# Patient Record
Sex: MALE | Race: WHITE | NOT HISPANIC OR LATINO | Employment: FULL TIME | URBAN - METROPOLITAN AREA
[De-identification: names, ages, dates, MRNs, and addresses within clinical notes are randomized per-mention and may not be internally consistent; named-entity substitution may affect disease eponyms.]

---

## 2017-01-03 ENCOUNTER — PATIENT OUTREACH (OUTPATIENT)
Dept: ADMINISTRATIVE | Facility: CLINIC | Age: 52
End: 2017-01-03
Payer: COMMERCIAL

## 2017-01-03 NOTE — PATIENT INSTRUCTIONS
Discharge Instructions for Diverticulitis  You have been diagnosed with diverticulitis. This is a condition in which small pouches form in your colon (large intestine) and become inflamed or infected. Follow the guidelines below for home care.  As you recover  Tips for recovery include:  · Eat a low-fiber diet. Your healthcare provider may advise a liquid diet. This gives your bowel a chance to rest so that it can recover.  · Foods to include: flake cereal, mashed potatoes, pancakes, waffles, pasta, white bread, rice, applesauce, bananas, eggs, fish, poultry, tofu, and well-cooked vegetables  · Take your medicines as directed. Do not stop taking the medicines, even if you feel better.  · Monitor your temperature and report any rising temperature to your healthcare provider.  · Take antibiotics exactly as directed. Do not miss any and keep taking them even if you feel better.   · Drink 6 to 8 glasses of water every day, unless directed otherwise.  · Use a heating pad or hot water bottle to reduce abdominal cramping or pain.  Preventing diverticulitis in the future  Tips for prevention include:  · Eat a high-fiber diet. Fiber adds bulk to the stool so that it passes through the large intestine more easily.  · Keep drinking 6 to 8 glasses of water every day, unless directed otherwise.  · Begin an exercise program. Ask your healthcare provider how to get started. You can benefit from simple activities such as walking or gardening.  · Treat diarrhea with a bland diet. Start with liquids only, then slowly add fiber over time.  · Watch for changes in your bowel movements (constipation to diarrhea).  · Avoid constipation with fiber and add a stool softener if needed.   · Get plenty of rest and sleep.  Follow-up care  Make a follow-up appointment as directed by our staff.  When to call your healthcare provider  Call your healthcare provider immediately if you have any of the following:  · Fever of 100.4°F (38.0°C) or  higher, or as directed by your healthcare provider  · Chills  · Severe cramps in the belly, most commonly the lower left side  · Tenderness in the belly, most commonly the lower left side  · Nausea and vomiting  · Bleeding from your rectum   © 2888-1193 [employer]. [address] [place] [address]. All rights reserved. This information is not intended as a substitute for professional medical care. Always follow your healthcare professional's instructions.

## 2017-03-20 ENCOUNTER — HOSPITAL ENCOUNTER (EMERGENCY)
Facility: HOSPITAL | Age: 52
Discharge: HOME | End: 2017-03-20
Attending: EMERGENCY MEDICINE
Payer: COMMERCIAL

## 2017-03-20 VITALS
SYSTOLIC BLOOD PRESSURE: 154 MMHG | DIASTOLIC BLOOD PRESSURE: 89 MMHG | TEMPERATURE: 99 F | WEIGHT: 218 LBS | OXYGEN SATURATION: 97 % | RESPIRATION RATE: 18 BRPM | BODY MASS INDEX: 27.98 KG/M2 | HEIGHT: 74 IN | HEART RATE: 100 BPM

## 2017-03-20 DIAGNOSIS — R10.9 ABDOMINAL PAIN, UNSPECIFIED LOCATION: ICD-10-CM

## 2017-03-20 DIAGNOSIS — K57.32 DIVERTICULITIS OF LARGE INTESTINE WITHOUT PERFORATION OR ABSCESS WITHOUT BLEEDING: Primary | ICD-10-CM

## 2017-03-20 LAB
ALBUMIN SERPL BCP-MCNC: 4.4 G/DL
ALP SERPL-CCNC: 57 U/L
ALT SERPL W/O P-5'-P-CCNC: 38 U/L
AMYLASE SERPL-CCNC: 53 U/L
ANION GAP SERPL CALC-SCNC: 11 MMOL/L
AST SERPL-CCNC: 16 U/L
BASOPHILS # BLD AUTO: 0.03 K/UL
BASOPHILS NFR BLD: 0.2 %
BILIRUB SERPL-MCNC: 1.8 MG/DL
BILIRUB UR QL STRIP: NEGATIVE
BUN SERPL-MCNC: 17 MG/DL
CALCIUM SERPL-MCNC: 10.1 MG/DL
CHLORIDE SERPL-SCNC: 101 MMOL/L
CLARITY UR: CLEAR
CO2 SERPL-SCNC: 26 MMOL/L
COLOR UR: YELLOW
CREAT SERPL-MCNC: 1.1 MG/DL
DIFFERENTIAL METHOD: ABNORMAL
EOSINOPHIL # BLD AUTO: 0.1 K/UL
EOSINOPHIL NFR BLD: 0.5 %
ERYTHROCYTE [DISTWIDTH] IN BLOOD BY AUTOMATED COUNT: 13 %
EST. GFR  (AFRICAN AMERICAN): >60 ML/MIN/1.73 M^2
EST. GFR  (NON AFRICAN AMERICAN): >60 ML/MIN/1.73 M^2
GLUCOSE SERPL-MCNC: 101 MG/DL
GLUCOSE UR QL STRIP: NEGATIVE
HCT VFR BLD AUTO: 45.3 %
HGB BLD-MCNC: 16.4 G/DL
HGB UR QL STRIP: NEGATIVE
KETONES UR QL STRIP: ABNORMAL
LEUKOCYTE ESTERASE UR QL STRIP: NEGATIVE
LIPASE SERPL-CCNC: 17 U/L
LYMPHOCYTES # BLD AUTO: 1.7 K/UL
LYMPHOCYTES NFR BLD: 9.5 %
MCH RBC QN AUTO: 31.4 PG
MCHC RBC AUTO-ENTMCNC: 36.2 %
MCV RBC AUTO: 87 FL
MONOCYTES # BLD AUTO: 1.8 K/UL
MONOCYTES NFR BLD: 10.1 %
NEUTROPHILS # BLD AUTO: 13.8 K/UL
NEUTROPHILS NFR BLD: 79.7 %
NITRITE UR QL STRIP: NEGATIVE
PH UR STRIP: 6 [PH] (ref 5–8)
PLATELET # BLD AUTO: 237 K/UL
PMV BLD AUTO: 11.1 FL
POTASSIUM SERPL-SCNC: 4.2 MMOL/L
PROT SERPL-MCNC: 8.7 G/DL
PROT UR QL STRIP: ABNORMAL
RBC # BLD AUTO: 5.22 M/UL
SODIUM SERPL-SCNC: 138 MMOL/L
SP GR UR STRIP: 1.02 (ref 1–1.03)
URN SPEC COLLECT METH UR: ABNORMAL
UROBILINOGEN UR STRIP-ACNC: NEGATIVE EU/DL
WBC # BLD AUTO: 17.33 K/UL

## 2017-03-20 PROCEDURE — 81003 URINALYSIS AUTO W/O SCOPE: CPT

## 2017-03-20 PROCEDURE — 36000 PLACE NEEDLE IN VEIN: CPT

## 2017-03-20 PROCEDURE — 85025 COMPLETE CBC W/AUTO DIFF WBC: CPT

## 2017-03-20 PROCEDURE — 80053 COMPREHEN METABOLIC PANEL: CPT

## 2017-03-20 PROCEDURE — 99284 EMERGENCY DEPT VISIT MOD MDM: CPT | Mod: 25

## 2017-03-20 PROCEDURE — 82150 ASSAY OF AMYLASE: CPT

## 2017-03-20 PROCEDURE — 83690 ASSAY OF LIPASE: CPT

## 2017-03-20 RX ORDER — PROMETHAZINE HYDROCHLORIDE 25 MG/1
25 TABLET ORAL EVERY 6 HOURS PRN
Qty: 15 TABLET | Refills: 0 | Status: SHIPPED | OUTPATIENT
Start: 2017-03-20 | End: 2017-12-07

## 2017-03-20 RX ORDER — CIPROFLOXACIN 500 MG/1
500 TABLET ORAL 2 TIMES DAILY
Qty: 20 TABLET | Refills: 0 | Status: SHIPPED | OUTPATIENT
Start: 2017-03-20 | End: 2017-03-28

## 2017-03-20 RX ORDER — METRONIDAZOLE 500 MG/1
500 TABLET ORAL 3 TIMES DAILY
Qty: 21 TABLET | Refills: 0 | Status: SHIPPED | OUTPATIENT
Start: 2017-03-20 | End: 2017-03-27

## 2017-03-20 RX ORDER — TRAMADOL HYDROCHLORIDE 50 MG/1
50 TABLET ORAL
COMMUNITY
Start: 2017-02-20 | End: 2017-04-12 | Stop reason: SDUPTHER

## 2017-03-20 RX ORDER — HYDROCODONE BITARTRATE AND ACETAMINOPHEN 5; 325 MG/1; MG/1
1 TABLET ORAL EVERY 4 HOURS PRN
Qty: 11 TABLET | Refills: 0 | Status: SHIPPED | OUTPATIENT
Start: 2017-03-20 | End: 2017-03-28

## 2017-03-20 RX ORDER — TIZANIDINE 4 MG/1
TABLET ORAL
COMMUNITY
Start: 2016-10-06 | End: 2017-04-12 | Stop reason: SDUPTHER

## 2017-03-20 NOTE — ED AVS SNAPSHOT
[facility] -   [id number] [facility] [id number]               Dimas Darden   3/20/2017  5:02 PM   ED    Description:  Male : 1965   Department:  [facility]           Your Care was Coordinated By:     Provider Role From To    Tim Sullivan MD Attending Provider [phone] --      Reason for Visit     Abdominal Pain           Diagnoses this Visit        Comments    Diverticulitis of large intestine without perforation or abscess without bleeding    -  Primary     Abdominal pain, unspecified location           ED Disposition     ED Disposition Condition Comment    Discharge             To Do List           Follow-up Information     Follow up with PCP In 2 days.    Contact information:    [phone]       These Medications        Disp Refills Start End    hydrocodone-acetaminophen 5-325mg (NORCO) 5-325 mg per tablet 11 tablet 0 3/20/2017 3/30/2017    Take 1 tablet by mouth every 4 (four) hours as needed for Pain. - Oral    Pharmacy: [address] Ph #: [phone]       promethazine (PHENERGAN) 25 MG tablet 15 tablet 0 3/20/2017     Take 1 tablet (25 mg total) by mouth every 6 (six) hours as needed for Nausea. - Oral    Pharmacy: [address] Ph #: [phone]       ciprofloxacin HCl (CIPRO) 500 MG tablet 20 tablet 0 3/20/2017 3/30/2017    Take 1 tablet (500 mg total) by mouth 2 (two) times daily. - Oral    Pharmacy: [address] Ph #: [phone]       metronidazole (FLAGYL) 500 MG tablet 21 tablet 0 3/20/2017 3/27/2017    Take 1 tablet (500 mg total) by mouth 3 (three) times daily. - Oral    Pharmacy: [address] Ph #: [phone]         [facility] On Call     [facility] -  Assistance  Registered nurses in  the [facility] provide clinical advisement, health education, appointment booking, and other advisory services.  Call for this free service at [phone].             Medications           Message regarding Medications     Verify the changes and/or additions to your medication regime listed below are the same as discussed with your clinician today.  If any of these changes or additions are incorrect, please notify your healthcare provider.        START taking these NEW medications        Refills    hydrocodone-acetaminophen 5-325mg (NORCO) 5-325 mg per tablet 0    Sig: Take 1 tablet by mouth every 4 (four) hours as needed for Pain.    Class: Print    Route: Oral    promethazine (PHENERGAN) 25 MG tablet 0    Sig: Take 1 tablet (25 mg total) by mouth every 6 (six) hours as needed for Nausea.    Class: Print    Route: Oral    ciprofloxacin HCl (CIPRO) 500 MG tablet 0    Sig: Take 1 tablet (500 mg total) by mouth 2 (two) times daily.    Class: Print    Route: Oral    metronidazole (FLAGYL) 500 MG tablet 0    Sig: Take 1 tablet (500 mg total) by mouth 3 (three) times daily.    Class: Print    Route: Oral           Verify that the below list of medications is an accurate representation of the medications you are currently taking.  If none reported, the list may be blank. If incorrect, please contact your healthcare provider. Carry this list with you in case of emergency.           Current Medications     tizanidine (ZANAFLEX) 4 MG tablet TAKE ONE TABLET BY MOUTH TWICE DAILY AS NEEDED FOR MUSCLE SPASM    tramadol (ULTRAM) 50 mg tablet Take 50 mg by mouth.    ciprofloxacin HCl (CIPRO) 500 MG tablet Take 1 tablet (500 mg total) by mouth 2 (two) times daily.    hydrocodone-acetaminophen 5-325mg (NORCO) 5-325 mg per tablet Take 1 tablet by mouth every 4 (four) hours as needed for Pain.    metronidazole (FLAGYL) 500 MG tablet Take 1 tablet (500 mg total) by mouth 3 (three) times daily.    promethazine  "(PHENERGAN) 25 MG tablet Take 1 tablet (25 mg total) by mouth every 6 (six) hours as needed for Nausea.           Clinical Reference Information           Your Vitals Were     BP Pulse Temp Resp Height Weight    176/88 (BP Location: Right arm, Patient Position: Sitting) 116 98.4 °F (36.9 °C) (Oral) 18 6' 2" (1.88 m) 98.9 kg (218 lb)    SpO2 BMI             95% 27.99 kg/m2         Allergies as of 3/20/2017     No Known Allergies      Immunizations Administered on Date of Encounter - 3/20/2017     None      ED Micro, Lab, POCT     Start Ordered       Status Ordering Provider    03/20/17 1711 03/20/17 1710  CBC auto differential  STAT      Final result     03/20/17 1711 03/20/17 1710  Comprehensive metabolic panel  STAT      In process     03/20/17 1711 03/20/17 1710  Urinalysis  STAT      Final result     03/20/17 1711 03/20/17 1710  Lipase  STAT      In process     03/20/17 1711 03/20/17 1710  Amylase  STAT      In process       ED Imaging Orders     Start Ordered       Status Ordering Provider    03/20/17 1711 03/20/17 1710  CT Renal Stone Study ABD Pelvis WO  1 time imaging      Final result         Discharge Instructions         Abdominal Pain    Abdominal pain is pain in the stomach or belly area. Everyone has this pain from time to time. In many cases it goes away on its own. But abdominal pain can sometimes be due to a serious problem, such as appendicitis. So its important to know when to seek help.  Causes of abdominal pain  There are many possible causes of abdominal pain. Common causes in adults include:  · Constipation, diarrhea, or gas  · Stomach acid flowing back up into the esophagus (acid reflux or heartburn)  · Severe acid reflux, called GERD (gastroesophageal reflux disease)  · A sore in the lining of the stomach or small intestine (peptic ulcer)  · Inflammation of the gallbladder, liver, or pancreas  · Gallstones or kidney stones  · Appendicitis   · Intestinal blockage   · An internal organ pushing " through a muscle or other tissue (hernia)  · Urinary tract infections  · In women, menstrual cramps, fibroids, or endometriosis  · Inflammation or infection of the intestines  Diagnosing the cause of abdominal pain  Your healthcare provider will do a physical exam help find the cause of your pain. If needed, tests will be ordered. Belly pain has many possible causes. So it can be hard to find the reason for your pain. Giving details about your pain can help. Tell your provider where and when you feel the pain, and what makes it better or worse. Also let your provider know if you have other symptoms such as:  · Fever  · Tiredness  · Upset stomach (nausea)  · Vomiting  · Changes in bathroom habits  Treating abdominal pain  Some causes of pain need emergency medical treatment right away. These include appendicitis or a bowel blockage. Other problems can be treated with rest, fluids, or medicines. Your healthcare provider can give you specific instructions for treatment or self-care based on what is causing your pain.  If you have vomiting or diarrhea, sip water or other clear fluids. When you are ready to eat solid foods again, start with small amounts of easy-to-digest, low-fat foods. These include apple sauce, toast, or crackers.   When to seek medical care  Call 911 or go to the hospital right away if you:  · Cant pass stool and are vomiting  · Are vomiting blood or have bloody diarrhea or black, tarry diarrhea  · Have chest, neck, or shoulder pain  · Feel like you might pass out  · Have pain in your shoulder blades with nausea  · Have sudden, severe belly pain  · Have new, severe pain unlike any you have felt before  · Have a belly that is rigid, hard, and tender to touch  Call your healthcare provider if you have:  · Pain for more than 5 days  · Bloating for more than 2 days  · Diarrhea for more than 5 days  · A fever of 100.4°F (38.0°C) or higher, or as directed by your provider  · Pain that gets worse  · Weight  loss for no reason  · Continued lack of appetite  · Blood in your stool  How to prevent abdominal pain  Here are some tips to help prevent abdominal pain:  · Eat smaller amounts of food at one time.  · Avoid greasy, fried, or other high-fat foods.  · Avoid foods that give you gas.  · Exercise regularly.  · Drink plenty of fluids.  To help prevent GERD symptoms:  · Quit smoking.  · Reduce alcohol and certain foods that increase stomach acid.  · Avoid aspirin and over-the-counter pain and fever medicines (NSAIDS or nonsteroidal anti-inflammatory drugs), if possible  · Lose extra weight.  · Finish eating at least 2 hours before you go to bed or lie down.  · Raise the head of your bed.  Date Last Reviewed: 7/1/2016  © 6993-3036 [employer]. [address] [place] [address], [place] [address]. All rights reserved. This information is not intended as a substitute for professional medical care. Always follow your healthcare professional's instructions.          [facility] Sign-Up     Activating your [facility] account is as easy as 1-2-3!     1) Visit [email or web address], select Sign Up Now, enter this activation code and your date of birth, then select Next.  [email or web address]  Expires: 5/4/2017  6:01 PM      2) Create a username and password to use when you visit [facility] in the future and select a security question in case you lose your password and select Next.    3) Enter your e-mail address and click Sign Up!    Additional Information  If you have questions, please e-mail [email or web address] or call [phone] to talk to our [facility] staff. Remember, [facility] is NOT to be used for urgent needs. For medical emergencies, dial 911.          [facility] complies with applicable Federal civil rights laws and does not discriminate on the basis of race, color, national origin, age, disability, or sex.        Language Assistance Services     ATTENTION: Language assistance services are available, free of  charge. Please call [phone].      ATENCIÓN: Si habla español, tiene a burciaga disposición servicios gratuitos de asistencia lingüística. Llame al [phone].     CHÚ Ý: N?u b?n nói Ti?ng Vi?t, có các d?ch v? h? tr? ngôn ng? mi?n phí dành cho b?n. G?i s? [phone].

## 2017-03-20 NOTE — ED PROVIDER NOTES
SCRIBE #1 NOTE: I, Christa Lucas, am scribing for, and in the presence of, Tim Sullivan MD. I have scribed the entire note.      History      Chief Complaint   Patient presents with    Abdominal Pain     pt states he was told he had appendicitis       Review of patient's allergies indicates:  No Known Allergies     HPI   HPI    3/20/2017, 5:07 PM   History obtained from the patient      History of Present Illness: Dimas Darden is a 51 y.o. male patient who presents to the Emergency Department for abdominal pain which onset gradually 3 days ago. Pain is located to the RUQ and is described as stabbing. Pt was seen by PCP PTA and was told to come to ED for further evaluation. Sx have been constant and moderate in severity. Pt states that pain began gradually worsening last night after eating. No mitigating factors noted. No associated sx included. Pt denies any fever, chills, CP, SOB, n/v/d, constipation, dysuria, or hematuria. No further complaints at this time.       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 Not pertinent     Social History:  Social History     Social History Main Topics    Smoking status: Never Smoker    Smokeless tobacco: Unknown     Alcohol use Yes    Drug use: Unknown     Sexual activity: Unknown        ROS   Review of Systems   Constitutional: Negative for chills, diaphoresis and fever.   HENT: Negative for sore throat.    Respiratory: Negative for shortness of breath.    Cardiovascular: Negative for chest pain.   Gastrointestinal: Positive for abdominal pain. Negative for blood in stool, constipation, diarrhea, nausea and vomiting.   Genitourinary: Negative for dysuria.   Musculoskeletal:  "Negative for back pain, neck pain and neck stiffness.   Skin: Negative for rash.   Neurological: Negative for dizziness, weakness, light-headedness, numbness and headaches.   Hematological: Does not bruise/bleed easily.       Physical Exam    Initial Vitals   BP Pulse Resp Temp SpO2   03/20/17 1629 03/20/17 1629 03/20/17 1629 03/20/17 1629 03/20/17 1629   176/88 116 18 98.4 °F (36.9 °C) 95 %      Physical Exam  Nursing Notes and Vital Signs Reviewed.  Constitutional: Patient is in no acute distress. Awake and alert. Well-developed and well-nourished.  Head: Atraumatic. Normocephalic.  Eyes: PERRL. EOM intact. Conjunctivae are not pale. No scleral icterus.  ENT: Mucous membranes are moist. Oropharynx is clear and symmetric.    Neck: Supple. Full ROM. No lymphadenopathy.  Cardiovascular: Regular rate. Regular rhythm. No murmurs, rubs, or gallops. Distal pulses are 2+ and symmetric.  Pulmonary/Chest: No respiratory distress. Clear to auscultation bilaterally. No wheezing, rales, or rhonchi.  Abdominal: Soft and non-distended.  There is no tenderness.  No rebound, guarding, or rigidity.   Musculoskeletal: Moves all extremities. No obvious deformities. No edema. No calf tenderness.  Skin: Warm and dry.  Neurological:  Alert, awake, and appropriate.  Normal speech.  No acute focal neurological deficits are appreciated.  Psychiatric: Normal affect. Good eye contact. Appropriate in content.    ED Course    Procedures  ED Vital Signs:  Vitals:    03/20/17 1629   BP: (!) 176/88   Pulse: (!) 116   Resp: 18   Temp: 98.4 °F (36.9 °C)   TempSrc: Oral   SpO2: 95%   Weight: 98.9 kg (218 lb)   Height: 6' 2" (1.88 m)       Abnormal Lab Results:  Labs Reviewed   CBC W/ AUTO DIFFERENTIAL - Abnormal; Notable for the following:        Result Value    WBC 17.33 (*)     MCH 31.4 (*)     MCHC 36.2 (*)     Gran # 13.8 (*)     Mono # 1.8 (*)     Gran% 79.7 (*)     Lymph% 9.5 (*)     All other components within normal limits   COMPREHENSIVE " METABOLIC PANEL - Abnormal; Notable for the following:     Total Protein 8.7 (*)     Total Bilirubin 1.8 (*)     All other components within normal limits   URINALYSIS - Abnormal; Notable for the following:     Protein, UA Trace (*)     Ketones, UA 2+ (*)     All other components within normal limits   LIPASE   AMYLASE        All Lab Results:  Results for orders placed or performed during the hospital encounter of 03/20/17   CBC auto differential   Result Value Ref Range    WBC 17.33 (H) 3.90 - 12.70 K/uL    RBC 5.22 4.60 - 6.20 M/uL    Hemoglobin 16.4 14.0 - 18.0 g/dL    Hematocrit 45.3 40.0 - 54.0 %    MCV 87 82 - 98 fL    MCH 31.4 (H) 27.0 - 31.0 pg    MCHC 36.2 (H) 32.0 - 36.0 %    RDW 13.0 11.5 - 14.5 %    Platelets 237 150 - 350 K/uL    MPV 11.1 9.2 - 12.9 fL    Gran # 13.8 (H) 1.8 - 7.7 K/uL    Lymph # 1.7 1.0 - 4.8 K/uL    Mono # 1.8 (H) 0.3 - 1.0 K/uL    Eos # 0.1 0.0 - 0.5 K/uL    Baso # 0.03 0.00 - 0.20 K/uL    Gran% 79.7 (H) 38.0 - 73.0 %    Lymph% 9.5 (L) 18.0 - 48.0 %    Mono% 10.1 4.0 - 15.0 %    Eosinophil% 0.5 0.0 - 8.0 %    Basophil% 0.2 0.0 - 1.9 %    Differential Method Automated    Comprehensive metabolic panel   Result Value Ref Range    Sodium 138 136 - 145 mmol/L    Potassium 4.2 3.5 - 5.1 mmol/L    Chloride 101 95 - 110 mmol/L    CO2 26 23 - 29 mmol/L    Glucose 101 70 - 110 mg/dL    BUN, Bld 17 6 - 20 mg/dL    Creatinine 1.1 0.5 - 1.4 mg/dL    Calcium 10.1 8.7 - 10.5 mg/dL    Total Protein 8.7 (H) 6.0 - 8.4 g/dL    Albumin 4.4 3.5 - 5.2 g/dL    Total Bilirubin 1.8 (H) 0.1 - 1.0 mg/dL    Alkaline Phosphatase 57 55 - 135 U/L    AST 16 10 - 40 U/L    ALT 38 10 - 44 U/L    Anion Gap 11 8 - 16 mmol/L    eGFR if African American >60 >60 mL/min/1.73 m^2    eGFR if non African American >60 >60 mL/min/1.73 m^2   Urinalysis   Result Value Ref Range    Specimen UA Urine, Clean Catch     Color, UA Yellow Yellow, Straw, Barbara    Appearance, UA Clear Clear    pH, UA 6.0 5.0 - 8.0    Specific Gravity, UA  1.020 1.005 - 1.030    Protein, UA Trace (A) Negative    Glucose, UA Negative Negative    Ketones, UA 2+ (A) Negative    Bilirubin (UA) Negative Negative    Occult Blood UA Negative Negative    Nitrite, UA Negative Negative    Urobilinogen, UA Negative <2.0 EU/dL    Leukocytes, UA Negative Negative   Lipase   Result Value Ref Range    Lipase 17 4 - 60 U/L   Amylase   Result Value Ref Range    Amylase 53 20 - 110 U/L         Imaging Results:  Imaging Results         CT Renal Stone Study ABD Pelvis WO (Final result) Result time:  03/20/17 17:55:46    Final result by Pacheco Carrero MD (03/20/17 17:55:46)    Impression:           1.  Findings consistent with uncomplicated diverticulitis of the ascending colon in this patient with diffuse diverticulosis.  Significant wall thickening of the adjacent colon.  Neoplastic process is not completely excluded.  According to ensure resolution recommended.  Negative for free air.  Negative for focal drainable fluid collection.  2.  Bibasilar atelectasis.  3.  Negative for acute process otherwise.  Normal appendix negative for renal stone disease or hydronephrosis.    4.  Numerous hepatic cysts.  Multilevel degenerative disc changes of the lumbar.  Prostate calcifications.  Other incidental findings as noted above.    All CT scans at this facility used dose modulation, iterative reconstruction, and/or weight based dosing when appropriate to reduce radiation dose to as low as reasonably achievable.      Electronically signed by: PACHECO CARRERO MD  Date:     03/20/17  Time:    17:55     Narrative:    CT abdomen and pelvis without contrast, <multiplanar reconstructions>    CLINICAL INDICATION: Right upper quadrant abdominal pain.    flank pain.    TECHNIQUE  Axial images through the abdomen and pelvis were obtained without IV or oral contrast. <Sagittal and coronal reconstructions are provided for review.>    FINDINGS: No comparison studies are available.    LUNG BASES: Scarring or  atelectasis in the bilateral lower lobes and inferior middle lobe and lingula. The lung bases are otherwise clear.  Negative for pleural or pericardial abnormalities.  The distal esophagus is normal. Trace amount of pericardial fluid.    ABDOMEN: Multiple subcentimeter hepatic low attenuation foci noted, likely cysts. Noncontrasted appearances of the liver, pancreas, and spleen are otherwise normal. The gallbladder and biliary tree are normal.      The kidneys are without solid or cystic masses, hydronephrosis or renal stone disease.    Minor calcifications are present without aneurysmal change.  Retroaortic left renal vein incidentally noted.  Negative for ascites.      Moderate inflammatory changes surround the ascending colon just underneath the right hepatic lobe associated with numerous right-sided colonic diverticula, without evidence for free air or focal drainable fluid collection.  Normal appendix.  The rest of the colon is normal, considering there is diffuse diverticulosis.  Small bowel is normal.  The bowel is      PELVIS:  The urinary bladder is normal.  Prostate calcifications.  The male pelvic organs are otherwise normal.  There are pelvic phleboliths.    Negative for osseous abnormalities.  Negative for significant spinal canal stenosis. Multilevel marginal spondylosis.  Mild disc height reduction at L3/L4.  Moderate disc height reduction at L5/S1 with vacuum phenomenon.  Multilevel degenerative facet arthropathy.    Negative for groin adenopathy.         Abdominal wall is intact.                      The Emergency Provider reviewed the vital signs and test results, which are outlined above.    ED Discussion     5:58 PM: Reassessed pt at this time.  Discussed with pt all pertinent ED information and results. Discussed pt dx  and plan of tx. Gave pt all f/u and return to the ED instructions. All questions and concerns were addressed at this time. Pt expresses understanding of information and  instructions, and is comfortable with plan to discharge. Pt is stable for discharge.    New Prescriptions    CIPROFLOXACIN HCL (CIPRO) 500 MG TABLET    Take 1 tablet (500 mg total) by mouth 2 (two) times daily.    HYDROCODONE-ACETAMINOPHEN 5-325MG (NORCO) 5-325 MG PER TABLET    Take 1 tablet by mouth every 4 (four) hours as needed for Pain.    METRONIDAZOLE (FLAGYL) 500 MG TABLET    Take 1 tablet (500 mg total) by mouth 3 (three) times daily.    PROMETHAZINE (PHENERGAN) 25 MG TABLET    Take 1 tablet (25 mg total) by mouth every 6 (six) hours as needed for Nausea.       Follow-up Information     Follow up with PCP In 2 days.    Contact information:    [phone]            Medical Decision Making    Medical Decision Making:   Clinical Tests:   Lab Tests: Ordered and Reviewed  Radiological Study: Ordered and Reviewed           Scribe Attestation:   Scribe #1: I performed the above scribed service and the documentation accurately describes the services I performed. I attest to the accuracy of the note.    Attending:   Physician Attestation Statement for Scribe #1: I, Tim Sullivan MD, personally performed the services described in this documentation, as scribed by Christa Lucas, in my presence, and it is both accurate and complete.          Clinical Impression       ICD-10-CM ICD-9-CM   1. Diverticulitis of large intestine without perforation or abscess without bleeding K57.32 562.11   2. Abdominal pain, unspecified location R10.9 789.00       Disposition:   Disposition: Discharged  Condition: Stable         Tim Sullivan MD  03/20/17 7945

## 2017-03-20 NOTE — DISCHARGE INSTRUCTIONS
Abdominal Pain    Abdominal pain is pain in the stomach or belly area. Everyone has this pain from time to time. In many cases it goes away on its own. But abdominal pain can sometimes be due to a serious problem, such as appendicitis. So its important to know when to seek help.  Causes of abdominal pain  There are many possible causes of abdominal pain. Common causes in adults include:  · Constipation, diarrhea, or gas  · Stomach acid flowing back up into the esophagus (acid reflux or heartburn)  · Severe acid reflux, called GERD (gastroesophageal reflux disease)  · A sore in the lining of the stomach or small intestine (peptic ulcer)  · Inflammation of the gallbladder, liver, or pancreas  · Gallstones or kidney stones  · Appendicitis   · Intestinal blockage   · An internal organ pushing through a muscle or other tissue (hernia)  · Urinary tract infections  · In women, menstrual cramps, fibroids, or endometriosis  · Inflammation or infection of the intestines  Diagnosing the cause of abdominal pain  Your healthcare provider will do a physical exam help find the cause of your pain. If needed, tests will be ordered. Belly pain has many possible causes. So it can be hard to find the reason for your pain. Giving details about your pain can help. Tell your provider where and when you feel the pain, and what makes it better or worse. Also let your provider know if you have other symptoms such as:  · Fever  · Tiredness  · Upset stomach (nausea)  · Vomiting  · Changes in bathroom habits  Treating abdominal pain  Some causes of pain need emergency medical treatment right away. These include appendicitis or a bowel blockage. Other problems can be treated with rest, fluids, or medicines. Your healthcare provider can give you specific instructions for treatment or self-care based on what is causing your pain.  If you have vomiting or diarrhea, sip water or other clear fluids. When you are ready to eat solid foods again,  start with small amounts of easy-to-digest, low-fat foods. These include apple sauce, toast, or crackers.   When to seek medical care  Call 911 or go to the hospital right away if you:  · Cant pass stool and are vomiting  · Are vomiting blood or have bloody diarrhea or black, tarry diarrhea  · Have chest, neck, or shoulder pain  · Feel like you might pass out  · Have pain in your shoulder blades with nausea  · Have sudden, severe belly pain  · Have new, severe pain unlike any you have felt before  · Have a belly that is rigid, hard, and tender to touch  Call your healthcare provider if you have:  · Pain for more than 5 days  · Bloating for more than 2 days  · Diarrhea for more than 5 days  · A fever of 100.4°F (38.0°C) or higher, or as directed by your provider  · Pain that gets worse  · Weight loss for no reason  · Continued lack of appetite  · Blood in your stool  How to prevent abdominal pain  Here are some tips to help prevent abdominal pain:  · Eat smaller amounts of food at one time.  · Avoid greasy, fried, or other high-fat foods.  · Avoid foods that give you gas.  · Exercise regularly.  · Drink plenty of fluids.  To help prevent GERD symptoms:  · Quit smoking.  · Reduce alcohol and certain foods that increase stomach acid.  · Avoid aspirin and over-the-counter pain and fever medicines (NSAIDS or nonsteroidal anti-inflammatory drugs), if possible  · Lose extra weight.  · Finish eating at least 2 hours before you go to bed or lie down.  · Raise the head of your bed.  Date Last Reviewed: 7/1/2016  © 4502-4095 [employer]. [address] [place] [address], [place] [address]. All rights reserved. This information is not intended as a substitute for professional medical care. Always follow your healthcare professional's instructions.

## 2017-03-28 ENCOUNTER — OFFICE VISIT (OUTPATIENT)
Dept: INTERNAL MEDICINE | Facility: CLINIC | Age: 52
End: 2017-03-28
Payer: COMMERCIAL

## 2017-03-28 VITALS
TEMPERATURE: 96 F | HEIGHT: 74 IN | DIASTOLIC BLOOD PRESSURE: 80 MMHG | HEART RATE: 76 BPM | WEIGHT: 215.81 LBS | BODY MASS INDEX: 27.7 KG/M2 | SYSTOLIC BLOOD PRESSURE: 122 MMHG | OXYGEN SATURATION: 96 %

## 2017-03-28 DIAGNOSIS — Z12.11 COLON CANCER SCREENING: Primary | ICD-10-CM

## 2017-03-28 DIAGNOSIS — Z76.89 ENCOUNTER TO ESTABLISH CARE WITH NEW DOCTOR: ICD-10-CM

## 2017-03-28 DIAGNOSIS — Z13.9 SCREENING: ICD-10-CM

## 2017-03-28 PROCEDURE — 1160F RVW MEDS BY RX/DR IN RCRD: CPT | Mod: S$GLB,,, | Performed by: FAMILY MEDICINE

## 2017-03-28 PROCEDURE — 99999 PR PBB SHADOW E&M-EST. PATIENT-LVL III: CPT | Mod: PBBFAC,,, | Performed by: FAMILY MEDICINE

## 2017-03-28 PROCEDURE — 99204 OFFICE O/P NEW MOD 45 MIN: CPT | Mod: S$GLB,,, | Performed by: FAMILY MEDICINE

## 2017-03-28 NOTE — PROGRESS NOTES
Subjective:       Patient ID: Dimas Darden is a 51 y.o. male.    Chief Complaint: Follow-up    HPI Hospital follow up with diverticulitis and to establish care. He has a past medical history as listed below.  Hospital ED HPI below in blue.    History of Present Illness: Dimas Darden is a 51 y.o. male patient who presents to the Emergency Department for abdominal pain which onset gradually 3 days ago. Pain is located to the RUQ and is described as stabbing. Pt was seen by PCP PTA and was told to come to ED for further evaluation. Sx have been constant and moderate in severity. Pt states that pain began gradually worsening last night after eating. No mitigating factors noted. No associated sx included. Pt denies any fever, chills, CP, SOB, n/v/d, constipation, dysuria, or hematuria. No further complaints at this time.     History of back pain if he does too much. Muscle relaxer helps more than pain pills.     Review of Systems   Constitutional: Negative for activity change, appetite change, fatigue and fever.   HENT: Negative for congestion, ear pain, facial swelling, hearing loss, sore throat and tinnitus.    Eyes: Negative for redness and visual disturbance.   Respiratory: Negative for cough, chest tightness and wheezing.    Gastrointestinal: Negative for abdominal distention, abdominal pain, constipation, diarrhea, nausea and vomiting.   Endocrine: Negative for polydipsia and polyuria.   Genitourinary: Negative for discharge, flank pain and frequency.   Musculoskeletal: Negative for back pain, gait problem and joint swelling.   Skin: Negative for rash.   Neurological: Negative for dizziness, tremors, seizures, weakness and headaches.   Psychiatric/Behavioral: Negative for agitation and confusion.           Past Medical History:   Diagnosis Date    Disc     Bulging disc in back    Diverticulitis      Past Surgical History:   Procedure Laterality Date    UNDESCENDED TESTICLE EXPLORATION Left     Age 15      History reviewed. No pertinent family history.  Social History     Social History    Marital status:      Spouse name: N/A    Number of children: N/A    Years of education: N/A     Social History Main Topics    Smoking status: Never Smoker    Smokeless tobacco: None    Alcohol use Yes      Comment: socially    Drug use: No    Sexual activity: Yes     Partners: Female     Other Topics Concern    None     Social History Narrative    None       Objective:        Physical Exam   Constitutional: He is oriented to person, place, and time. He appears well-nourished. He does not appear ill.   HENT:   Head: Normocephalic and atraumatic.   Right Ear: External ear normal.   Left Ear: External ear normal.   Eyes: EOM are normal. Pupils are equal, round, and reactive to light. Right eye exhibits no discharge. Left eye exhibits no discharge. No scleral icterus.   Neck: Normal range of motion. Neck supple. No thyromegaly present.   Cardiovascular: Normal rate, regular rhythm and normal heart sounds.    No murmur heard.  Pulmonary/Chest: Effort normal and breath sounds normal. No respiratory distress. He has no wheezes.   Abdominal: Soft. Bowel sounds are normal. He exhibits no distension. There is no tenderness.   Musculoskeletal: Normal range of motion. He exhibits no edema.   Neurological: He is alert and oriented to person, place, and time. He has normal reflexes. Coordination normal.   Skin: Skin is warm. No rash noted. No erythema.   Psychiatric: He has a normal mood and affect. His behavior is normal.   Nursing note and vitals reviewed.        Assessment/Plan:   Colon cancer screening  -     Discussed colonoscopy today he does not want to do it this year. Will obtain fecal occult.   -     Occult blood x 1, stool; Future; Expected date: 3/28/17  -     Occult blood x 1, stool; Future; Expected date: 3/28/17  -     Occult blood x 1, stool; Future; Expected date: 3/28/17    Encounter to establish care with  new doctor  -     Lipid panel; Future; Expected date: 3/28/17  Discussed past medical, surgical, social and family history. Reviewed health maintenance.     Screening  -     Lipid panel; Future; Expected date: 3/28/17      Return in about 1 year (around 3/28/2018).    Angela Payne MD  [facility]   Family Medicine

## 2017-03-28 NOTE — MR AVS SNAPSHOT
[facility]  [address] [place] [address]  Phone: [phone]  Fax: [phone]                  Dimas Darden   3/28/2017 3:00 PM   Office Visit    Description:  Male : 1965   Provider:  Angela Payne MD   Department:  [facility]           Reason for Visit     Follow-up           Diagnoses this Visit        Comments    Colon cancer screening    -  Primary     Encounter to establish care with new doctor         Screening                To Do List           Goals (5 Years of Data)     None      [facility]     [facility] Nurse Care Line -  Assistance  Registered nurses in the [facility] On Call Center provide clinical advisement, health education, appointment booking, and other advisory services.  Call for this free service at [phone].             Medications           Message regarding Medications     Verify the changes and/or additions to your medication regime listed below are the same as discussed with your clinician today.  If any of these changes or additions are incorrect, please notify your healthcare provider.        STOP taking these medications     ciprofloxacin HCl (CIPRO) 500 MG tablet Take 1 tablet (500 mg total) by mouth 2 (two) times daily.    hydrocodone-acetaminophen 5-325mg (NORCO) 5-325 mg per tablet Take 1 tablet by mouth every 4 (four) hours as needed for Pain.           Verify that the below list of medications is an accurate representation of the medications you are currently taking.  If none reported, the list may be blank. If incorrect, please contact your healthcare provider. Carry this list with you in case of emergency.           Current Medications     promethazine (PHENERGAN) 25 MG tablet Take 1 tablet (25 mg total) by mouth every 6 (six) hours as needed for Nausea.    tizanidine (ZANAFLEX) 4 MG tablet TAKE ONE TABLET BY MOUTH TWICE DAILY AS NEEDED FOR MUSCLE SPASM    tramadol (ULTRAM) 50 mg  "tablet Take 50 mg by mouth.           Clinical Reference Information           Your Vitals Were     BP Pulse Temp Height Weight SpO2    122/80 76 96 °F (35.6 °C) (Tympanic) 6' 2" (1.88 m) 97.9 kg (215 lb 13.3 oz) 96%    BMI                27.71 kg/m2          Blood Pressure          Most Recent Value    BP  122/80      Allergies as of 3/28/2017     No Known Allergies      Immunizations Administered on Date of Encounter - 3/28/2017     None      Orders Placed During Today's Visit     Future Labs/Procedures Expected by Expires    Lipid panel  3/28/2017 6/26/2017    Occult Blood Stool, CA Screen  3/28/2017 3/28/2018      [facility] Sign-Up     Activating your [facility] account is as easy as 1-2-3!     1) Visit [email or web address], select Sign Up Now, enter this activation code and your date of birth, then select Next.  [email or web address]  Expires: 5/4/2017  6:01 PM      2) Create a username and password to use when you visit [facility] in the future and select a security question in case you lose your password and select Next.    3) Enter your e-mail address and click Sign Up!    Additional Information  If you have questions, please e-mail [email or web address] or call [phone] to talk to our [facility] staff. Remember, [facility] is NOT to be used for urgent needs. For medical emergencies, dial 911.         Language Assistance Services     ATTENTION: Language assistance services are available, free of charge. Please call [phone].      ATENCIÓN: Si habla español, tiene a burciaga disposición servicios gratuitos de asistencia lingüística. Llame al [phone].     CHÚ Ý: N?u b?n nói Ti?ng Vi?t, có các d?ch v? h? tr? ngôn ng? mi?n phí dành cho b?n. G?i s? [phone].         [facility] complies with applicable Federal civil rights laws and does not discriminate on the basis of race, color, national origin, age, disability, or sex.        "

## 2017-03-31 ENCOUNTER — LAB VISIT (OUTPATIENT)
Dept: LAB | Facility: HOSPITAL | Age: 52
End: 2017-03-31
Attending: FAMILY MEDICINE
Payer: COMMERCIAL

## 2017-03-31 DIAGNOSIS — Z76.89 ENCOUNTER TO ESTABLISH CARE WITH NEW DOCTOR: ICD-10-CM

## 2017-03-31 DIAGNOSIS — Z13.9 SCREENING: ICD-10-CM

## 2017-03-31 LAB
CHOLEST/HDLC SERPL: 3.4 {RATIO}
HDL/CHOLESTEROL RATIO: 29.4 %
HDLC SERPL-MCNC: 170 MG/DL
HDLC SERPL-MCNC: 50 MG/DL
LDLC SERPL CALC-MCNC: 91.4 MG/DL
NONHDLC SERPL-MCNC: 120 MG/DL
TRIGL SERPL-MCNC: 143 MG/DL

## 2017-03-31 PROCEDURE — 36415 COLL VENOUS BLD VENIPUNCTURE: CPT

## 2017-03-31 PROCEDURE — 80061 LIPID PANEL: CPT

## 2017-04-06 ENCOUNTER — HOSPITAL ENCOUNTER (OUTPATIENT)
Dept: RADIOLOGY | Facility: HOSPITAL | Age: 52
Discharge: HOME | End: 2017-04-06
Attending: FAMILY MEDICINE
Payer: COMMERCIAL

## 2017-04-06 ENCOUNTER — TELEPHONE (OUTPATIENT)
Dept: INTERNAL MEDICINE | Facility: CLINIC | Age: 52
End: 2017-04-06

## 2017-04-06 ENCOUNTER — OFFICE VISIT (OUTPATIENT)
Dept: INTERNAL MEDICINE | Facility: CLINIC | Age: 52
End: 2017-04-06
Payer: COMMERCIAL

## 2017-04-06 VITALS
HEART RATE: 88 BPM | TEMPERATURE: 98 F | SYSTOLIC BLOOD PRESSURE: 118 MMHG | WEIGHT: 221.31 LBS | DIASTOLIC BLOOD PRESSURE: 90 MMHG | OXYGEN SATURATION: 97 % | HEIGHT: 74 IN | BODY MASS INDEX: 28.4 KG/M2

## 2017-04-06 DIAGNOSIS — R10.9 ABDOMINAL PAIN, UNSPECIFIED LOCATION: ICD-10-CM

## 2017-04-06 DIAGNOSIS — Z87.19 HISTORY OF DIVERTICULITIS: ICD-10-CM

## 2017-04-06 DIAGNOSIS — Z87.19 HISTORY OF DIVERTICULITIS: Primary | ICD-10-CM

## 2017-04-06 PROCEDURE — 99213 OFFICE O/P EST LOW 20 MIN: CPT | Mod: S$GLB,,, | Performed by: FAMILY MEDICINE

## 2017-04-06 PROCEDURE — 74020 XR ABDOMEN FLAT AND ERECT: CPT | Mod: 26,,, | Performed by: RADIOLOGY

## 2017-04-06 PROCEDURE — 74020 XR ABDOMEN FLAT AND ERECT: CPT | Mod: TC

## 2017-04-06 PROCEDURE — 1160F RVW MEDS BY RX/DR IN RCRD: CPT | Mod: S$GLB,,, | Performed by: FAMILY MEDICINE

## 2017-04-06 PROCEDURE — 99999 PR PBB SHADOW E&M-EST. PATIENT-LVL III: CPT | Mod: PBBFAC,,, | Performed by: FAMILY MEDICINE

## 2017-04-06 RX ORDER — METRONIDAZOLE 500 MG/1
500 TABLET ORAL 3 TIMES DAILY
COMMUNITY
End: 2017-04-06 | Stop reason: SDUPTHER

## 2017-04-06 RX ORDER — METRONIDAZOLE 500 MG/1
500 TABLET ORAL 3 TIMES DAILY
Qty: 30 TABLET | Refills: 0 | Status: SHIPPED | OUTPATIENT
Start: 2017-04-06 | End: 2017-08-04 | Stop reason: SDUPTHER

## 2017-04-06 RX ORDER — CIPROFLOXACIN 500 MG/1
500 TABLET ORAL
COMMUNITY
End: 2017-04-06 | Stop reason: SDUPTHER

## 2017-04-06 RX ORDER — CIPROFLOXACIN 500 MG/1
500 TABLET ORAL EVERY 12 HOURS
Qty: 20 TABLET | Refills: 0 | Status: SHIPPED | OUTPATIENT
Start: 2017-04-06 | End: 2017-04-13

## 2017-04-06 NOTE — TELEPHONE ENCOUNTER
----- Message from Angela Payne MD sent at 4/6/2017  1:12 PM CDT -----  Xray negative. WBCs elevated today. Likely infection as suspected. Antibiotics as directed and follow up with GI

## 2017-04-06 NOTE — MR AVS SNAPSHOT
[facility] Internal Medicine  [address] [facility] [address]  Phone: [phone]  Fax: [phone]                  Dimas Darden   2017 10:20 AM   Office Visit    Description:  Male : 1965   Provider:  Angela Payne MD   Department:  [facility]           Reason for Visit     Abdominal Pain           Diagnoses this Visit        Comments    History of diverticulitis    -  Primary     Abdominal pain, unspecified location                To Do List           Future Appointments        Provider Department Dept Phone    2017 11:45 AM ONLH XR1- [facility] [phone]    2017 2:15 PM LAB, SAME DAY [facility] [phone]    3/28/2018 2:00 PM Angela Payne MD [facility] [phone]      Goals (5 Years of Data)     None       These Medications        Disp Refills Start End    metronidazole (FLAGYL) 500 MG tablet 30 tablet 0 2017     Take 1 tablet (500 mg total) by mouth 3 (three) times daily. - Oral    Pharmacy: [facility] [address] [place] [address] Ph #: [phone]       ciprofloxacin HCl (CIPRO) 500 MG tablet 20 tablet 0 2017     Take 1 tablet (500 mg total) by mouth every 12 (twelve) hours. - Oral    Pharmacy: [facility] [address] [place] [address] Ph #: [phone]         [facility]     [facility] Care [facility] -  Assistance  Unless otherwise directed by your provider, please contact [facility], our nurse care line that is available for / assistance.     Registered nurses in the [facility] provide: appointment scheduling, clinical advisement, health education, and other advisory services.  Call: [phone] (toll free)               Medications           Message regarding Medications     Verify the changes and/or additions to your medication regime listed below are the same as  "discussed with your clinician today.  If any of these changes or additions are incorrect, please notify your healthcare provider.        START taking these NEW medications        Refills    metronidazole (FLAGYL) 500 MG tablet 0    Sig: Take 1 tablet (500 mg total) by mouth 3 (three) times daily.    Class: Normal    Route: Oral    ciprofloxacin HCl (CIPRO) 500 MG tablet 0    Sig: Take 1 tablet (500 mg total) by mouth every 12 (twelve) hours.    Class: Normal    Route: Oral           Verify that the below list of medications is an accurate representation of the medications you are currently taking.  If none reported, the list may be blank. If incorrect, please contact your healthcare provider. Carry this list with you in case of emergency.           Current Medications     ciprofloxacin HCl (CIPRO) 500 MG tablet Take 1 tablet (500 mg total) by mouth every 12 (twelve) hours.    metronidazole (FLAGYL) 500 MG tablet Take 1 tablet (500 mg total) by mouth 3 (three) times daily.    tizanidine (ZANAFLEX) 4 MG tablet TAKE ONE TABLET BY MOUTH TWICE DAILY AS NEEDED FOR MUSCLE SPASM    tramadol (ULTRAM) 50 mg tablet Take 50 mg by mouth.    promethazine (PHENERGAN) 25 MG tablet Take 1 tablet (25 mg total) by mouth every 6 (six) hours as needed for Nausea.           Clinical Reference Information           Your Vitals Were     BP Pulse Temp Height    118/90 (BP Location: Left arm, Patient Position: Sitting, BP Method: Manual) 88 97.6 °F (36.4 °C) (Tympanic) 6' 2" (1.88 m)    Weight SpO2 BMI    100.4 kg (221 lb 5.5 oz) 97% 28.42 kg/m2      Blood Pressure          Most Recent Value    BP  (!)  118/90      Allergies as of 4/6/2017     No Known Allergies      Immunizations Administered on Date of Encounter - 4/6/2017     None      Orders Placed During Today's Visit      Normal Orders This Visit    Ambulatory Referral to Gastroenterology     Future Labs/Procedures Expected by Expires    CBC auto differential  4/6/2017 7/5/2017    " Comprehensive metabolic panel  4/6/2017 6/5/2018    X-Ray Abdomen Flat And Erect  4/6/2017 4/6/2018      [facility] Sign-Up     Activating your [facility] account is as easy as 1-2-3!     1) Visit [email or web address], select Sign Up Now, enter this activation code and your date of birth, then select Next.  [email or web address]  Expires: 5/4/2017  6:01 PM      2) Create a username and password to use when you visit [facility] in the future and select a security question in case you lose your password and select Next.    3) Enter your e-mail address and click Sign Up!    Additional Information  If you have questions, please e-mail [email or web address] or call [phone] to talk to our [facility] staff. Remember, [facility] is NOT to be used for urgent needs. For medical emergencies, dial 911.         Language Assistance Services     ATTENTION: Language assistance services are available, free of charge. Please call [phone].      ATENCIÓN: Si habla español, tiene a burciaga disposición servicios gratuitos de asistencia lingüística. Llame al [phone].     CHÚ Ý: N?u b?n nói Ti?ng Vi?t, có các d?ch v? h? tr? ngôn ng? mi?n phí dành cho b?n. G?i s? [phone].         [facility] complies with applicable Federal civil rights laws and does not discriminate on the basis of race, color, national origin, age, disability, or sex.

## 2017-04-06 NOTE — PROGRESS NOTES
Subjective:       Patient ID: Dimas Darden is a 51 y.o. male.    Chief Complaint: Abdominal Pain    HPI Mr. Darden presents today with abdominal pain on the left lower part of his abdomen. He has ahistory of diverticulitis and each time the pain starts in different spots. This time he started having pain left lower quadrant. He has been treated multiple times. With the last episode surgery discussed removing part of the colon. Restarted antibiotics he had left over last night and has had some improvement. He has not eaten since the pain started.     Review of Systems   Constitutional: Negative for chills, fatigue and fever.   Gastrointestinal: Positive for abdominal pain and nausea. Negative for abdominal distention, constipation, diarrhea and vomiting.         Objective:        Physical Exam   Constitutional: He is oriented to person, place, and time. He appears well-developed and well-nourished.   HENT:   Head: Normocephalic and atraumatic.   Cardiovascular: Normal heart sounds.    Pulmonary/Chest: Breath sounds normal.   Abdominal: Soft. Bowel sounds are normal. There is tenderness (LLQ).   Neurological: He is alert and oriented to person, place, and time.   Nursing note and vitals reviewed.        Assessment/Plan:     History of diverticulitis  -     metronidazole (FLAGYL) 500 MG tablet; Take 1 tablet (500 mg total) by mouth 3 (three) times daily.  Dispense: 30 tablet; Refill: 0  -     ciprofloxacin HCl (CIPRO) 500 MG tablet; Take 1 tablet (500 mg total) by mouth every 12 (twelve) hours.  Dispense: 20 tablet; Refill: 0  -     X-Ray Abdomen Flat And Erect; Future; Expected date: 4/6/17  -     Ambulatory Referral to Gastroenterology  -     Cancel: CBC auto differential; Future; Expected date: 4/6/17  -     Comprehensive metabolic panel; Future; Expected date: 4/6/17  -     CBC auto differential; Future; Expected date: 4/6/17    Abdominal pain, unspecified location  -     metronidazole (FLAGYL) 500 MG tablet;  Take 1 tablet (500 mg total) by mouth 3 (three) times daily.  Dispense: 30 tablet; Refill: 0  -     ciprofloxacin HCl (CIPRO) 500 MG tablet; Take 1 tablet (500 mg total) by mouth every 12 (twelve) hours.  Dispense: 20 tablet; Refill: 0  -     X-Ray Abdomen Flat And Erect; Future; Expected date: 4/6/17  -     Ambulatory Referral to Gastroenterology  -     Cancel: CBC auto differential; Future; Expected date: 4/6/17  -     Comprehensive metabolic panel; Future; Expected date: 4/6/17  -     CBC auto differential; Future; Expected date: 4/6/17      Xray with concern for perforation-looking for air-Bowel gas pattern is unremarkable.  No definite evidence of obstruction.  No pneumoperitoneum demonstrated.  No definite evidence of urolithiasis.  Visualized lung bases appear clear. No acute findings.   Follow up with GI and then will decide/discuss surgery follow up. He was not eager to have surgery. Mother had complications with similar procedure.   Discussed diet and foods to potentially avoid.     Return if symptoms worsen or fail to improve.    Angela Payne MD  [facility]   Family Medicine

## 2017-04-10 ENCOUNTER — INITIAL CONSULT (OUTPATIENT)
Dept: GASTROENTEROLOGY | Facility: CLINIC | Age: 52
End: 2017-04-10
Payer: COMMERCIAL

## 2017-04-10 ENCOUNTER — TELEPHONE (OUTPATIENT)
Dept: INTERNAL MEDICINE | Facility: CLINIC | Age: 52
End: 2017-04-10

## 2017-04-10 VITALS
WEIGHT: 219.38 LBS | HEIGHT: 74 IN | DIASTOLIC BLOOD PRESSURE: 80 MMHG | BODY MASS INDEX: 28.15 KG/M2 | SYSTOLIC BLOOD PRESSURE: 120 MMHG | HEART RATE: 78 BPM

## 2017-04-10 DIAGNOSIS — K57.33 DIVERTICULITIS LARGE INTESTINE W/O PERFORATION OR ABSCESS W/BLEEDING: Primary | ICD-10-CM

## 2017-04-10 PROCEDURE — 99999 PR PBB SHADOW E&M-EST. PATIENT-LVL III: CPT | Mod: PBBFAC,,, | Performed by: INTERNAL MEDICINE

## 2017-04-10 PROCEDURE — 1160F RVW MEDS BY RX/DR IN RCRD: CPT | Mod: S$GLB,,, | Performed by: INTERNAL MEDICINE

## 2017-04-10 PROCEDURE — 99203 OFFICE O/P NEW LOW 30 MIN: CPT | Mod: S$GLB,,, | Performed by: INTERNAL MEDICINE

## 2017-04-10 NOTE — PROGRESS NOTES
Subjective:       Patient ID: Dimas Darden is a 51 y.o. male.    Chief Complaint: Diverticulitis and Abdominal Cramping    HPI Comments: 3 bouts of Diverticulitis over the last 3 months. He had his 1st bout over 10 years ago; but now has had 3 bouts in a row. He states he is generally onn high fiber diet, but has never been told to avoid fiber during the acute bouts of diverticulitis. He has had 2 bouts involving the LLQ and one in his Ascending colon. He has had the latter documented in the ED by CT scan and leukocytosis. The most recent being treated now.    Abdominal Cramping   Associated symptoms include a fever. Pertinent negatives include no arthralgias, constipation, diarrhea, dysuria, frequency, headaches, hematuria, myalgias, nausea or vomiting.     Review of Systems   Constitutional: Positive for chills and fever. Negative for activity change, appetite change, diaphoresis, fatigue and unexpected weight change.   HENT: Negative for congestion, ear discharge, facial swelling, hearing loss, nosebleeds, postnasal drip, sinus pressure, sneezing, tinnitus, trouble swallowing and voice change.    Eyes: Negative for photophobia, redness and visual disturbance.   Respiratory: Negative for cough, chest tightness, shortness of breath and wheezing.    Cardiovascular: Negative for chest pain and palpitations.   Gastrointestinal: Negative for abdominal distention, abdominal pain, blood in stool, constipation, diarrhea, nausea, rectal pain and vomiting.   Genitourinary: Negative for difficulty urinating, discharge, dysuria, flank pain, frequency, hematuria, scrotal swelling, testicular pain and urgency.   Musculoskeletal: Positive for back pain. Negative for arthralgias, gait problem, joint swelling, myalgias and neck stiffness.   Skin: Negative for color change, pallor, rash and wound.   Neurological: Negative for dizziness, tremors, seizures, syncope, facial asymmetry, speech difficulty, weakness,  light-headedness, numbness and headaches.   Hematological: Negative for adenopathy. Does not bruise/bleed easily.   Psychiatric/Behavioral: Negative for agitation, confusion, hallucinations, sleep disturbance and suicidal ideas.       Objective:      Physical Exam   Constitutional: He is oriented to person, place, and time. He appears well-developed and well-nourished. No distress.   HENT:   Head: Normocephalic and atraumatic.   Nose: Nose normal.   Mouth/Throat: Oropharynx is clear and moist. No oropharyngeal exudate.   Eyes: Conjunctivae are normal. Pupils are equal, round, and reactive to light. No scleral icterus.   Neck: Normal range of motion. Neck supple. No thyromegaly present.   Cardiovascular: Normal rate and regular rhythm.  Exam reveals no gallop and no friction rub.    No murmur heard.  Pulmonary/Chest: Effort normal and breath sounds normal. No respiratory distress. He has no wheezes. He has no rales.   Abdominal: Soft. Bowel sounds are normal. He exhibits no distension and no mass. There is tenderness. There is no rebound and no guarding.   Tenderness in the LLQ.   Musculoskeletal: He exhibits no edema or tenderness.   Lymphadenopathy:     He has no cervical adenopathy.   Neurological: He is alert and oriented to person, place, and time. He exhibits normal muscle tone. Coordination normal.   Skin: Skin is warm. No rash noted. He is not diaphoretic.   Psychiatric: He has a normal mood and affect. His behavior is normal. Judgment and thought content normal.   Vitals reviewed.      Assessment:     Diverticulitis  No diagnosis found.    Plan:     Handout information given to the patient on this disease.  We have discussed with the patient the need to avoid fiber in on the acute process.  We discussed how this could exacerbate a acute process of his disease.  He states he has never been told this, but instead has always told that high fiber would be of benefit.  We stressed to him that this is true only  after the acute event has been several for an extended period of time, namely 4 is 4-6 weeks.  He is to continue his antibiotic therapy, and is cautioned that continued bouts of diverticulitis may lead to her visit to general surgery.

## 2017-04-10 NOTE — LETTER
April 17, 2017      Angela Payne MD  [address] [place] [address] Dr Jeff [place] [id number]           [facility]  [address] [place] [address] [place] [address]  Phone: [phone]  Fax: [phone]          Patient: Dimas Darden   MR Number: [id number]   YOB: 1965   Date of Visit: 4/10/2017       Dear Dr. Angela Payne:    Thank you for referring Dimas Darden to me for evaluation. Attached you will find relevant portions of my assessment and plan of care.    If you have questions, please do not hesitate to call me. I look forward to following Dimas Darden along with you.    Sincerely,    Klaus Mcdermott III, MD    Enclosure  CC:  No Recipients    If you would like to receive this communication electronically, please contact [email or web address] or [phone] to request more information on [employer] Link access.    For providers and/or their staff who would like to refer a patient to [facility], please contact us through our one-stop-shop provider referral line, [facility], at [phone].    If you feel you have received this communication in error or would no longer like to receive these types of communications, please e-mail [email or web address]

## 2017-04-11 ENCOUNTER — PATIENT MESSAGE (OUTPATIENT)
Dept: INTERNAL MEDICINE | Facility: CLINIC | Age: 52
End: 2017-04-11

## 2017-04-13 RX ORDER — TIZANIDINE 4 MG/1
4 TABLET ORAL 2 TIMES DAILY PRN
Qty: 30 TABLET | Refills: 0 | Status: SHIPPED | OUTPATIENT
Start: 2017-04-13 | End: 2017-04-26 | Stop reason: SDUPTHER

## 2017-04-13 RX ORDER — TRAMADOL HYDROCHLORIDE 50 MG/1
50 TABLET ORAL EVERY 6 HOURS PRN
Qty: 40 TABLET | Refills: 0 | Status: SHIPPED | OUTPATIENT
Start: 2017-04-13 | End: 2017-04-26 | Stop reason: SDUPTHER

## 2017-04-14 ENCOUNTER — PATIENT MESSAGE (OUTPATIENT)
Dept: INTERNAL MEDICINE | Facility: CLINIC | Age: 52
End: 2017-04-14

## 2017-04-25 ENCOUNTER — PATIENT MESSAGE (OUTPATIENT)
Dept: INTERNAL MEDICINE | Facility: CLINIC | Age: 52
End: 2017-04-25

## 2017-04-26 RX ORDER — TRAMADOL HYDROCHLORIDE 50 MG/1
50 TABLET ORAL EVERY 6 HOURS PRN
Qty: 40 TABLET | Refills: 0 | Status: SHIPPED | OUTPATIENT
Start: 2017-04-26 | End: 2017-07-16 | Stop reason: SDUPTHER

## 2017-04-26 RX ORDER — TIZANIDINE 4 MG/1
4 TABLET ORAL 2 TIMES DAILY PRN
Qty: 30 TABLET | Refills: 0 | Status: SHIPPED | OUTPATIENT
Start: 2017-04-26 | End: 2017-12-07 | Stop reason: SDUPTHER

## 2017-07-17 RX ORDER — TRAMADOL HYDROCHLORIDE 50 MG/1
TABLET ORAL
Qty: 40 TABLET | Refills: 0 | Status: SHIPPED | OUTPATIENT
Start: 2017-07-17 | End: 2017-09-13 | Stop reason: SDUPTHER

## 2017-08-04 ENCOUNTER — PATIENT MESSAGE (OUTPATIENT)
Dept: INTERNAL MEDICINE | Facility: CLINIC | Age: 52
End: 2017-08-04

## 2017-08-04 DIAGNOSIS — Z87.19 HISTORY OF DIVERTICULITIS: ICD-10-CM

## 2017-08-04 DIAGNOSIS — R10.9 ABDOMINAL PAIN, UNSPECIFIED LOCATION: ICD-10-CM

## 2017-08-04 RX ORDER — METRONIDAZOLE 500 MG/1
500 TABLET ORAL 3 TIMES DAILY
Qty: 30 TABLET | Refills: 0 | Status: SHIPPED | OUTPATIENT
Start: 2017-08-04 | End: 2017-12-07

## 2017-08-04 RX ORDER — METRONIDAZOLE 500 MG/1
500 TABLET ORAL 3 TIMES DAILY
Qty: 30 TABLET | Refills: 0 | Status: CANCELLED | OUTPATIENT
Start: 2017-08-04

## 2017-08-04 RX ORDER — METRONIDAZOLE 500 MG/1
500 TABLET ORAL 3 TIMES DAILY
Qty: 30 TABLET | Refills: 0 | OUTPATIENT
Start: 2017-08-04

## 2017-08-04 RX ORDER — CIPROFLOXACIN 500 MG/1
TABLET ORAL
Qty: 20 TABLET | Refills: 0 | OUTPATIENT
Start: 2017-08-04

## 2017-08-04 RX ORDER — METRONIDAZOLE 500 MG/1
TABLET ORAL
Qty: 30 TABLET | Refills: 0 | OUTPATIENT
Start: 2017-08-04

## 2017-08-07 ENCOUNTER — PATIENT MESSAGE (OUTPATIENT)
Dept: INTERNAL MEDICINE | Facility: CLINIC | Age: 52
End: 2017-08-07

## 2017-08-07 ENCOUNTER — TELEPHONE (OUTPATIENT)
Dept: INTERNAL MEDICINE | Facility: CLINIC | Age: 52
End: 2017-08-07

## 2017-08-07 RX ORDER — CIPROFLOXACIN 500 MG/1
500 TABLET ORAL EVERY 12 HOURS
Qty: 24 TABLET | Refills: 0 | Status: SHIPPED | OUTPATIENT
Start: 2017-08-07 | End: 2017-12-07

## 2017-08-16 RX ORDER — TRAMADOL HYDROCHLORIDE 50 MG/1
TABLET ORAL
Qty: 40 TABLET | Refills: 0 | OUTPATIENT
Start: 2017-08-16

## 2017-09-14 RX ORDER — TRAMADOL HYDROCHLORIDE 50 MG/1
TABLET ORAL
Qty: 40 TABLET | Refills: 0 | Status: SHIPPED | OUTPATIENT
Start: 2017-09-14 | End: 2017-12-08 | Stop reason: SDUPTHER

## 2017-11-06 RX ORDER — TRAMADOL HYDROCHLORIDE 50 MG/1
TABLET ORAL
Qty: 40 TABLET | Refills: 0 | OUTPATIENT
Start: 2017-11-06

## 2017-11-07 RX ORDER — TIZANIDINE 4 MG/1
4 TABLET ORAL 2 TIMES DAILY PRN
Qty: 30 TABLET | Refills: 0 | OUTPATIENT
Start: 2017-11-07

## 2017-11-10 RX ORDER — TRAMADOL HYDROCHLORIDE 50 MG/1
TABLET ORAL
Qty: 40 TABLET | Refills: 0 | OUTPATIENT
Start: 2017-11-10

## 2017-11-10 RX ORDER — TIZANIDINE 4 MG/1
TABLET ORAL
Qty: 30 TABLET | Refills: 0 | OUTPATIENT
Start: 2017-11-10

## 2017-12-07 ENCOUNTER — OFFICE VISIT (OUTPATIENT)
Dept: INTERNAL MEDICINE | Facility: CLINIC | Age: 52
End: 2017-12-07
Payer: COMMERCIAL

## 2017-12-07 VITALS
HEART RATE: 68 BPM | OXYGEN SATURATION: 97 % | BODY MASS INDEX: 27.73 KG/M2 | HEIGHT: 74 IN | DIASTOLIC BLOOD PRESSURE: 84 MMHG | WEIGHT: 216.06 LBS | SYSTOLIC BLOOD PRESSURE: 122 MMHG | TEMPERATURE: 97 F

## 2017-12-07 DIAGNOSIS — G89.29 CHRONIC LOW BACK PAIN WITHOUT SCIATICA, UNSPECIFIED BACK PAIN LATERALITY: Primary | ICD-10-CM

## 2017-12-07 DIAGNOSIS — M54.50 CHRONIC LOW BACK PAIN WITHOUT SCIATICA, UNSPECIFIED BACK PAIN LATERALITY: Primary | ICD-10-CM

## 2017-12-07 PROCEDURE — 99999 PR PBB SHADOW E&M-EST. PATIENT-LVL III: CPT | Mod: PBBFAC,,, | Performed by: FAMILY MEDICINE

## 2017-12-07 PROCEDURE — 99213 OFFICE O/P EST LOW 20 MIN: CPT | Mod: S$GLB,,, | Performed by: FAMILY MEDICINE

## 2017-12-07 RX ORDER — TIZANIDINE 4 MG/1
4 TABLET ORAL 2 TIMES DAILY PRN
Qty: 60 TABLET | Refills: 2 | Status: SHIPPED | OUTPATIENT
Start: 2017-12-07 | End: 2018-07-11 | Stop reason: SDUPTHER

## 2017-12-07 NOTE — PROGRESS NOTES
Subjective:       Patient ID: Dimas Darden is a 52 y.o. male.    Chief Complaint: Medication Refill    HPI Mr. Darden presents today for medication refill. He gets his medications mixed up he is not sure which between the muscle relaxer and pain pill makes him sleepy. He thinks it is the pain pill and wants a refill on muscle relaxer.   He has changed diet and has not had any more flares of abdominal pain related to diverticulitis.     Review of Systems   Constitutional: Negative for fever.   Cardiovascular: Negative for chest pain.   Gastrointestinal: Negative for abdominal pain.   Genitourinary: Negative for dysuria and hematuria.   Musculoskeletal: Positive for back pain.   Neurological: Negative for weakness, numbness and headaches.           Past Medical History:   Diagnosis Date    Disc     Bulging disc in back    Diverticulitis      Objective:        Physical Exam   Constitutional: He is oriented to person, place, and time. He appears well-developed and well-nourished.   HENT:   Head: Normocephalic and atraumatic.   Eyes: EOM are normal. Pupils are equal, round, and reactive to light.   Neck: Normal range of motion. Neck supple.   Cardiovascular: Normal heart sounds.    Pulmonary/Chest: Breath sounds normal.   Musculoskeletal: Normal range of motion.   Neurological: He is alert and oriented to person, place, and time.   Vitals reviewed.        Assessment/Plan:     Chronic low back pain without sciatica, unspecified back pain laterality  -     tiZANidine (ZANAFLEX) 4 MG tablet; Take 1 tablet (4 mg total) by mouth 2 (two) times daily as needed.  Dispense: 60 tablet; Refill: 2    Call back if Tramadol needs to be refilled.       Return if symptoms worsen or fail to improve.    Angela Payne MD  [facility]   Family Medicine

## 2017-12-08 RX ORDER — TRAMADOL HYDROCHLORIDE 50 MG/1
50 TABLET ORAL EVERY 6 HOURS PRN
Qty: 40 TABLET | Refills: 0 | Status: SHIPPED | OUTPATIENT
Start: 2017-12-08 | End: 2018-01-24 | Stop reason: SDUPTHER

## 2018-01-24 DIAGNOSIS — Z87.19 HISTORY OF DIVERTICULITIS: ICD-10-CM

## 2018-01-25 RX ORDER — METRONIDAZOLE 500 MG/1
TABLET ORAL
Qty: 30 TABLET | Refills: 0 | Status: SHIPPED | OUTPATIENT
Start: 2018-01-25 | End: 2018-06-27 | Stop reason: SDUPTHER

## 2018-01-25 RX ORDER — CIPROFLOXACIN 500 MG/1
TABLET ORAL
Qty: 24 TABLET | Refills: 0 | Status: SHIPPED | OUTPATIENT
Start: 2018-01-25 | End: 2018-06-27 | Stop reason: SDUPTHER

## 2018-01-25 RX ORDER — TRAMADOL HYDROCHLORIDE 50 MG/1
TABLET ORAL
Qty: 40 TABLET | Refills: 0 | Status: SHIPPED | OUTPATIENT
Start: 2018-01-25 | End: 2018-07-11 | Stop reason: SDUPTHER

## 2018-04-02 RX ORDER — TRAMADOL HYDROCHLORIDE 50 MG/1
TABLET ORAL
Qty: 40 TABLET | Refills: 0 | OUTPATIENT
Start: 2018-04-02

## 2018-06-15 DIAGNOSIS — Z87.19 HISTORY OF DIVERTICULITIS: ICD-10-CM

## 2018-06-15 RX ORDER — CIPROFLOXACIN 500 MG/1
500 TABLET ORAL EVERY 12 HOURS
Qty: 24 TABLET | Refills: 0 | OUTPATIENT
Start: 2018-06-15

## 2018-06-15 RX ORDER — METRONIDAZOLE 500 MG/1
500 TABLET ORAL 3 TIMES DAILY
Qty: 30 TABLET | Refills: 0 | OUTPATIENT
Start: 2018-06-15

## 2018-06-15 NOTE — TELEPHONE ENCOUNTER
"Informed pt that he would have to be seen in office for the refill. Pt stated that " I will just fine another Doctor". KF replied " I am sorry to hear that you will be leaving us. I hope you enjoy the rest of your day."  "

## 2018-06-15 NOTE — TELEPHONE ENCOUNTER
"Pt was informed his last office visit was more than six months ago and that he would need to schedule an appt to have meds refilled. Pt stated "I can not afford to pay $140 for a office visit every time I need a refill on my medication so my stomach won't flare up. Y'all will be forcing me to find a new doctor if I have to come in." Pt requested to have medications refilled without having to schedule an office visit as per Dr. Payne.  "

## 2018-06-15 NOTE — TELEPHONE ENCOUNTER
We have not required him to come in every time he has had a flare. I have refilled this medication at least 2-3 times without him coming in. Most of the time I don't send antibiotics without seeing a patient. I am fine with him finding another physician if he does not want to come in for an appointment.

## 2018-06-27 ENCOUNTER — OFFICE VISIT (OUTPATIENT)
Dept: INTERNAL MEDICINE | Facility: CLINIC | Age: 53
End: 2018-06-27
Payer: COMMERCIAL

## 2018-06-27 VITALS
DIASTOLIC BLOOD PRESSURE: 76 MMHG | HEART RATE: 67 BPM | WEIGHT: 226 LBS | OXYGEN SATURATION: 97 % | TEMPERATURE: 98 F | BODY MASS INDEX: 29 KG/M2 | HEIGHT: 74 IN | SYSTOLIC BLOOD PRESSURE: 126 MMHG

## 2018-06-27 DIAGNOSIS — Z87.19 HISTORY OF DIVERTICULITIS: ICD-10-CM

## 2018-06-27 PROCEDURE — 99999 PR PBB SHADOW E&M-EST. PATIENT-LVL III: CPT | Mod: PBBFAC,,, | Performed by: NURSE PRACTITIONER

## 2018-06-27 PROCEDURE — 99214 OFFICE O/P EST MOD 30 MIN: CPT | Mod: S$GLB,,, | Performed by: NURSE PRACTITIONER

## 2018-06-27 PROCEDURE — 3008F BODY MASS INDEX DOCD: CPT | Mod: CPTII,S$GLB,, | Performed by: NURSE PRACTITIONER

## 2018-06-27 RX ORDER — METRONIDAZOLE 500 MG/1
500 TABLET ORAL 3 TIMES DAILY
Qty: 30 TABLET | Refills: 0 | Status: SHIPPED | OUTPATIENT
Start: 2018-06-27 | End: 2019-02-15 | Stop reason: ALTCHOICE

## 2018-06-27 RX ORDER — CIPROFLOXACIN 500 MG/1
500 TABLET ORAL EVERY 12 HOURS
Qty: 24 TABLET | Refills: 0 | Status: SHIPPED | OUTPATIENT
Start: 2018-06-27 | End: 2018-10-20

## 2018-06-27 NOTE — PROGRESS NOTES
Subjective:       Patient ID: Dimas Darden is a 52 y.o. male.    Chief Complaint: Abdominal Pain    Patient presents with recurrent abdominal pain.      Abdominal Pain   This is a recurrent problem. The current episode started yesterday. The onset quality is gradual. The problem occurs constantly. The most recent episode lasted 3 days. The problem has been waxing and waning. The pain is located in the LLQ. The pain is at a severity of 5/10. The pain is mild. The quality of the pain is sharp. The abdominal pain radiates to the LLQ. Pertinent negatives include no anorexia, arthralgias, belching, constipation, diarrhea, dysuria, fever, flatus, frequency, headaches, hematochezia, hematuria, melena, myalgias, nausea, vomiting or weight loss. The pain is aggravated by movement. The pain is relieved by bowel movements. He has tried antacids for the symptoms. The treatment provided mild relief. His past medical history is significant for GERD. There is no history of abdominal surgery, colon cancer, Crohn's disease, gallstones, irritable bowel syndrome, pancreatitis, PUD or ulcerative colitis. Patient's medical history does not include kidney stones and UTI.     Review of Systems   Constitutional: Negative for chills, fever and weight loss.   Gastrointestinal: Positive for abdominal pain. Negative for anorexia, blood in stool, constipation, diarrhea, flatus, hematochezia, melena, nausea and vomiting.   Genitourinary: Negative for dysuria, frequency and hematuria.   Musculoskeletal: Negative for arthralgias and myalgias.   Neurological: Negative for headaches.       Objective:      Physical Exam   Constitutional: He is oriented to person, place, and time. He appears well-developed and well-nourished. No distress.   HENT:   Head: Normocephalic and atraumatic.   Eyes: Conjunctivae and EOM are normal. Pupils are equal, round, and reactive to light.   Cardiovascular: Normal rate and regular rhythm.  Exam reveals no gallop  and no friction rub.    No murmur heard.  Pulmonary/Chest: Effort normal and breath sounds normal.   Abdominal: There is tenderness in the left lower quadrant. There is no rigidity and no guarding.   Neurological: He is alert and oriented to person, place, and time.   Skin: Skin is warm and dry.   Psychiatric: He has a normal mood and affect.   Vitals reviewed.      Assessment:       1. History of diverticulitis        Plan:   History of diverticulitis  -     ciprofloxacin HCl (CIPRO) 500 MG tablet; Take 1 tablet (500 mg total) by mouth every 12 (twelve) hours.  Dispense: 24 tablet; Refill: 0  -     metroNIDAZOLE (FLAGYL) 500 MG tablet; Take 1 tablet (500 mg total) by mouth 3 (three) times daily.  Dispense: 30 tablet; Refill: 0      Tried to order/schedule colonoscopy and GI follow up. Patient refused due to financial reasons.   Diet discussed and handout given.  Patient upset over having to come in for visit. He reports having to pay $140 with each visit. He stated that if we would not send in antibiotics for him with each recurrence, he will find a new doctor. Discussed with patient that that is his choice but most providers will want to see a patient for acute visits such as this.   Advised to seek immediate medical attention for fever, chills, worsening abdominal pain, N/V or blood in stool. Patient verbalized understanding.     Follow-up if symptoms worsen or fail to improve.

## 2018-06-27 NOTE — PATIENT INSTRUCTIONS
Diverticulitis    Some people get pouches along the wall of the colon as they get older. The pouches, called diverticuli, usually cause no symptoms. If the pouches become blocked, you can get an infection. This infection is called diverticulitis. It causes pain in your lower abdomen and fever. If not treated, it can become a serious condition, causing an abscess to form inside the pouch. The abscess may block the intestinal tract even or rupture, spreading infection throughout the abdomen.  When treatment is started early, oral antibiotics alone may be enough to cure diverticulitis. This method is tried first. But, if you don't improve or if your condition gets worse while using oral antibiotics, you may need to be admitted to the hospital for IV antibiotics. Severe cases may require surgery.  Home care  The following guidelines will help you care for yourself at home:  · During the acute illness, rest and follow your healthcare provider's instructions about diet. Sometimes you will need to follow a clear liquid diet to rest your bowel. Once your symptoms are better, you may be told to follow a low-fiber diet for some time. Include foods like:  ¨ Flake cereal, mashed potatoes, pancakes, waffles, pasta, white bread, rice, applesauce, bananas, eggs, fish, poultry, tofu, and cooked soft vegetables  · Take antibiotics exactly as instructed. Don't miss any doses or stop taking the medication, even if you feel better.  · Monitor your temperature and tell your healthcare provider if you have rising temperatures.  Preventing future attacks  Once you have an episode of diverticulitis, you are at risk for having it again. After you have recovered from this episode, you may be able to lower your risk by eating a high-fiber diet (20 gm/day to 35 gm/day of fiber). This cleans out the colon pouches that already exist and may prevent new ones from forming. Foods high in fiber include fresh fruits and edible peelings, raw or  lightly cooked vegetables, whole grain cereals and breads, dried beans and peas, and bran.  Other steps that can help prevent future attacks include:  · Take your medicines, such as antibiotics, as your healthcare provider says.  · Drink 6 to 8 glasses of water every day, unless told otherwise.  · Use a heating pad or hot water bottle to help abdominal cramping or pain.  · Begin an exercise program. Ask your healthcare provider how to get started. You can benefit from simple activities such as walking or gardening.  · Treat diarrhea with a bland diet. Start with liquids only; then slowly add fiber over time.  · Watch for changes in your bowel movements (constipation to diarrhea). Avoid constipation by eating a high fiber diet and taking a stool softener if needed.  · Get plenty of rest and sleep.  Follow-up care  Follow up with your healthcare provider as advised or sooner if you are not getting better in the next 2 days.  When to seek medical advice  Call your healthcare provider right away if any of these occur:  · Fever of 100.4°F (38°C) or higher, or as directed by your healthcare provider  · Repeated vomiting or swelling of the abdomen  · Weakness, dizziness, light-headedness  · Pain in your abdomen that gets worse, severe, or spreads to your back  · Pain that moves to the right lower abdomen  · Rectal bleeding (stools that are red, black or maroon color)  · Unexpected vaginal bleeding  Date Last Reviewed: 9/1/2016  © 8103-1198 [employer]. [address] [place] [address], [place] [address]. All rights reserved. This information is not intended as a substitute for professional medical care. Always follow your healthcare professional's instructions.

## 2018-07-11 DIAGNOSIS — M54.50 CHRONIC LOW BACK PAIN WITHOUT SCIATICA, UNSPECIFIED BACK PAIN LATERALITY: ICD-10-CM

## 2018-07-11 DIAGNOSIS — G89.29 CHRONIC LOW BACK PAIN WITHOUT SCIATICA, UNSPECIFIED BACK PAIN LATERALITY: ICD-10-CM

## 2018-07-11 RX ORDER — TIZANIDINE 4 MG/1
4 TABLET ORAL 2 TIMES DAILY PRN
Qty: 60 TABLET | Refills: 2 | Status: SHIPPED | OUTPATIENT
Start: 2018-07-11 | End: 2018-10-17 | Stop reason: SDUPTHER

## 2018-07-11 RX ORDER — TRAMADOL HYDROCHLORIDE 50 MG/1
50 TABLET ORAL EVERY 6 HOURS PRN
Qty: 40 TABLET | Refills: 0 | Status: SHIPPED | OUTPATIENT
Start: 2018-07-11 | End: 2018-08-29 | Stop reason: SDUPTHER

## 2018-07-12 ENCOUNTER — PATIENT MESSAGE (OUTPATIENT)
Dept: INTERNAL MEDICINE | Facility: CLINIC | Age: 53
End: 2018-07-12

## 2018-08-30 ENCOUNTER — PATIENT MESSAGE (OUTPATIENT)
Dept: INTERNAL MEDICINE | Facility: CLINIC | Age: 53
End: 2018-08-30

## 2018-08-30 RX ORDER — TRAMADOL HYDROCHLORIDE 50 MG/1
50 TABLET ORAL EVERY 6 HOURS PRN
Qty: 40 TABLET | Refills: 0 | Status: SHIPPED | OUTPATIENT
Start: 2018-08-30 | End: 2018-11-12 | Stop reason: SDUPTHER

## 2018-08-30 RX ORDER — TRAMADOL HYDROCHLORIDE 50 MG/1
TABLET ORAL
Qty: 40 TABLET | Refills: 0 | OUTPATIENT
Start: 2018-08-30

## 2018-10-17 DIAGNOSIS — G89.29 CHRONIC LOW BACK PAIN WITHOUT SCIATICA, UNSPECIFIED BACK PAIN LATERALITY: ICD-10-CM

## 2018-10-17 DIAGNOSIS — M54.50 CHRONIC LOW BACK PAIN WITHOUT SCIATICA, UNSPECIFIED BACK PAIN LATERALITY: ICD-10-CM

## 2018-10-20 RX ORDER — TRAMADOL HYDROCHLORIDE 50 MG/1
50 TABLET ORAL EVERY 6 HOURS PRN
Qty: 40 TABLET | Refills: 0 | OUTPATIENT
Start: 2018-10-20

## 2018-10-20 RX ORDER — TIZANIDINE 4 MG/1
4 TABLET ORAL 2 TIMES DAILY PRN
Qty: 60 TABLET | Refills: 2 | Status: SHIPPED | OUTPATIENT
Start: 2018-10-20 | End: 2019-04-26 | Stop reason: SDUPTHER

## 2018-11-12 ENCOUNTER — PATIENT MESSAGE (OUTPATIENT)
Dept: INTERNAL MEDICINE | Facility: CLINIC | Age: 53
End: 2018-11-12

## 2018-11-13 ENCOUNTER — PATIENT MESSAGE (OUTPATIENT)
Dept: GASTROENTEROLOGY | Facility: CLINIC | Age: 53
End: 2018-11-13

## 2018-11-13 ENCOUNTER — PATIENT MESSAGE (OUTPATIENT)
Dept: INTERNAL MEDICINE | Facility: CLINIC | Age: 53
End: 2018-11-13

## 2018-11-13 RX ORDER — TRAMADOL HYDROCHLORIDE 50 MG/1
50 TABLET ORAL EVERY 6 HOURS PRN
Qty: 30 TABLET | Refills: 0 | Status: SHIPPED | OUTPATIENT
Start: 2018-11-13 | End: 2019-01-11 | Stop reason: SDUPTHER

## 2019-01-11 ENCOUNTER — OFFICE VISIT (OUTPATIENT)
Dept: INTERNAL MEDICINE | Facility: CLINIC | Age: 54
End: 2019-01-11
Payer: COMMERCIAL

## 2019-01-11 VITALS
WEIGHT: 237.88 LBS | DIASTOLIC BLOOD PRESSURE: 88 MMHG | TEMPERATURE: 97 F | SYSTOLIC BLOOD PRESSURE: 126 MMHG | HEART RATE: 76 BPM | HEIGHT: 74 IN | BODY MASS INDEX: 30.53 KG/M2 | OXYGEN SATURATION: 98 %

## 2019-01-11 DIAGNOSIS — Z12.11 COLON CANCER SCREENING: ICD-10-CM

## 2019-01-11 DIAGNOSIS — G89.29 CHRONIC LOW BACK PAIN WITHOUT SCIATICA, UNSPECIFIED BACK PAIN LATERALITY: Primary | ICD-10-CM

## 2019-01-11 DIAGNOSIS — M54.50 CHRONIC LOW BACK PAIN WITHOUT SCIATICA, UNSPECIFIED BACK PAIN LATERALITY: Primary | ICD-10-CM

## 2019-01-11 PROCEDURE — 99999 PR PBB SHADOW E&M-EST. PATIENT-LVL IV: ICD-10-PCS | Mod: PBBFAC,,, | Performed by: FAMILY MEDICINE

## 2019-01-11 PROCEDURE — 99999 PR PBB SHADOW E&M-EST. PATIENT-LVL IV: CPT | Mod: PBBFAC,,, | Performed by: FAMILY MEDICINE

## 2019-01-11 PROCEDURE — 3008F PR BODY MASS INDEX (BMI) DOCUMENTED: ICD-10-PCS | Mod: CPTII,S$GLB,, | Performed by: FAMILY MEDICINE

## 2019-01-11 PROCEDURE — 99213 OFFICE O/P EST LOW 20 MIN: CPT | Mod: S$GLB,,, | Performed by: FAMILY MEDICINE

## 2019-01-11 PROCEDURE — 99213 PR OFFICE/OUTPT VISIT, EST, LEVL III, 20-29 MIN: ICD-10-PCS | Mod: S$GLB,,, | Performed by: FAMILY MEDICINE

## 2019-01-11 PROCEDURE — 3008F BODY MASS INDEX DOCD: CPT | Mod: CPTII,S$GLB,, | Performed by: FAMILY MEDICINE

## 2019-01-11 RX ORDER — TRAMADOL HYDROCHLORIDE 50 MG/1
50 TABLET ORAL EVERY 6 HOURS PRN
Qty: 40 TABLET | Refills: 1 | Status: SHIPPED | OUTPATIENT
Start: 2019-01-11 | End: 2019-03-25 | Stop reason: SDUPTHER

## 2019-01-11 RX ORDER — PHENTERMINE HYDROCHLORIDE 37.5 MG/1
37.5 TABLET ORAL
Qty: 30 TABLET | Refills: 0 | Status: SHIPPED | OUTPATIENT
Start: 2019-01-11 | End: 2019-02-10

## 2019-01-11 NOTE — PROGRESS NOTES
Subjective:       Patient ID: Dimas Darden is a 53 y.o. male.    Chief Complaint: Back Pain    HPI Mr. Darden presents today for follow up back pain. Last seen 12/2017  He gets his medications mixed up he is not sure which between the muscle relaxer and pain pill makes him sleepy. He thinks it is the pain pill and wants a refill on muscle relaxer.     Currently having low back pain. Feels like it is getting worse. Perhaps weight gain.   Works out, feels good and then he hurts back and goes in that cycle. He has also tried diet pills over the counter hydroxycut. Pill and powder.   Has home gym  Would like to lose weight.   Getting up from a chair could exacerbate his pain he can get a shooting pain and feels like a stretch.      Answers for HPI/ROS submitted by the patient on 1/10/2019   Back pain  genital pain: No    Diverticulitis- has been pretty stable. Last visit 6/27/2018    Review of Systems   Constitutional: Negative for fever.   Cardiovascular: Negative for chest pain.   Gastrointestinal: Negative for abdominal pain.   Genitourinary: Negative for dysuria and hematuria.   Musculoskeletal: Positive for back pain.   Neurological: Negative for weakness, numbness and headaches.         Past Medical History:   Diagnosis Date    Disc     Bulging disc in back    Diverticulitis      Past Surgical History:   Procedure Laterality Date    UNDESCENDED TESTICLE EXPLORATION Left     Age 15     History reviewed. No pertinent family history.  Social History     Socioeconomic History    Marital status:      Spouse name: None    Number of children: None    Years of education: None    Highest education level: None   Social Needs    Financial resource strain: None    Food insecurity - worry: None    Food insecurity - inability: None    Transportation needs - medical: None    Transportation needs - non-medical: None   Occupational History    None   Tobacco Use    Smoking status: Former Smoker      Packs/day: 1.00     Years: 25.00     Pack years: 25.00     Last attempt to quit: 4/10/2012     Years since quittin.7    Smokeless tobacco: Never Used   Substance and Sexual Activity    Alcohol use: Yes     Comment: socially    Drug use: No    Sexual activity: Yes     Partners: Female   Other Topics Concern    None   Social History Narrative    None       Objective:        Physical Exam   Constitutional: He is oriented to person, place, and time. He appears well-developed and well-nourished.   HENT:   Head: Normocephalic and atraumatic.   Cardiovascular: Normal rate, regular rhythm and normal heart sounds.   Pulmonary/Chest: Effort normal and breath sounds normal.   Musculoskeletal: He exhibits tenderness. He exhibits no edema.   Neurological: He is alert and oriented to person, place, and time.   Skin: Skin is warm.   Vitals reviewed.      Assessment/Plan:     Chronic low back pain without sciatica, unspecified back pain laterality  -     traMADol (ULTRAM) 50 mg tablet; Take 1 tablet (50 mg total) by mouth every 6 (six) hours as needed.  Dispense: 40 tablet; Refill: 1  Discussed weight loss he has tried OTC weight loss medication and would like to try something prescription.   Discussed exercise he usually over does it when he feels good we discussed not doing that.     BMI 30.0-30.9,adult  -     phentermine (ADIPEX-P) 37.5 mg tablet; Take 1 tablet (37.5 mg total) by mouth before breakfast.  Dispense: 30 tablet; Refill: 0    Colon cancer screening  -     Fecal Immunochemical Test (iFOBT); Future; Expected date: 2019    Follow-up in about 6 months (around 2019).    Angela Payne MD  [facility]   Family Medicine

## 2019-01-22 ENCOUNTER — PATIENT MESSAGE (OUTPATIENT)
Dept: INTERNAL MEDICINE | Facility: CLINIC | Age: 54
End: 2019-01-22

## 2019-01-25 ENCOUNTER — PATIENT MESSAGE (OUTPATIENT)
Dept: INTERNAL MEDICINE | Facility: CLINIC | Age: 54
End: 2019-01-25

## 2019-02-07 RX ORDER — PHENTERMINE HYDROCHLORIDE 37.5 MG/1
37.5 TABLET ORAL
Qty: 30 TABLET | Refills: 0 | OUTPATIENT
Start: 2019-02-07 | End: 2019-03-09

## 2019-02-11 ENCOUNTER — PATIENT MESSAGE (OUTPATIENT)
Dept: INTERNAL MEDICINE | Facility: CLINIC | Age: 54
End: 2019-02-11

## 2019-02-15 ENCOUNTER — OFFICE VISIT (OUTPATIENT)
Dept: INTERNAL MEDICINE | Facility: CLINIC | Age: 54
End: 2019-02-15
Payer: COMMERCIAL

## 2019-02-15 VITALS
WEIGHT: 228.63 LBS | OXYGEN SATURATION: 96 % | BODY MASS INDEX: 29.34 KG/M2 | HEART RATE: 78 BPM | SYSTOLIC BLOOD PRESSURE: 124 MMHG | HEIGHT: 74 IN | DIASTOLIC BLOOD PRESSURE: 94 MMHG

## 2019-02-15 DIAGNOSIS — G89.29 CHRONIC LOW BACK PAIN WITHOUT SCIATICA, UNSPECIFIED BACK PAIN LATERALITY: Primary | ICD-10-CM

## 2019-02-15 DIAGNOSIS — M54.50 CHRONIC LOW BACK PAIN WITHOUT SCIATICA, UNSPECIFIED BACK PAIN LATERALITY: Primary | ICD-10-CM

## 2019-02-15 PROCEDURE — 99213 OFFICE O/P EST LOW 20 MIN: CPT | Mod: S$GLB,,, | Performed by: FAMILY MEDICINE

## 2019-02-15 PROCEDURE — 3008F BODY MASS INDEX DOCD: CPT | Mod: CPTII,S$GLB,, | Performed by: FAMILY MEDICINE

## 2019-02-15 PROCEDURE — 99213 PR OFFICE/OUTPT VISIT, EST, LEVL III, 20-29 MIN: ICD-10-PCS | Mod: S$GLB,,, | Performed by: FAMILY MEDICINE

## 2019-02-15 PROCEDURE — 3008F PR BODY MASS INDEX (BMI) DOCUMENTED: ICD-10-PCS | Mod: CPTII,S$GLB,, | Performed by: FAMILY MEDICINE

## 2019-02-15 PROCEDURE — 99999 PR PBB SHADOW E&M-EST. PATIENT-LVL III: ICD-10-PCS | Mod: PBBFAC,,, | Performed by: FAMILY MEDICINE

## 2019-02-15 PROCEDURE — 99999 PR PBB SHADOW E&M-EST. PATIENT-LVL III: CPT | Mod: PBBFAC,,, | Performed by: FAMILY MEDICINE

## 2019-02-15 RX ORDER — PHENTERMINE HYDROCHLORIDE 37.5 MG/1
TABLET ORAL
COMMUNITY
Start: 2019-01-11 | End: 2019-02-15 | Stop reason: SDUPTHER

## 2019-02-15 RX ORDER — PHENTERMINE HYDROCHLORIDE 37.5 MG/1
37.5 TABLET ORAL DAILY
Qty: 30 TABLET | Refills: 0 | Status: SHIPPED | OUTPATIENT
Start: 2019-02-15 | End: 2019-04-25 | Stop reason: SDUPTHER

## 2019-02-15 NOTE — PROGRESS NOTES
Dimas Davis Darden  53 y.o. White male    Here for follow up on back pain and weight loss    Lost 9 lbs   Works out some but is trying not to over do it.   Took 1/2 tablet and did fine.   Taking a whole pill he wouldn't eat all day.     Reports taking medications as instructed.  Not taking any OTC meds.    Past Medical History:   Diagnosis Date    Disc     Bulging disc in back    Diverticulitis        Current Outpatient Medications:     phentermine (ADIPEX-P) 37.5 mg tablet, Take 1 tablet (37.5 mg total) by mouth once daily., Disp: 30 tablet, Rfl: 0    tiZANidine (ZANAFLEX) 4 MG tablet, Take 1 tablet (4 mg total) by mouth 2 (two) times daily as needed., Disp: 60 tablet, Rfl: 2    traMADol (ULTRAM) 50 mg tablet, Take 1 tablet (50 mg total) by mouth every 6 (six) hours as needed., Disp: 40 tablet, Rfl: 1    Review of patient's allergies indicates:  No Known Allergies    ROS: Denies dizziness, headache, chest pain, shortness of breath or edema    PE:  GEN: Alert and oriented, no acute distress  HEART: Normal S1 and S2, RRR, no lower extremity edema  LUNGS: Respirations unlabored, bilaterally clear to auscultation    ASSESSMENT/PLAN:  Chronic low back pain without sciatica, unspecified back pain laterality  Symptoms getting better with weight loss. He would like to lose 10 more lbs.   Continue diet and exercise    BMI 29.0-29.9,adult  -     phentermine (ADIPEX-P) 37.5 mg tablet; Take 1 tablet (37.5 mg total) by mouth once daily.  Dispense: 30 tablet; Refill: 0    Continue diet and exercise    Follow up 4 weeks NV if not getting another refill.

## 2019-03-25 DIAGNOSIS — G89.29 CHRONIC LOW BACK PAIN WITHOUT SCIATICA, UNSPECIFIED BACK PAIN LATERALITY: ICD-10-CM

## 2019-03-25 DIAGNOSIS — M54.50 CHRONIC LOW BACK PAIN WITHOUT SCIATICA, UNSPECIFIED BACK PAIN LATERALITY: ICD-10-CM

## 2019-03-25 RX ORDER — TRAMADOL HYDROCHLORIDE 50 MG/1
50 TABLET ORAL EVERY 6 HOURS PRN
Qty: 40 TABLET | Refills: 1 | Status: SHIPPED | OUTPATIENT
Start: 2019-03-25 | End: 2019-04-25 | Stop reason: SDUPTHER

## 2019-04-11 ENCOUNTER — PATIENT MESSAGE (OUTPATIENT)
Dept: INTERNAL MEDICINE | Facility: CLINIC | Age: 54
End: 2019-04-11

## 2019-04-11 DIAGNOSIS — Z87.19 HISTORY OF DIVERTICULITIS: ICD-10-CM

## 2019-04-13 RX ORDER — METRONIDAZOLE 500 MG/1
TABLET ORAL
Qty: 30 TABLET | Refills: 0 | OUTPATIENT
Start: 2019-04-13

## 2019-04-13 RX ORDER — CIPROFLOXACIN 500 MG/1
TABLET ORAL
Qty: 24 TABLET | Refills: 0 | OUTPATIENT
Start: 2019-04-13

## 2019-04-26 ENCOUNTER — OFFICE VISIT (OUTPATIENT)
Dept: INTERNAL MEDICINE | Facility: CLINIC | Age: 54
End: 2019-04-26
Payer: COMMERCIAL

## 2019-04-26 VITALS
SYSTOLIC BLOOD PRESSURE: 126 MMHG | TEMPERATURE: 98 F | HEIGHT: 74 IN | BODY MASS INDEX: 28.94 KG/M2 | OXYGEN SATURATION: 94 % | HEART RATE: 93 BPM | DIASTOLIC BLOOD PRESSURE: 84 MMHG | WEIGHT: 225.5 LBS

## 2019-04-26 DIAGNOSIS — G89.29 CHRONIC LOW BACK PAIN WITHOUT SCIATICA, UNSPECIFIED BACK PAIN LATERALITY: Primary | ICD-10-CM

## 2019-04-26 DIAGNOSIS — M54.50 CHRONIC LOW BACK PAIN WITHOUT SCIATICA, UNSPECIFIED BACK PAIN LATERALITY: Primary | ICD-10-CM

## 2019-04-26 DIAGNOSIS — K57.92 DIVERTICULITIS: ICD-10-CM

## 2019-04-26 PROCEDURE — 99214 OFFICE O/P EST MOD 30 MIN: CPT | Mod: S$GLB,,, | Performed by: FAMILY MEDICINE

## 2019-04-26 PROCEDURE — 3008F BODY MASS INDEX DOCD: CPT | Mod: CPTII,S$GLB,, | Performed by: FAMILY MEDICINE

## 2019-04-26 PROCEDURE — 99999 PR PBB SHADOW E&M-EST. PATIENT-LVL III: CPT | Mod: PBBFAC,,, | Performed by: FAMILY MEDICINE

## 2019-04-26 PROCEDURE — 3008F PR BODY MASS INDEX (BMI) DOCUMENTED: ICD-10-PCS | Mod: CPTII,S$GLB,, | Performed by: FAMILY MEDICINE

## 2019-04-26 PROCEDURE — 99999 PR PBB SHADOW E&M-EST. PATIENT-LVL III: ICD-10-PCS | Mod: PBBFAC,,, | Performed by: FAMILY MEDICINE

## 2019-04-26 PROCEDURE — 99214 PR OFFICE/OUTPT VISIT, EST, LEVL IV, 30-39 MIN: ICD-10-PCS | Mod: S$GLB,,, | Performed by: FAMILY MEDICINE

## 2019-04-26 RX ORDER — PHENTERMINE HYDROCHLORIDE 37.5 MG/1
37.5 TABLET ORAL DAILY
Qty: 30 TABLET | Refills: 0 | Status: SHIPPED | OUTPATIENT
Start: 2019-04-26 | End: 2020-01-24

## 2019-04-26 RX ORDER — METRONIDAZOLE 500 MG/1
500 TABLET ORAL EVERY 12 HOURS
Qty: 20 TABLET | Refills: 0 | Status: SHIPPED | OUTPATIENT
Start: 2019-04-26 | End: 2019-10-11 | Stop reason: SDUPTHER

## 2019-04-26 RX ORDER — TIZANIDINE 4 MG/1
4 TABLET ORAL 2 TIMES DAILY PRN
Qty: 60 TABLET | Refills: 2 | Status: SHIPPED | OUTPATIENT
Start: 2019-04-26 | End: 2019-10-11 | Stop reason: SDUPTHER

## 2019-04-26 RX ORDER — CIPROFLOXACIN 500 MG/1
500 TABLET ORAL EVERY 12 HOURS
Qty: 20 TABLET | Refills: 0 | Status: SHIPPED | OUTPATIENT
Start: 2019-04-26 | End: 2019-10-11 | Stop reason: SDUPTHER

## 2019-04-26 RX ORDER — TRAMADOL HYDROCHLORIDE 50 MG/1
50 TABLET ORAL EVERY 6 HOURS PRN
Qty: 40 TABLET | Refills: 1 | Status: SHIPPED | OUTPATIENT
Start: 2019-04-26 | End: 2019-10-11 | Stop reason: SDUPTHER

## 2019-04-26 NOTE — PROGRESS NOTES
Subjective:       Patient ID: Dimas Darden is a 53 y.o. male.    Chief Complaint: Medication Refill    HPI Mr. Darden presents today for medication refill. He has a few complaints today as to why he needs medications refilled. ( back pain, diverticulitis, weight)    Back pain and Weight  He has been working on weight loss for his back. He has not had to use Tramadol often especially since he has lost about 10 lbs. Would like refill on Tramadol to have for when he needs it. Would like one more refill on adipex as this has helped with weight loss. He does exercise but works hard not to over do it considering his back.     Diverticulitis  He has had trouble with this for about two years now.   He was admitted in the hospital with a flair 12/2016 then back in the ED 3/20/2017. Surgery was discussed per patient he does not want to do this if not totally necessary. He has tried avoiding a procedure. He has had a few flares since that time where antibiotics help especially if he catches it and takes them early when his symptoms start.  Recently ate something that flared his diverticulitis. Left sided abdominal pain that refers to the left groin.   He is not in excruciating pain today but would like to not get to that point. He knows to avoid certain things but recently pushed the limit a little.     Review of Systems   Constitutional: Negative for activity change and unexpected weight change.   HENT: Negative for hearing loss, rhinorrhea and trouble swallowing.    Eyes: Negative for discharge and visual disturbance.   Respiratory: Negative for chest tightness and wheezing.    Cardiovascular: Negative for chest pain and palpitations.   Gastrointestinal: Negative for blood in stool, constipation, diarrhea and vomiting.   Endocrine: Negative for polydipsia and polyuria.   Genitourinary: Negative for difficulty urinating, hematuria and urgency.   Musculoskeletal: Negative for arthralgias, joint swelling and neck pain.    Neurological: Negative for weakness and headaches.   Psychiatric/Behavioral: Negative for confusion and dysphoric mood.           Past Medical History:   Diagnosis Date    Disc     Bulging disc in back    Diverticulitis      Past Surgical History:   Procedure Laterality Date    UNDESCENDED TESTICLE EXPLORATION Left     Age 15     Objective:        Physical Exam   Constitutional: He is oriented to person, place, and time. He appears well-developed and well-nourished.   HENT:   Head: Normocephalic and atraumatic.   Cardiovascular: Normal rate, regular rhythm and normal heart sounds.   Pulmonary/Chest: Breath sounds normal.   Abdominal: Soft. Bowel sounds are normal. He exhibits no distension. Tenderness: discomfort LLQ.   Musculoskeletal: He exhibits no edema or tenderness.   Neurological: He is alert and oriented to person, place, and time.   Skin: Skin is warm.   Vitals reviewed.        Results for orders placed or performed in visit on 04/06/17   Comprehensive metabolic panel   Result Value Ref Range    Sodium 140 136 - 145 mmol/L    Potassium 4.8 3.5 - 5.1 mmol/L    Chloride 103 95 - 110 mmol/L    CO2 27 23 - 29 mmol/L    Glucose 100 70 - 110 mg/dL    BUN, Bld 10 6 - 20 mg/dL    Creatinine 1.0 0.5 - 1.4 mg/dL    Calcium 9.5 8.7 - 10.5 mg/dL    Total Protein 7.3 6.0 - 8.4 g/dL    Albumin 3.8 3.5 - 5.2 g/dL    Total Bilirubin 0.7 0.1 - 1.0 mg/dL    Alkaline Phosphatase 54 (L) 55 - 135 U/L    AST 18 10 - 40 U/L    ALT 27 10 - 44 U/L    Anion Gap 10 8 - 16 mmol/L    eGFR if African American >60 >60 mL/min/1.73 m^2    eGFR if non African American >60 >60 mL/min/1.73 m^2   CBC auto differential   Result Value Ref Range    WBC 13.87 (H) 3.90 - 12.70 K/uL    RBC 4.98 4.60 - 6.20 M/uL    Hemoglobin 15.1 14.0 - 18.0 g/dL    Hematocrit 43.9 40.0 - 54.0 %    MCV 88 82 - 98 fL    MCH 30.3 27.0 - 31.0 pg    MCHC 34.4 32.0 - 36.0 %    RDW 12.7 11.5 - 14.5 %    Platelets 226 150 - 350 K/uL    MPV 10.2 9.2 - 12.9 fL    Gran #  (ANC) 10.3 (H) 1.8 - 7.7 K/uL    Lymph # 2.1 1.0 - 4.8 K/uL    Mono # 1.4 (H) 0.3 - 1.0 K/uL    Eos # 0.1 0.0 - 0.5 K/uL    Baso # 0.04 0.00 - 0.20 K/uL    Gran% 74.1 (H) 38.0 - 73.0 %    Lymph% 14.9 (L) 18.0 - 48.0 %    Mono% 9.8 4.0 - 15.0 %    Eosinophil% 0.9 0.0 - 8.0 %    Basophil% 0.3 0.0 - 1.9 %    Differential Method Automated        Assessment/Plan:     Chronic low back pain without sciatica, unspecified back pain laterality  -     traMADol (ULTRAM) 50 mg tablet; Take 1 tablet (50 mg total) by mouth every 6 (six) hours as needed.  Dispense: 40 tablet; Refill: 1  -     tiZANidine (ZANAFLEX) 4 MG tablet; Take 1 tablet (4 mg total) by mouth 2 (two) times daily as needed.  Dispense: 60 tablet; Refill: 2    BMI 29.0-29.9,adult  -     phentermine (ADIPEX-P) 37.5 mg tablet; Take 1 tablet (37.5 mg total) by mouth once daily.  Dispense: 30 tablet; Refill: 0    Diverticulitis  -     ciprofloxacin HCl (CIPRO) 500 MG tablet; Take 1 tablet (500 mg total) by mouth every 12 (twelve) hours.  Dispense: 20 tablet; Refill: 0  -     metroNIDAZOLE (FLAGYL) 500 MG tablet; Take 1 tablet (500 mg total) by mouth every 12 (twelve) hours.  Dispense: 20 tablet; Refill: 0    Refilled medication. Patient is fully aware of the interaction of Ciprofloxacin and Zanaflex. He does not have to that the Zanaflex all the time and does not take it when he is taking ciprofloxacin.     Continue exercise and diet changes    Back pain as continued to improve with less flares/exacerbations.     Follow up in about 1 month (around 5/24/2019), or if symptoms worsen or fail to improve.    Angela Payne MD  [facility]   Family Medicine

## 2019-09-06 ENCOUNTER — PATIENT OUTREACH (OUTPATIENT)
Dept: ADMINISTRATIVE | Facility: HOSPITAL | Age: 54
End: 2019-09-06

## 2019-10-04 ENCOUNTER — TELEPHONE (OUTPATIENT)
Dept: INTERNAL MEDICINE | Facility: CLINIC | Age: 54
End: 2019-10-04

## 2019-10-10 ENCOUNTER — PATIENT OUTREACH (OUTPATIENT)
Dept: ADMINISTRATIVE | Facility: HOSPITAL | Age: 54
End: 2019-10-10

## 2019-10-10 NOTE — PROGRESS NOTES
Contacted patient to follow up on overdue fit kit. Patient states he hasn't completed the kit and doesn't know when he will

## 2019-10-11 ENCOUNTER — TELEPHONE (OUTPATIENT)
Dept: INTERNAL MEDICINE | Facility: CLINIC | Age: 54
End: 2019-10-11

## 2019-10-11 ENCOUNTER — OFFICE VISIT (OUTPATIENT)
Dept: INTERNAL MEDICINE | Facility: CLINIC | Age: 54
End: 2019-10-11
Payer: COMMERCIAL

## 2019-10-11 VITALS
TEMPERATURE: 97 F | HEIGHT: 74 IN | BODY MASS INDEX: 28.52 KG/M2 | DIASTOLIC BLOOD PRESSURE: 98 MMHG | SYSTOLIC BLOOD PRESSURE: 148 MMHG | WEIGHT: 222.25 LBS | OXYGEN SATURATION: 97 % | HEART RATE: 62 BPM

## 2019-10-11 DIAGNOSIS — Z79.899 MEDICATION MANAGEMENT: ICD-10-CM

## 2019-10-11 DIAGNOSIS — K57.92 DIVERTICULITIS: ICD-10-CM

## 2019-10-11 DIAGNOSIS — M54.50 CHRONIC LOW BACK PAIN WITHOUT SCIATICA, UNSPECIFIED BACK PAIN LATERALITY: ICD-10-CM

## 2019-10-11 DIAGNOSIS — G89.29 CHRONIC LOW BACK PAIN WITHOUT SCIATICA, UNSPECIFIED BACK PAIN LATERALITY: ICD-10-CM

## 2019-10-11 DIAGNOSIS — Z00.00 ROUTINE PHYSICAL EXAMINATION: Primary | ICD-10-CM

## 2019-10-11 PROCEDURE — 99999 PR PBB SHADOW E&M-EST. PATIENT-LVL III: CPT | Mod: PBBFAC,,, | Performed by: FAMILY MEDICINE

## 2019-10-11 PROCEDURE — 99396 PR PREVENTIVE VISIT,EST,40-64: ICD-10-PCS | Mod: S$GLB,,, | Performed by: FAMILY MEDICINE

## 2019-10-11 PROCEDURE — 99396 PREV VISIT EST AGE 40-64: CPT | Mod: S$GLB,,, | Performed by: FAMILY MEDICINE

## 2019-10-11 PROCEDURE — 99999 PR PBB SHADOW E&M-EST. PATIENT-LVL III: ICD-10-PCS | Mod: PBBFAC,,, | Performed by: FAMILY MEDICINE

## 2019-10-11 RX ORDER — TRAMADOL HYDROCHLORIDE 50 MG/1
50 TABLET ORAL EVERY 4 HOURS PRN
Qty: 40 TABLET | Refills: 1 | Status: SHIPPED | OUTPATIENT
Start: 2019-10-11 | End: 2019-12-23 | Stop reason: SDUPTHER

## 2019-10-11 RX ORDER — CIPROFLOXACIN 500 MG/1
500 TABLET ORAL EVERY 12 HOURS
Qty: 20 TABLET | Refills: 0 | Status: ON HOLD | OUTPATIENT
Start: 2019-10-11 | End: 2020-01-25 | Stop reason: HOSPADM

## 2019-10-11 RX ORDER — METRONIDAZOLE 500 MG/1
500 TABLET ORAL EVERY 12 HOURS
Qty: 20 TABLET | Refills: 0 | Status: SHIPPED | OUTPATIENT
Start: 2019-10-11 | End: 2020-01-24

## 2019-10-11 RX ORDER — TIZANIDINE 4 MG/1
4 TABLET ORAL 2 TIMES DAILY PRN
Qty: 60 TABLET | Refills: 2 | Status: ON HOLD | OUTPATIENT
Start: 2019-10-11 | End: 2020-01-25 | Stop reason: HOSPADM

## 2019-10-11 NOTE — PROGRESS NOTES
Subjective:       Patient ID: Dimas Darden is a 53 y.o. male.    Chief Complaint: Medication Refill    HPI Mr. Darden presents today for routine exam and medication refill. He has chronic low back pain that he uses Tramadol for. He does not have to use it often. He has lost weight which has helped with decreasing pain.     He has recurrent flares of diverticulitis.   May review medical history in chart and hospitalizations.Surgery has been recommended in the past but patient did not agree.     He would like a refill on antibiotics.   He does not use zanaflex the same time he is using ciprofloxacin as it has an interaction and contraindication to use together.     Review of Systems   Constitutional: Negative for activity change and unexpected weight change.   HENT: Negative for hearing loss, rhinorrhea and trouble swallowing.    Eyes: Negative for discharge and visual disturbance.   Respiratory: Negative for chest tightness and wheezing.    Cardiovascular: Negative for chest pain and palpitations.   Gastrointestinal: Negative for blood in stool, constipation, diarrhea and vomiting.   Endocrine: Negative for polydipsia and polyuria.   Genitourinary: Negative for difficulty urinating, hematuria and urgency.   Musculoskeletal: Negative for arthralgias, joint swelling and neck pain.   Neurological: Negative for weakness and headaches.   Psychiatric/Behavioral: Negative for confusion and dysphoric mood.           Past Medical History:   Diagnosis Date    Disc     Bulging disc in back    Diverticulitis      Past Surgical History:   Procedure Laterality Date    UNDESCENDED TESTICLE EXPLORATION Left     Age 15     History reviewed. No pertinent family history.  Social History     Socioeconomic History    Marital status:      Spouse name: Not on file    Number of children: Not on file    Years of education: Not on file    Highest education level: Not on file   Occupational History    Not on file   Social  Needs    Financial resource strain: Somewhat hard    Food insecurity:     Worry: Never true     Inability: Never true    Transportation needs:     Medical: No     Non-medical: No   Tobacco Use    Smoking status: Former Smoker     Packs/day: 1.00     Years: 25.00     Pack years: 25.00     Last attempt to quit: 4/10/2012     Years since quittin.5    Smokeless tobacco: Never Used   Substance and Sexual Activity    Alcohol use: Yes     Comment: socially    Drug use: No    Sexual activity: Yes     Partners: Female   Lifestyle    Physical activity:     Days per week: 3 days     Minutes per session: 30 min    Stress: Not at all   Relationships    Social connections:     Talks on phone: More than three times a week     Gets together: More than three times a week     Attends [employer] service: Not on file     Active member of club or organization: No     Attends meetings of clubs or organizations: Never     Relationship status:    Other Topics Concern    Not on file   Social History Narrative    Not on file       Objective:        Physical Exam   Constitutional: He is oriented to person, place, and time. He appears well-developed and well-nourished. He does not appear ill.   HENT:   Head: Normocephalic and atraumatic.   Right Ear: External ear normal.   Left Ear: External ear normal.   Eyes: Pupils are equal, round, and reactive to light. EOM are normal. Right eye exhibits no discharge. Left eye exhibits no discharge. No scleral icterus.   Neck: Normal range of motion. Neck supple. No thyromegaly present.   Cardiovascular: Normal rate, regular rhythm and normal heart sounds.   No murmur heard.  Pulmonary/Chest: Effort normal and breath sounds normal. No respiratory distress. He has no wheezes.   Abdominal: Soft. Bowel sounds are normal. He exhibits no distension. There is no tenderness.   Musculoskeletal: Normal range of motion. He exhibits no edema.   Neurological: He is alert and oriented to person,  place, and time. He has normal reflexes. Coordination normal.   Skin: Skin is warm. No rash noted. No erythema.   Psychiatric: He has a normal mood and affect. His behavior is normal.   Nursing note and vitals reviewed.        Results for orders placed or performed in visit on 04/06/17   Comprehensive metabolic panel   Result Value Ref Range    Sodium 140 136 - 145 mmol/L    Potassium 4.8 3.5 - 5.1 mmol/L    Chloride 103 95 - 110 mmol/L    CO2 27 23 - 29 mmol/L    Glucose 100 70 - 110 mg/dL    BUN, Bld 10 6 - 20 mg/dL    Creatinine 1.0 0.5 - 1.4 mg/dL    Calcium 9.5 8.7 - 10.5 mg/dL    Total Protein 7.3 6.0 - 8.4 g/dL    Albumin 3.8 3.5 - 5.2 g/dL    Total Bilirubin 0.7 0.1 - 1.0 mg/dL    Alkaline Phosphatase 54 (L) 55 - 135 U/L    AST 18 10 - 40 U/L    ALT 27 10 - 44 U/L    Anion Gap 10 8 - 16 mmol/L    eGFR if African American >60 >60 mL/min/1.73 m^2    eGFR if non African American >60 >60 mL/min/1.73 m^2   CBC auto differential   Result Value Ref Range    WBC 13.87 (H) 3.90 - 12.70 K/uL    RBC 4.98 4.60 - 6.20 M/uL    Hemoglobin 15.1 14.0 - 18.0 g/dL    Hematocrit 43.9 40.0 - 54.0 %    Mean Corpuscular Volume 88 82 - 98 fL    Mean Corpuscular Hemoglobin 30.3 27.0 - 31.0 pg    Mean Corpuscular Hemoglobin Conc 34.4 32.0 - 36.0 %    RDW 12.7 11.5 - 14.5 %    Platelets 226 150 - 350 K/uL    MPV 10.2 9.2 - 12.9 fL    Gran # (ANC) 10.3 (H) 1.8 - 7.7 K/uL    Lymph # 2.1 1.0 - 4.8 K/uL    Mono # 1.4 (H) 0.3 - 1.0 K/uL    Eos # 0.1 0.0 - 0.5 K/uL    Baso # 0.04 0.00 - 0.20 K/uL    Gran% 74.1 (H) 38.0 - 73.0 %    Lymph% 14.9 (L) 18.0 - 48.0 %    Mono% 9.8 4.0 - 15.0 %    Eosinophil% 0.9 0.0 - 8.0 %    Basophil% 0.3 0.0 - 1.9 %    Differential Method Automated        Assessment/Plan:     Routine physical examination  -     CBC auto differential; Future; Expected date: 10/11/2019  -     Comprehensive metabolic panel; Future; Expected date: 10/11/2019  Reviewed medical, social, surgical and family history. Reviewed health  maintenance.   Addressed chronic issues today     Chronic low back pain without sciatica, unspecified back pain laterality  -     traMADol (ULTRAM) 50 mg tablet; Take 1 tablet (50 mg total) by mouth every 4 (four) hours as needed.  Dispense: 40 tablet; Refill: 1  -     tiZANidine (ZANAFLEX) 4 MG tablet; Take 1 tablet (4 mg total) by mouth 2 (two) times daily as needed.  Dispense: 60 tablet; Refill: 2    Diverticulitis  -     ciprofloxacin HCl (CIPRO) 500 MG tablet; Take 1 tablet (500 mg total) by mouth every 12 (twelve) hours.  Dispense: 20 tablet; Refill: 0  -     metroNIDAZOLE (FLAGYL) 500 MG tablet; Take 1 tablet (500 mg total) by mouth every 12 (twelve) hours.  Dispense: 20 tablet; Refill: 0  -     CBC auto differential; Future; Expected date: 10/11/2019    Medication management  -     Comprehensive metabolic panel; Future; Expected date: 10/11/2019        Follow up 4-6 months     Angela Payne MD  [facility]   Family Medicine

## 2019-10-11 NOTE — TELEPHONE ENCOUNTER
----- Message from Kortney Singh sent at 10/11/2019  3:11 PM CDT -----  Contact: [facility]  .Type:  Pharmacy Calling to Clarify an RX    Name of Caller:[facility]  Pharmacy Name:  [facility] [address] [address]  Phone: [phone] Fax: [phone]  Prescription Name:traMADol (ULTRAM) 50 mg tablet  What do they need to clarify?:DIRECTIONS  Best Call Back Number:[phone]  Additional Information:

## 2019-10-24 ENCOUNTER — LAB VISIT (OUTPATIENT)
Dept: LAB | Facility: HOSPITAL | Age: 54
End: 2019-10-24
Attending: FAMILY MEDICINE
Payer: COMMERCIAL

## 2019-10-24 DIAGNOSIS — K57.92 DIVERTICULITIS: ICD-10-CM

## 2019-10-24 DIAGNOSIS — Z00.00 ROUTINE PHYSICAL EXAMINATION: ICD-10-CM

## 2019-10-24 DIAGNOSIS — Z79.899 MEDICATION MANAGEMENT: ICD-10-CM

## 2019-10-24 LAB
ALBUMIN SERPL BCP-MCNC: 4.2 G/DL (ref 3.5–5.2)
ALP SERPL-CCNC: 45 U/L (ref 55–135)
ALT SERPL W/O P-5'-P-CCNC: 22 U/L (ref 10–44)
ANION GAP SERPL CALC-SCNC: 9 MMOL/L (ref 8–16)
AST SERPL-CCNC: 18 U/L (ref 10–40)
BASOPHILS # BLD AUTO: 0.04 K/UL (ref 0–0.2)
BASOPHILS NFR BLD: 0.5 % (ref 0–1.9)
BILIRUB SERPL-MCNC: 0.8 MG/DL (ref 0.1–1)
BUN SERPL-MCNC: 22 MG/DL (ref 6–20)
CALCIUM SERPL-MCNC: 9.8 MG/DL (ref 8.7–10.5)
CHLORIDE SERPL-SCNC: 101 MMOL/L (ref 95–110)
CO2 SERPL-SCNC: 30 MMOL/L (ref 23–29)
CREAT SERPL-MCNC: 0.9 MG/DL (ref 0.5–1.4)
DIFFERENTIAL METHOD: NORMAL
EOSINOPHIL # BLD AUTO: 0.2 K/UL (ref 0–0.5)
EOSINOPHIL NFR BLD: 2.1 % (ref 0–8)
ERYTHROCYTE [DISTWIDTH] IN BLOOD BY AUTOMATED COUNT: 11.9 % (ref 11.5–14.5)
EST. GFR  (AFRICAN AMERICAN): >60 ML/MIN/1.73 M^2
EST. GFR  (NON AFRICAN AMERICAN): >60 ML/MIN/1.73 M^2
GLUCOSE SERPL-MCNC: 98 MG/DL (ref 70–110)
HCT VFR BLD AUTO: 47 % (ref 40–54)
HGB BLD-MCNC: 15.1 G/DL (ref 14–18)
IMM GRANULOCYTES # BLD AUTO: 0.03 K/UL (ref 0–0.04)
IMM GRANULOCYTES NFR BLD AUTO: 0.4 % (ref 0–0.5)
LYMPHOCYTES # BLD AUTO: 2.4 K/UL (ref 1–4.8)
LYMPHOCYTES NFR BLD: 28.7 % (ref 18–48)
MCH RBC QN AUTO: 29.5 PG (ref 27–31)
MCHC RBC AUTO-ENTMCNC: 32.1 G/DL (ref 32–36)
MCV RBC AUTO: 92 FL (ref 82–98)
MONOCYTES # BLD AUTO: 0.7 K/UL (ref 0.3–1)
MONOCYTES NFR BLD: 8.1 % (ref 4–15)
NEUTROPHILS # BLD AUTO: 5.1 K/UL (ref 1.8–7.7)
NEUTROPHILS NFR BLD: 60.2 % (ref 38–73)
NRBC BLD-RTO: 0 /100 WBC
PLATELET # BLD AUTO: 218 K/UL (ref 150–350)
PMV BLD AUTO: 11.6 FL (ref 9.2–12.9)
POTASSIUM SERPL-SCNC: 3.9 MMOL/L (ref 3.5–5.1)
PROT SERPL-MCNC: 7.2 G/DL (ref 6–8.4)
RBC # BLD AUTO: 5.12 M/UL (ref 4.6–6.2)
SODIUM SERPL-SCNC: 140 MMOL/L (ref 136–145)
WBC # BLD AUTO: 8.4 K/UL (ref 3.9–12.7)

## 2019-10-24 PROCEDURE — 36415 COLL VENOUS BLD VENIPUNCTURE: CPT

## 2019-10-24 PROCEDURE — 85025 COMPLETE CBC W/AUTO DIFF WBC: CPT

## 2019-10-24 PROCEDURE — 80053 COMPREHEN METABOLIC PANEL: CPT

## 2019-12-23 DIAGNOSIS — G89.29 CHRONIC LOW BACK PAIN WITHOUT SCIATICA, UNSPECIFIED BACK PAIN LATERALITY: ICD-10-CM

## 2019-12-23 DIAGNOSIS — M54.50 CHRONIC LOW BACK PAIN WITHOUT SCIATICA, UNSPECIFIED BACK PAIN LATERALITY: ICD-10-CM

## 2019-12-23 RX ORDER — TRAMADOL HYDROCHLORIDE 50 MG/1
50 TABLET ORAL EVERY 4 HOURS PRN
Qty: 40 TABLET | Refills: 1 | Status: SHIPPED | OUTPATIENT
Start: 2019-12-23 | End: 2020-01-30 | Stop reason: SDUPTHER

## 2020-01-24 ENCOUNTER — HOSPITAL ENCOUNTER (OUTPATIENT)
Dept: RADIOLOGY | Facility: HOSPITAL | Age: 55
Discharge: HOME | DRG: 392 | End: 2020-01-24
Attending: PHYSICIAN ASSISTANT
Payer: COMMERCIAL

## 2020-01-24 ENCOUNTER — HOSPITAL ENCOUNTER (OUTPATIENT)
Facility: HOSPITAL | Age: 55
Discharge: HOME | DRG: 392 | End: 2020-01-25
Attending: EMERGENCY MEDICINE | Admitting: INTERNAL MEDICINE
Payer: COMMERCIAL

## 2020-01-24 ENCOUNTER — PATIENT MESSAGE (OUTPATIENT)
Dept: ADMINISTRATIVE | Facility: OTHER | Age: 55
End: 2020-01-24

## 2020-01-24 ENCOUNTER — OFFICE VISIT (OUTPATIENT)
Dept: INTERNAL MEDICINE | Facility: CLINIC | Age: 55
End: 2020-01-24
Payer: COMMERCIAL

## 2020-01-24 VITALS
WEIGHT: 223.75 LBS | HEIGHT: 74 IN | TEMPERATURE: 99 F | HEART RATE: 86 BPM | OXYGEN SATURATION: 97 % | BODY MASS INDEX: 28.71 KG/M2 | DIASTOLIC BLOOD PRESSURE: 74 MMHG | SYSTOLIC BLOOD PRESSURE: 114 MMHG

## 2020-01-24 DIAGNOSIS — R10.32 LLQ ABDOMINAL PAIN: ICD-10-CM

## 2020-01-24 DIAGNOSIS — R10.32 LEFT LOWER QUADRANT ABDOMINAL PAIN: ICD-10-CM

## 2020-01-24 DIAGNOSIS — K57.20 DIVERTICULITIS OF LARGE INTESTINE WITH PERFORATION WITHOUT ABSCESS OR BLEEDING: Primary | ICD-10-CM

## 2020-01-24 DIAGNOSIS — R10.32 LLQ ABDOMINAL PAIN: Primary | ICD-10-CM

## 2020-01-24 DIAGNOSIS — K57.20 DIVERTICULITIS OF LARGE INTESTINE WITH PERFORATION, UNSPECIFIED BLEEDING STATUS: ICD-10-CM

## 2020-01-24 PROCEDURE — 99285 EMERGENCY DEPT VISIT HI MDM: CPT | Mod: 25

## 2020-01-24 PROCEDURE — 25000003 PHARM REV CODE 250: Performed by: NURSE PRACTITIONER

## 2020-01-24 PROCEDURE — G0378 HOSPITAL OBSERVATION PER HR: HCPCS

## 2020-01-24 PROCEDURE — 74178 CT ABDOMEN PELVIS W WO CONTRAST: ICD-10-PCS | Mod: 26,,, | Performed by: RADIOLOGY

## 2020-01-24 PROCEDURE — 63600175 PHARM REV CODE 636 W HCPCS: Performed by: NURSE PRACTITIONER

## 2020-01-24 PROCEDURE — 96366 THER/PROPH/DIAG IV INF ADDON: CPT

## 2020-01-24 PROCEDURE — 11000001 HC ACUTE MED/SURG PRIVATE ROOM

## 2020-01-24 PROCEDURE — 99214 PR OFFICE/OUTPT VISIT, EST, LEVL IV, 30-39 MIN: ICD-10-PCS | Mod: S$GLB,,, | Performed by: PHYSICIAN ASSISTANT

## 2020-01-24 PROCEDURE — 74178 CT ABD&PLV WO CNTR FLWD CNTR: CPT | Mod: TC

## 2020-01-24 PROCEDURE — 63600175 PHARM REV CODE 636 W HCPCS: Performed by: PHYSICIAN ASSISTANT

## 2020-01-24 PROCEDURE — 3008F BODY MASS INDEX DOCD: CPT | Mod: CPTII,S$GLB,, | Performed by: PHYSICIAN ASSISTANT

## 2020-01-24 PROCEDURE — 99999 PR PBB SHADOW E&M-EST. PATIENT-LVL IV: ICD-10-PCS | Mod: PBBFAC,,, | Performed by: PHYSICIAN ASSISTANT

## 2020-01-24 PROCEDURE — 96365 THER/PROPH/DIAG IV INF INIT: CPT

## 2020-01-24 PROCEDURE — 99222 PR INITIAL HOSPITAL CARE,LEVL II: ICD-10-PCS | Mod: ,,, | Performed by: SURGERY

## 2020-01-24 PROCEDURE — 99999 PR PBB SHADOW E&M-EST. PATIENT-LVL IV: CPT | Mod: PBBFAC,,, | Performed by: PHYSICIAN ASSISTANT

## 2020-01-24 PROCEDURE — 3008F PR BODY MASS INDEX (BMI) DOCUMENTED: ICD-10-PCS | Mod: CPTII,S$GLB,, | Performed by: PHYSICIAN ASSISTANT

## 2020-01-24 PROCEDURE — 74178 CT ABD&PLV WO CNTR FLWD CNTR: CPT | Mod: 26,,, | Performed by: RADIOLOGY

## 2020-01-24 PROCEDURE — 96367 TX/PROPH/DG ADDL SEQ IV INF: CPT

## 2020-01-24 PROCEDURE — 96368 THER/DIAG CONCURRENT INF: CPT

## 2020-01-24 PROCEDURE — 96376 TX/PRO/DX INJ SAME DRUG ADON: CPT

## 2020-01-24 PROCEDURE — 99222 1ST HOSP IP/OBS MODERATE 55: CPT | Mod: ,,, | Performed by: SURGERY

## 2020-01-24 PROCEDURE — S0030 INJECTION, METRONIDAZOLE: HCPCS | Performed by: NURSE PRACTITIONER

## 2020-01-24 PROCEDURE — 99214 OFFICE O/P EST MOD 30 MIN: CPT | Mod: S$GLB,,, | Performed by: PHYSICIAN ASSISTANT

## 2020-01-24 PROCEDURE — 25500020 PHARM REV CODE 255: Performed by: PHYSICIAN ASSISTANT

## 2020-01-24 RX ORDER — ONDANSETRON 2 MG/ML
4 INJECTION INTRAMUSCULAR; INTRAVENOUS EVERY 6 HOURS PRN
Status: DISCONTINUED | OUTPATIENT
Start: 2020-01-24 | End: 2020-01-25 | Stop reason: HOSPADM

## 2020-01-24 RX ORDER — SODIUM CHLORIDE 9 MG/ML
INJECTION, SOLUTION INTRAVENOUS CONTINUOUS
Status: DISCONTINUED | OUTPATIENT
Start: 2020-01-24 | End: 2020-01-25 | Stop reason: HOSPADM

## 2020-01-24 RX ORDER — METRONIDAZOLE 500 MG/100ML
500 INJECTION, SOLUTION INTRAVENOUS
Status: COMPLETED | OUTPATIENT
Start: 2020-01-24 | End: 2020-01-24

## 2020-01-24 RX ORDER — CIPROFLOXACIN 2 MG/ML
400 INJECTION, SOLUTION INTRAVENOUS ONCE
Status: COMPLETED | OUTPATIENT
Start: 2020-01-24 | End: 2020-01-24

## 2020-01-24 RX ORDER — TRAMADOL HYDROCHLORIDE 50 MG/1
50 TABLET ORAL EVERY 4 HOURS PRN
Status: DISCONTINUED | OUTPATIENT
Start: 2020-01-24 | End: 2020-01-25 | Stop reason: HOSPADM

## 2020-01-24 RX ORDER — METRONIDAZOLE 500 MG/100ML
500 INJECTION, SOLUTION INTRAVENOUS
Status: DISCONTINUED | OUTPATIENT
Start: 2020-01-24 | End: 2020-01-24

## 2020-01-24 RX ORDER — CIPROFLOXACIN 2 MG/ML
400 INJECTION, SOLUTION INTRAVENOUS
Status: DISCONTINUED | OUTPATIENT
Start: 2020-01-24 | End: 2020-01-24

## 2020-01-24 RX ADMIN — IOHEXOL 30 ML: 350 INJECTION, SOLUTION INTRAVENOUS at 09:01

## 2020-01-24 RX ADMIN — PIPERACILLIN AND TAZOBACTAM 4.5 G: 4; .5 INJECTION, POWDER, LYOPHILIZED, FOR SOLUTION INTRAVENOUS; PARENTERAL at 11:01

## 2020-01-24 RX ADMIN — CIPROFLOXACIN 400 MG: 2 INJECTION, SOLUTION INTRAVENOUS at 01:01

## 2020-01-24 RX ADMIN — IOHEXOL 100 ML: 350 INJECTION, SOLUTION INTRAVENOUS at 10:01

## 2020-01-24 RX ADMIN — METRONIDAZOLE 500 MG: 500 SOLUTION INTRAVENOUS at 12:01

## 2020-01-24 RX ADMIN — SODIUM CHLORIDE: 0.9 INJECTION, SOLUTION INTRAVENOUS at 01:01

## 2020-01-24 RX ADMIN — PIPERACILLIN AND TAZOBACTAM 4.5 G: 4; .5 INJECTION, POWDER, LYOPHILIZED, FOR SOLUTION INTRAVENOUS; PARENTERAL at 03:01

## 2020-01-24 NOTE — HPI
53 yo M with longstanding recurrent acute diverticulitis since 2017 self treating with antibiotics now with recurrent LLQ pain and fevers. He denies diarrhea, nausea, or emesis. He has never had a colonoscopy. ER workup revealed leukocytosis 15,000 and CT evidence of sigmoid diverticulitis with localized phlegmon.  Surgery consulted for evaluation.

## 2020-01-24 NOTE — PROGRESS NOTES
Subjective:      Patient ID: Dimas Darden is a 54 y.o. male.    Chief Complaint: GI Problem    Abdominal Pain   This is a recurrent problem. The current episode started in the past 7 days. The problem occurs intermittently. The problem has been waxing and waning. The pain is located in the LLQ. The pain is moderate. The abdominal pain radiates to the LLQ. Associated symptoms include anorexia, a fever and myalgias. Pertinent negatives include no arthralgias, belching, constipation, diarrhea, dysuria, flatus, frequency, headaches, hematochezia, hematuria, melena, nausea, vomiting or weight loss. Associated symptoms comments: Mucous in stool. The pain is aggravated by eating. The pain is relieved by bowel movements. Treatments tried: cipro and metro since monday. The treatment provided no relief. 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 There is no history of colon cancer, Crohn's disease, gallstones, GERD, irritable bowel syndrome, pancreatitis, PUD or ulcerative colitis. Patient's medical history does not include kidney stones and UTI.       Review of Systems   Constitutional: Positive for activity change, appetite change, chills, diaphoresis, fatigue and fever. Negative for unexpected weight change and weight loss.   HENT: Negative.  Negative for congestion, hearing loss, postnasal drip, rhinorrhea, sore throat, trouble swallowing and voice change.    Eyes: Negative.  Negative for visual disturbance.   Respiratory: Negative.  Negative for cough, choking, chest tightness and shortness of breath.    Cardiovascular: Negative for chest pain, palpitations and leg swelling.   Gastrointestinal: Positive for abdominal pain and anorexia. Negative for abdominal distention, anal bleeding, blood in stool, constipation, diarrhea, flatus, hematochezia, melena, nausea, rectal pain and vomiting.   Endocrine: Negative for cold intolerance, heat intolerance, polydipsia  "and polyuria.   Genitourinary: Negative.  Negative for difficulty urinating, dysuria, frequency and hematuria.   Musculoskeletal: Positive for myalgias. Negative for arthralgias, back pain, gait problem and joint swelling.   Skin: Negative for color change, pallor, rash and wound.   Neurological: Negative for dizziness, tremors, weakness, light-headedness, numbness and headaches.   Hematological: Negative for adenopathy.   Psychiatric/Behavioral: Negative for behavioral problems, confusion, self-injury, sleep disturbance and suicidal ideas. The patient is not nervous/anxious.      Objective:   /74 (BP Location: Left arm, Patient Position: Sitting, BP Method: Large (Manual))   Pulse 86   Temp 99.4 °F (37.4 °C) (Tympanic)   Ht 6' 2" (1.88 m)   Wt 101.5 kg (223 lb 12.3 oz)   SpO2 97%   BMI 28.73 kg/m²     Physical Exam   Constitutional: He is oriented to person, place, and time. He appears well-developed and well-nourished.   HENT:   Head: Normocephalic and atraumatic.   Right Ear: External ear normal.   Left Ear: External ear normal.   Nose: Nose normal.   Mouth/Throat: Oropharynx is clear and moist.   Eyes: Pupils are equal, round, and reactive to light. Conjunctivae and EOM are normal.   Neck: Normal range of motion. Neck supple.   Cardiovascular: Normal rate, regular rhythm and normal heart sounds. Exam reveals no gallop and no friction rub.   No murmur heard.  Pulmonary/Chest: Effort normal and breath sounds normal. No respiratory distress. He has no wheezes. He has no rales. He exhibits no tenderness.   Abdominal: Soft. Normal appearance and bowel sounds are normal. He exhibits no distension. There is tenderness in the left lower quadrant. There is rigidity, rebound and guarding. There is no CVA tenderness.   Musculoskeletal: Normal range of motion.   Lymphadenopathy:     He has no cervical adenopathy.   Neurological: He is alert and oriented to person, place, and time.   Skin: Skin is warm and dry. "   Psychiatric: He has a normal mood and affect. His behavior is normal. Judgment and thought content normal.   Vitals reviewed.    CT Abdomen Pelvis W Wo Contrast  Narrative: EXAMINATION:  CT ABDOMEN PELVIS W WO CONTRAST    CLINICAL HISTORY:  Abd pain, fever, abscess suspected;Left lower quadrant pain    TECHNIQUE:  Low dose axial images, sagittal and coronal reformations were obtained from the lung bases to the pubic symphysis before and following the IV administration of 100 mL of Omnipaque 350.  30 mL of oral Omnipaque 350 was also administered.    COMPARISON:  None    FINDINGS:  ABDOMEN    Lung bases: There are dependent changes noted within the bilateral lung bases.    Liver/gallbladder/biliary: There are multiple nonenhancing lesion seen scattered throughout both lobes of the liver including the caudate lobe most consistent with cysts.  The gallbladder is present and unremarkable.No biliary ductal dilation.    Pancreas: The pancreas is unremarkable in appearance.    Spleen: The spleen is not enlarged.    Adrenals: Unremarkable    Kidneys: The kidneys are equally perfused and demonstrate no solid masses. No nephrolithiasis.    Bowel/Mesentery: There is circumferential wall thickening associated with a fairly long segment of the mid sigmoid colon which measures approximately 6.5 cm in length.  There are diverticula noted in this region along with a pocket of some fluid and free air seen just anterior to this portion of the colon that measures approximately 2.7 cm and is suspicious for ruptured diverticulitis.  No well-defined fluid collections noted at this time.  There is inflammatory stranding seen surrounding this segment of the colon.  The possibility of an underlying colonic mass is not completely excluded and therefore follow-up with colonoscopy after the resolution of the acute event is recommended.    Retroperitoneum: No adenopathy.A retroaortic left renal vein is  noted.    PELVIS    Genitourinary/Reproductive organs: Unremarkable    Adenopathy: None    Free Fluid: No free fluid    Osseus Structures/Soft tissues: No suspicious appearing osseus lesions. No significant soft tissue abnormality.  Impression: 1. Findings highly suspicious for ruptured diverticulitis with a collection of air and minimal fluid seen anterior to the midportion of the sigmoid colon as described above.  No well-defined wall surrounding this collection at this time to suggest abscess.  The possibility of underlying colonic mass is not completely excluded and therefore further evaluation with colonoscopy is recommended after resolution of the acute event.  2. Multiple hypodensities noted within the liver most consistent with hepatic cysts.  3. Remaining findings as discussed above.  4.  This report was flagged in Epic as abnormal.    Electronically signed by: Yordy Patterson DO  Date:    01/24/2020  Time:    10:36    Lab Visit on 01/24/2020   Component Date Value Ref Range Status    WBC 01/24/2020 15.39* 3.90 - 12.70 K/uL Final    RBC 01/24/2020 5.28  4.60 - 6.20 M/uL Final    Hemoglobin 01/24/2020 15.7  14.0 - 18.0 g/dL Final    Hematocrit 01/24/2020 46.4  40.0 - 54.0 % Final    Mean Corpuscular Volume 01/24/2020 88  82 - 98 fL Final    Mean Corpuscular Hemoglobin 01/24/2020 29.7  27.0 - 31.0 pg Final    Mean Corpuscular Hemoglobin Conc 01/24/2020 33.8  32.0 - 36.0 g/dL Final    RDW 01/24/2020 11.9  11.5 - 14.5 % Final    Platelets 01/24/2020 277  150 - 350 K/uL Final    MPV 01/24/2020 10.1  9.2 - 12.9 fL Final    Immature Granulocytes 01/24/2020 0.5  0.0 - 0.5 % Final    Gran # (ANC) 01/24/2020 12.0* 1.8 - 7.7 K/uL Final    Immature Grans (Abs) 01/24/2020 0.07* 0.00 - 0.04 K/uL Final    Comment: Mild elevation in immature granulocytes is non specific and   can be seen in a variety of conditions including stress response,   acute inflammation, trauma and pregnancy. Correlation with other    laboratory and clinical findings is essential.      Lymph # 01/24/2020 1.8  1.0 - 4.8 K/uL Final    Mono # 01/24/2020 1.5* 0.3 - 1.0 K/uL Final    Eos # 01/24/2020 0.1  0.0 - 0.5 K/uL Final    Baso # 01/24/2020 0.03  0.00 - 0.20 K/uL Final    nRBC 01/24/2020 0  0 /100 WBC Final    Gran% 01/24/2020 77.8* 38.0 - 73.0 % Final    Lymph% 01/24/2020 11.5* 18.0 - 48.0 % Final    Mono% 01/24/2020 9.9  4.0 - 15.0 % Final    Eosinophil% 01/24/2020 0.6  0.0 - 8.0 % Final    Basophil% 01/24/2020 0.2  0.0 - 1.9 % Final    Differential Method 01/24/2020 Automated   Final    Sodium 01/24/2020 140  136 - 145 mmol/L Final    Potassium 01/24/2020 4.3  3.5 - 5.1 mmol/L Final    Chloride 01/24/2020 102  95 - 110 mmol/L Final    CO2 01/24/2020 27  23 - 29 mmol/L Final    Glucose 01/24/2020 110  70 - 110 mg/dL Final    BUN, Bld 01/24/2020 13  6 - 20 mg/dL Final    Creatinine 01/24/2020 1.1  0.5 - 1.4 mg/dL Final    Calcium 01/24/2020 10.2  8.7 - 10.5 mg/dL Final    Total Protein 01/24/2020 7.8  6.0 - 8.4 g/dL Final    Albumin 01/24/2020 4.0  3.5 - 5.2 g/dL Final    Total Bilirubin 01/24/2020 0.8  0.1 - 1.0 mg/dL Final    Comment: For infants and newborns, interpretation of results should be based  on gestational age, weight and in agreement with clinical  observations.  Premature Infant recommended reference ranges:  Up to 24 hours.............<8.0 mg/dL  Up to 48 hours............<12.0 mg/dL  3-5 days..................<15.0 mg/dL  6-29 days.................<15.0 mg/dL      Alkaline Phosphatase 01/24/2020 49* 55 - 135 U/L Final    AST 01/24/2020 16  10 - 40 U/L Final    ALT 01/24/2020 25  10 - 44 U/L Final    Anion Gap 01/24/2020 11  8 - 16 mmol/L Final    eGFR if African American 01/24/2020 >60  >60 mL/min/1.73 m^2 Final    eGFR if non African American 01/24/2020 >60  >60 mL/min/1.73 m^2 Final    Comment: Calculation used to obtain the estimated glomerular filtration  rate (eGFR) is the CKD-EPI equation.       Lab Visit on 01/24/2020   Component Date Value Ref Range Status    Specimen UA 01/24/2020 Urine, Clean Catch   Final    Color, UA 01/24/2020 Barbara  Yellow, Straw, Barbara Final    Appearance, UA 01/24/2020 Clear  Clear Final    pH, UA 01/24/2020 6.0  5.0 - 8.0 Final    Specific Gravity, UA 01/24/2020 >=1.030* 1.005 - 1.030 Final    Protein, UA 01/24/2020 Trace* Negative Final    Comment: Recommend a 24 hour urine protein or a urine   protein/creatinine ratio if globulin induced proteinuria is  clinically suspected.      Glucose, UA 01/24/2020 Negative  Negative Final    Ketones, UA 01/24/2020 Negative  Negative Final    Bilirubin (UA) 01/24/2020 Negative  Negative Final    Occult Blood UA 01/24/2020 Negative  Negative Final    Nitrite, UA 01/24/2020 Negative  Negative Final    Leukocytes, UA 01/24/2020 Negative  Negative Final       Assessment:     1. LLQ abdominal pain    2. Diverticulitis of large intestine with perforation, unspecified bleeding status      Plan:   LLQ abdominal pain  -     CT Abdomen Pelvis W Wo Contrast; Future; Expected date: 01/24/2020  -     CBC auto differential; Future; Expected date: 01/24/2020  -     Comprehensive metabolic panel; Future; Expected date: 01/24/2020  -     Urinalysis; Future; Expected date: 01/24/2020    Diverticulitis of large intestine with perforation, unspecified bleeding status    -advised pt to go to the ER for further eval and treatment.     Follow up if symptoms worsen or fail to improve.

## 2020-01-24 NOTE — SUBJECTIVE & OBJECTIVE
Current Facility-Administered Medications on File Prior to Encounter   Medication    [COMPLETED] iohexol (OMNIPAQUE 350) injection 100 mL    [COMPLETED] iohexol (OMNIPAQUE 350) injection 30 mL     Current Outpatient Medications on File Prior to Encounter   Medication Sig    tiZANidine (ZANAFLEX) 4 MG tablet Take 1 tablet (4 mg total) by mouth 2 (two) times daily as needed.    traMADol (ULTRAM) 50 mg tablet Take 1 tablet (50 mg total) by mouth every 4 (four) hours as needed.    ciprofloxacin HCl (CIPRO) 500 MG tablet Take 1 tablet (500 mg total) by mouth every 12 (twelve) hours.    [DISCONTINUED] metroNIDAZOLE (FLAGYL) 500 MG tablet Take 1 tablet (500 mg total) by mouth every 12 (twelve) hours.    [DISCONTINUED] phentermine (ADIPEX-P) 37.5 mg tablet Take 1 tablet (37.5 mg total) by mouth once daily. (Patient not taking: Reported on 10/11/2019)       Review of patient's allergies indicates:  No Known Allergies    Past Medical History:   Diagnosis Date    Disc     Bulging disc in back    Diverticulitis      Past Surgical History:   Procedure Laterality Date    UNDESCENDED TESTICLE EXPLORATION Left     Age 15     Family History     None        Tobacco Use    Smoking status: Former Smoker     Packs/day: 1.00     Years: 25.00     Pack years: 25.00     Last attempt to quit: 4/10/2012     Years since quittin.7    Smokeless tobacco: Never Used   Substance and Sexual Activity    Alcohol use: Yes     Comment: socially    Drug use: No    Sexual activity: Yes     Partners: Female     Review of Systems   Constitutional: Positive for fever. Negative for unexpected weight change.   HENT: Negative for congestion.    Eyes: Negative for visual disturbance.   Respiratory: Negative for shortness of breath.    Cardiovascular: Negative for chest pain.   Gastrointestinal: Positive for abdominal pain and nausea. Negative for diarrhea and vomiting.   Genitourinary: Negative for difficulty urinating.   Skin: Negative for  rash.   Allergic/Immunologic: Negative for immunocompromised state.   Neurological: Negative for weakness.   Psychiatric/Behavioral: The patient is not nervous/anxious.      Objective:     Vital Signs (Most Recent):  Temp: 98.6 °F (37 °C) (01/24/20 1116)  Pulse: 108 (01/24/20 1116)  Resp: 16 (01/24/20 1116)  BP: (!) 136/96 (01/24/20 1116)  SpO2: 98 % (01/24/20 1116) Vital Signs (24h Range):  Temp:  [98.6 °F (37 °C)-99.4 °F (37.4 °C)] 98.6 °F (37 °C)  Pulse:  [] 108  Resp:  [16] 16  SpO2:  [97 %-98 %] 98 %  BP: (114-136)/(74-96) 136/96     Weight: 102.2 kg (225 lb 5 oz)  Body mass index is 28.93 kg/m².    Physical Exam   Constitutional: He is oriented to person, place, and time. He appears well-developed and well-nourished. No distress.   HENT:   Head: Normocephalic and atraumatic.   Eyes: EOM are normal.   Neck: Neck supple.   Cardiovascular: Normal rate and regular rhythm.   Pulmonary/Chest: Effort normal.   Abdominal: Soft. He exhibits no distension. There is tenderness (LLQ). There is no rebound and no guarding.   No peritonitis   Musculoskeletal: Normal range of motion.   Neurological: He is alert and oriented to person, place, and time.   Skin: Skin is warm.   Vitals reviewed.      Significant Labs:  CBC:   Recent Labs   Lab 01/24/20  0838   WBC 15.39*   RBC 5.28   HGB 15.7   HCT 46.4      MCV 88   MCH 29.7   MCHC 33.8     BMP:   Recent Labs   Lab 01/24/20  0838         K 4.3      CO2 27   BUN 13   CREATININE 1.1   CALCIUM 10.2       Significant Diagnostics:  CT: I have reviewed all pertinent results/findings within the past 24 hours and my personal findings are:  sigmoid diverticulitis with associated plegmon no pneumoperitoneum

## 2020-01-24 NOTE — ED NOTES
Patient identifiers verified and correct for Dimas Darden.    Pt c/o LLQ abd pain with hx of diverticulosis. Denies N/V/D. Pt reports was seem at the [facility] this morning and sent d/t CT scan results  LOC: The patient is awake, alert and aware of environment with an appropriate affect, the patient is oriented x 3 and speaking appropriately.  APPEARANCE: Patient resting comfortably and in no acute distress, patient is clean and well groomed, patient's clothing is properly fastened.  SKIN: The skin is warm and dry, color consistent with ethnicity, patient has normal skin turgor and moist mucus membranes, skin intact, no breakdown or bruising noted.  MUSCULOSKELETAL: Patient moving all extremities spontaneously.  RESPIRATORY: Airway is open and patent, respirations are spontaneous.  CARDIAC: Patient has a normal rate, no periphreal edema noted, capillary refill < 3 seconds.  ABDOMEN: Soft, tender to LLQ

## 2020-01-24 NOTE — H&P
[facility] Hospital Medicine  History & Physical    Patient Name: Dimas Darden  MRN: [id number]  Admission Date: 1/24/2020  Attending Physician: John Khan MD   Primary Care Provider: Angela Payne MD         Patient information was obtained from patient and ER records.     Subjective:     Principal Problem:Diverticulitis of large intestine with perforation without abscess or bleeding    Chief Complaint:   Chief Complaint   Patient presents with    Abdominal Pain     sent here by the [facility] following CT scan; history of diverticulosis        HPI: Mr Darden is a 54 year old male with PMHx Diverticulosis who presented to [facility] with complaints of LLQ pain. He was referred from [facility] after having CT scan today for evaluation of possible ruptured diverticulum. Denies associated symptoms of chest pain, shortness of breath, fever, chills, nausea, vomiting or diarrhea. General Surgery consulted, recommend IV antibiotics, and bowel rest. He had been on Cipro and Flagyl at home. He reviewed IV Cipro and IV Flagyl in the Emergency Room. Will continue Zosyn. He is being admitted under the care of Hospital Medicine.     Past Medical History:   Diagnosis Date    Disc     Bulging disc in back    Diverticulitis        Past Surgical History:   Procedure Laterality Date    UNDESCENDED TESTICLE EXPLORATION Left     Age 15       Review of patient's allergies indicates:  No Known Allergies    Current Facility-Administered Medications on File Prior to Encounter   Medication    [COMPLETED] iohexol (OMNIPAQUE 350) injection 100 mL    [COMPLETED] iohexol (OMNIPAQUE 350) injection 30 mL     Current Outpatient Medications on File Prior to Encounter   Medication Sig    tiZANidine (ZANAFLEX) 4 MG tablet Take 1 tablet (4 mg total) by mouth 2 (two) times daily as needed.    traMADol (ULTRAM) 50 mg tablet Take 1 tablet (50 mg total) by mouth every 4 (four) hours as needed.    ciprofloxacin HCl (CIPRO) 500 MG  tablet Take 1 tablet (500 mg total) by mouth every 12 (twelve) hours.    [DISCONTINUED] metroNIDAZOLE (FLAGYL) 500 MG tablet Take 1 tablet (500 mg total) by mouth every 12 (twelve) hours.    [DISCONTINUED] phentermine (ADIPEX-P) 37.5 mg tablet Take 1 tablet (37.5 mg total) by mouth once daily. (Patient not taking: Reported on 10/11/2019)     Family History     None        Tobacco Use    Smoking status: Former Smoker     Packs/day: 1.00     Years: 25.00     Pack years: 25.00     Last attempt to quit: 4/10/2012     Years since quittin.7    Smokeless tobacco: Never Used   Substance and Sexual Activity    Alcohol use: Yes     Comment: socially    Drug use: No    Sexual activity: Yes     Partners: Female     Review of Systems   Constitutional: Negative for chills, diaphoresis, fatigue and fever.   HENT: Negative for congestion, ear discharge, rhinorrhea, sinus pressure, sore throat and trouble swallowing.    Eyes: Negative for discharge and visual disturbance.   Respiratory: Negative for apnea, cough, choking, chest tightness, shortness of breath, wheezing and stridor.    Cardiovascular: Negative for chest pain, palpitations and leg swelling.   Gastrointestinal: Positive for abdominal pain (left lower quadrant ). Negative for abdominal distention, diarrhea, nausea and vomiting.   Endocrine: Negative for cold intolerance and heat intolerance.   Genitourinary: Negative for dysuria, frequency and hematuria.   Musculoskeletal: Negative for arthralgias, back pain, myalgias and neck pain.   Skin: Negative for pallor and rash.   Neurological: Negative for dizziness, seizures, syncope, weakness and headaches.   Psychiatric/Behavioral: Negative for agitation, confusion and sleep disturbance.     Objective:     Vital Signs (Most Recent):  Temp: 98.3 °F (36.8 °C) (20)  Pulse: 78 (20)  Resp: 18 (20)  BP: (!) 181/92 (20)  SpO2: 97 % (20) Vital Signs (24h Range):  Temp:   [98.3 °F (36.8 °C)-99.4 °F (37.4 °C)] 98.3 °F (36.8 °C)  Pulse:  [] 78  Resp:  [16-18] 18  SpO2:  [97 %-98 %] 97 %  BP: (114-181)/(74-96) 181/92     Weight: 102.2 kg (225 lb 5 oz)  Body mass index is 28.93 kg/m².    Physical Exam   Constitutional: He is oriented to person, place, and time. No distress.   HENT:   Mouth/Throat: No oropharyngeal exudate.   Eyes: Right eye exhibits no discharge. Left eye exhibits no discharge.   Cardiovascular: Exam reveals no gallop and no friction rub.   No murmur heard.  Pulmonary/Chest: No stridor. No respiratory distress. He has no wheezes. He has no rales. He exhibits no tenderness.   Abdominal: He exhibits no distension and no mass. There is tenderness in the right lower quadrant. There is no rebound and no guarding. No hernia.   Musculoskeletal: He exhibits no edema or deformity.   Neurological: He is alert and oriented to person, place, and time.   Skin: Skin is warm and dry. He is not diaphoretic.   Nursing note and vitals reviewed.          Significant Labs:   CBC:   Recent Labs   Lab 01/24/20  0838   WBC 15.39*   HGB 15.7   HCT 46.4        CMP:   Recent Labs   Lab 01/24/20  0838      K 4.3      CO2 27      BUN 13   CREATININE 1.1   CALCIUM 10.2   PROT 7.8   ALBUMIN 4.0   BILITOT 0.8   ALKPHOS 49*   AST 16   ALT 25   ANIONGAP 11   EGFRNONAA >60       Significant Imaging:     CT of abdomen/pelvix 1/24/2020  Findings highly suspicious for ruptured diverticulitis with a collection of air and minimal fluid seen anterior to the midportion of the sigmoid colon as described above.  No well-defined wall surrounding this collection at this time to suggest abscess.  The possibility of underlying colonic mass is not completely excluded and therefore further evaluation with colonoscopy is recommended after resolution of the acute event.    Assessment/Plan:     * Diverticulitis of large intestine with perforation without abscess or bleeding  Admit to  Med/Surg   Consult General Surgery   NPO   IVF   Cipro and Flagyl given in ED   Continue Zosyn   Pain and nausea control       Muscle spasm  Hisrtory of muscle   Monitor for now         VTE Risk Mitigation (From admission, onward)         Ordered     Place sequential compression device  Until discontinued      01/24/20 4073                   Yosvany Moseley NP  Department of Hospital Medicine   [facility]

## 2020-01-24 NOTE — CONSULTS
[facility] -   General Surgery  Consult Note    Patient Name: Dimas Darden  MRN: [id number]  Code Status: Prior  Admission Date: 1/24/2020  Hospital Length of Stay: 0 days  Attending Physician: Tim Sullivan MD  Primary Care Provider: Angela Payne MD    Patient information was obtained from patient and ER records.     Inpatient consult to General Surgery  Consult performed by: Aleja Barnett MD  Consult ordered by: TABATHA Cedillo        Subjective:     Principal Problem: <principal problem not specified>    History of Present Illness: 55 yo M with longstanding recurrent acute diverticulitis since 2017 self treating with antibiotics now with recurrent LLQ pain and fevers. He denies diarrhea, nausea, or emesis. He has never had a colonoscopy. ER workup revealed leukocytosis 15,000 and CT evidence of sigmoid diverticulitis with localized phlegmon.  Surgery consulted for evaluation.    Current Facility-Administered Medications on File Prior to Encounter   Medication    [COMPLETED] iohexol (OMNIPAQUE 350) injection 100 mL    [COMPLETED] iohexol (OMNIPAQUE 350) injection 30 mL     Current Outpatient Medications on File Prior to Encounter   Medication Sig    tiZANidine (ZANAFLEX) 4 MG tablet Take 1 tablet (4 mg total) by mouth 2 (two) times daily as needed.    traMADol (ULTRAM) 50 mg tablet Take 1 tablet (50 mg total) by mouth every 4 (four) hours as needed.    ciprofloxacin HCl (CIPRO) 500 MG tablet Take 1 tablet (500 mg total) by mouth every 12 (twelve) hours.    [DISCONTINUED] metroNIDAZOLE (FLAGYL) 500 MG tablet Take 1 tablet (500 mg total) by mouth every 12 (twelve) hours.    [DISCONTINUED] phentermine (ADIPEX-P) 37.5 mg tablet Take 1 tablet (37.5 mg total) by mouth once daily. (Patient not taking: Reported on 10/11/2019)       Review of patient's allergies indicates:  No Known Allergies    Past Medical History:   Diagnosis Date    Disc     Bulging disc in back     Diverticulitis      Past Surgical History:   Procedure Laterality Date    UNDESCENDED TESTICLE EXPLORATION Left     Age 15     Family History     None        Tobacco Use    Smoking status: Former Smoker     Packs/day: 1.00     Years: 25.00     Pack years: 25.00     Last attempt to quit: 4/10/2012     Years since quittin.7    Smokeless tobacco: Never Used   Substance and Sexual Activity    Alcohol use: Yes     Comment: socially    Drug use: No    Sexual activity: Yes     Partners: Female     Review of Systems   Constitutional: Positive for fever. Negative for unexpected weight change.   HENT: Negative for congestion.    Eyes: Negative for visual disturbance.   Respiratory: Negative for shortness of breath.    Cardiovascular: Negative for chest pain.   Gastrointestinal: Positive for abdominal pain and nausea. Negative for diarrhea and vomiting.   Genitourinary: Negative for difficulty urinating.   Skin: Negative for rash.   Allergic/Immunologic: Negative for immunocompromised state.   Neurological: Negative for weakness.   Psychiatric/Behavioral: The patient is not nervous/anxious.      Objective:     Vital Signs (Most Recent):  Temp: 98.6 °F (37 °C) (20 1116)  Pulse: 108 (20 1116)  Resp: 16 (20 1116)  BP: (!) 136/96 (20 1116)  SpO2: 98 % (20 1116) Vital Signs (24h Range):  Temp:  [98.6 °F (37 °C)-99.4 °F (37.4 °C)] 98.6 °F (37 °C)  Pulse:  [] 108  Resp:  [16] 16  SpO2:  [97 %-98 %] 98 %  BP: (114-136)/(74-96) 136/96     Weight: 102.2 kg (225 lb 5 oz)  Body mass index is 28.93 kg/m².    Physical Exam   Constitutional: He is oriented to person, place, and time. He appears well-developed and well-nourished. No distress.   HENT:   Head: Normocephalic and atraumatic.   Eyes: EOM are normal.   Neck: Neck supple.   Cardiovascular: Normal rate and regular rhythm.   Pulmonary/Chest: Effort normal.   Abdominal: Soft. He exhibits no distension. There is tenderness (LLQ). There is no  rebound and no guarding.   No peritonitis   Musculoskeletal: Normal range of motion.   Neurological: He is alert and oriented to person, place, and time.   Skin: Skin is warm.   Vitals reviewed.      Significant Labs:  CBC:   Recent Labs   Lab 01/24/20  0838   WBC 15.39*   RBC 5.28   HGB 15.7   HCT 46.4      MCV 88   MCH 29.7   MCHC 33.8     BMP:   Recent Labs   Lab 01/24/20  0838         K 4.3      CO2 27   BUN 13   CREATININE 1.1   CALCIUM 10.2       Significant Diagnostics:  CT: I have reviewed all pertinent results/findings within the past 24 hours and my personal findings are:  sigmoid diverticulitis with associated plegmon no pneumoperitoneum    Assessment/Plan:     Diverticulitis of large intestine with perforation without abscess or bleeding  Patient with complicated diverticulitis.  Recommend admit, iv antibiotics, and bowel rest.  Reviewed imaging with Dr. Guerrero. Based on size and clinical picture, should resolved with antibiotics. If patient develops worsening symptoms, may warrant percutaneous drainage by IR.   This was discussed with the patient at bedside. All questions answered.       VTE Risk Mitigation (From admission, onward)    None          Thank you for your consult. I will follow-up with patient. Please contact us if you have any additional questions.    Aleja Barnett MD  General Surgery  [facility] -

## 2020-01-24 NOTE — ED NOTES
Report received from ISELA Whyte at this time.    Patient sitting on side of bed, in NAD, RR e/u, talking on cell phone. Stretcher locked in lowest position, side rails up x1, call bell within reach. Will continue to monitor.

## 2020-01-24 NOTE — HPI
Mr Darden is a 54 year old male with PMHx Diverticulosis who presented to [facility] with complaints of LLQ pain. He was referred from [facility] after having CT scan today for evaluation of possible ruptured diverticulum. Denies associated symptoms of chest pain, shortness of breath, fever, chills, nausea, vomiting or diarrhea. General Surgery consulted, recommend IV antibiotics, and bowel rest. He had been on Cipro and Flagyl at home. He reviewed IV Cipro and IV Flagyl in the Emergency Room. Will continue Zosyn. He is being admitted under the care of Hospital Medicine.

## 2020-01-24 NOTE — ED PROVIDER NOTES
Per Jerzy Katz, NP: 54 year old male with complaint of left lower abdominal pain X one week.  Pt had CT scan today at [facility] and sent here for possible ruptured diverticulum.  No fever or chills.  Pt had blood work this morning.      SCRIBE #1 NOTE: I, Jsoh Teixeira, am scribing for, and in the presence of, Tim Sullivan MD. I have scribed the entire note.      History      Chief Complaint   Patient presents with    Abdominal Pain     sent here by the [facility] following CT scan; history of diverticulosis       Review of patient's allergies indicates:  No Known Allergies     HPI   HPI    2020, 12:21 PM   History obtained from the patient      History of Present Illness: Dimas Darden is a 54 y.o. male patient with a PMHx of diverticulitis who presents to the Emergency Department for LLQ abdominal pain, onset 1 week PTA. Pt presented to the [facility] earlier today, had a CT done, and referred to the ED here for further evaluation of possible ruptured diverticulum. Symptoms are constant and fluctuate in severity. No mitigating or exacerbating factors reported. No associated sxs reported. Patient denies any fever, chills, n/v/d, SOB, CP, weakness, numbness, dizziness, headache, and all other sxs at this time. Pt was started on Cipro and Flagyl 4 days ago. No further complaints or concerns at this time.     Arrival mode: Personal vehicle    PCP: Angela Payne MD       Past Medical History:  Past Medical History:   Diagnosis Date    Disc     Bulging disc in back    Diverticulitis        Past Surgical History:  Past Surgical History:   Procedure Laterality Date    UNDESCENDED TESTICLE EXPLORATION Left     Age 15         Family History:  History reviewed. No pertinent family history.    Social History:  Social History     Tobacco Use    Smoking status: Former Smoker     Packs/day: 1.00     Years: 25.00     Pack years: 25.00     Last attempt to quit: 4/10/2012     Years since quittin.7    Smokeless  tobacco: Never Used   Substance and Sexual Activity    Alcohol use: Yes     Comment: socially    Drug use: No    Sexual activity: Yes     Partners: Female       ROS   Review of Systems   Constitutional: Negative for chills, diaphoresis, fatigue and fever.   HENT: Negative for sore throat.    Respiratory: Negative for shortness of breath.    Cardiovascular: Negative for chest pain.   Gastrointestinal: Positive for abdominal pain (LLQ). Negative for diarrhea, nausea and vomiting.   Genitourinary: Negative for dysuria.   Musculoskeletal: Negative for back pain.   Skin: Negative for rash.   Neurological: Negative for dizziness, weakness, light-headedness, numbness and headaches.   Hematological: Does not bruise/bleed easily.   All other systems reviewed and are negative.    Physical Exam      Initial Vitals [01/24/20 1116]   BP Pulse Resp Temp SpO2   (!) 136/96 108 16 98.6 °F (37 °C) 98 %      MAP       --          Physical Exam  Nursing Notes and Vital Signs Reviewed.  Constitutional: Patient is in no acute distress. Well-developed and well-nourished.  Head: Atraumatic. Normocephalic.  Eyes: PERRL. EOM intact. Conjunctivae are not pale. No scleral icterus.  ENT: Mucous membranes are moist. Oropharynx is clear and symmetric.    Neck: Supple. Full ROM. No lymphadenopathy.  Cardiovascular: Regular rate. Regular rhythm. No murmurs, rubs, or gallops. Distal pulses are 2+ and symmetric.  Pulmonary/Chest: No respiratory distress. Clear to auscultation bilaterally. No wheezing or rales.  Abdominal: Soft and non-distended.  There is no tenderness.  No rebound, guarding, or rigidity.   Musculoskeletal: Moves all extremities. No obvious deformities. No edema.  Skin: Warm and dry.  Neurological:  Alert, awake, and appropriate.  Normal speech.  No acute focal neurological deficits are appreciated.  Psychiatric: Normal affect. Good eye contact. Appropriate in content.    ED Course    Procedures  ED Vital Signs:  Vitals:     "01/24/20 1116   BP: (!) 136/96   Pulse: 108   Resp: 16   Temp: 98.6 °F (37 °C)   TempSrc: Oral   SpO2: 98%   Weight: 102.2 kg (225 lb 5 oz)   Height: 6' 2" (1.88 m)     Labs and imaging reviewed from [facility] on 1/24/20:    Lab Visit on 01/24/2020   Component Date Value Ref Range Status    WBC 01/24/2020 15.39* 3.90 - 12.70 K/uL Final    RBC 01/24/2020 5.28  4.60 - 6.20 M/uL Final    Hemoglobin 01/24/2020 15.7  14.0 - 18.0 g/dL Final    Hematocrit 01/24/2020 46.4  40.0 - 54.0 % Final    Mean Corpuscular Volume 01/24/2020 88  82 - 98 fL Final    Mean Corpuscular Hemoglobin 01/24/2020 29.7  27.0 - 31.0 pg Final    Mean Corpuscular Hemoglobin Conc 01/24/2020 33.8  32.0 - 36.0 g/dL Final    RDW 01/24/2020 11.9  11.5 - 14.5 % Final    Platelets 01/24/2020 277  150 - 350 K/uL Final    MPV 01/24/2020 10.1  9.2 - 12.9 fL Final    Immature Granulocytes 01/24/2020 0.5  0.0 - 0.5 % Final    Gran # (ANC) 01/24/2020 12.0* 1.8 - 7.7 K/uL Final    Immature Grans (Abs) 01/24/2020 0.07* 0.00 - 0.04 K/uL Final    Comment: Mild elevation in immature granulocytes is non specific and   can be seen in a variety of conditions including stress response,   acute inflammation, trauma and pregnancy. Correlation with other   laboratory and clinical findings is essential.      Lymph # 01/24/2020 1.8  1.0 - 4.8 K/uL Final    Mono # 01/24/2020 1.5* 0.3 - 1.0 K/uL Final    Eos # 01/24/2020 0.1  0.0 - 0.5 K/uL Final    Baso # 01/24/2020 0.03  0.00 - 0.20 K/uL Final    nRBC 01/24/2020 0  0 /100 WBC Final    Gran% 01/24/2020 77.8* 38.0 - 73.0 % Final    Lymph% 01/24/2020 11.5* 18.0 - 48.0 % Final    Mono% 01/24/2020 9.9  4.0 - 15.0 % Final    Eosinophil% 01/24/2020 0.6  0.0 - 8.0 % Final    Basophil% 01/24/2020 0.2  0.0 - 1.9 % Final    Differential Method 01/24/2020 Automated   Final    Sodium 01/24/2020 140  136 - 145 mmol/L Final    Potassium 01/24/2020 4.3  3.5 - 5.1 mmol/L Final    Chloride 01/24/2020 102  95 - 110 mmol/L " Final    CO2 01/24/2020 27  23 - 29 mmol/L Final    Glucose 01/24/2020 110  70 - 110 mg/dL Final    BUN, Bld 01/24/2020 13  6 - 20 mg/dL Final    Creatinine 01/24/2020 1.1  0.5 - 1.4 mg/dL Final    Calcium 01/24/2020 10.2  8.7 - 10.5 mg/dL Final    Total Protein 01/24/2020 7.8  6.0 - 8.4 g/dL Final    Albumin 01/24/2020 4.0  3.5 - 5.2 g/dL Final    Total Bilirubin 01/24/2020 0.8  0.1 - 1.0 mg/dL Final    Comment: For infants and newborns, interpretation of results should be based  on gestational age, weight and in agreement with clinical  observations.  Premature Infant recommended reference ranges:  Up to 24 hours.............<8.0 mg/dL  Up to 48 hours............<12.0 mg/dL  3-5 days..................<15.0 mg/dL  6-29 days.................<15.0 mg/dL      Alkaline Phosphatase 01/24/2020 49* 55 - 135 U/L Final    AST 01/24/2020 16  10 - 40 U/L Final    ALT 01/24/2020 25  10 - 44 U/L Final    Anion Gap 01/24/2020 11  8 - 16 mmol/L Final    eGFR if African American 01/24/2020 >60  >60 mL/min/1.73 m^2 Final    eGFR if non African American 01/24/2020 >60  >60 mL/min/1.73 m^2 Final    Comment: Calculation used to obtain the estimated glomerular filtration  rate (eGFR) is the CKD-EPI equation.      Lab Visit on 01/24/2020   Component Date Value Ref Range Status    Specimen UA 01/24/2020 Urine, Clean Catch   Final    Color, UA 01/24/2020 Barbara  Yellow, Straw, Barbara Final    Appearance, UA 01/24/2020 Clear  Clear Final    pH, UA 01/24/2020 6.0  5.0 - 8.0 Final    Specific Gravity, UA 01/24/2020 >=1.030* 1.005 - 1.030 Final    Protein, UA 01/24/2020 Trace* Negative Final    Comment: Recommend a 24 hour urine protein or a urine   protein/creatinine ratio if globulin induced proteinuria is  clinically suspected.      Glucose, UA 01/24/2020 Negative  Negative Final    Ketones, UA 01/24/2020 Negative  Negative Final    Bilirubin (UA) 01/24/2020 Negative  Negative Final    Occult Blood UA 01/24/2020  Negative  Negative Final    Nitrite, UA 01/24/2020 Negative  Negative Final    Leukocytes, UA 01/24/2020 Negative  Negative Final     CT Abdomen Pelvis W Wo Contrast   Order: [id number]   Status:  Final result   Visible to patient:  Yes (Patient Portal) Next appt:  None Dx:  LLQ abdominal pain   Details     Reading Physician Reading Date Result Priority   Yordy Patterson, DO 1/24/2020 STAT      Narrative     EXAMINATION:  CT ABDOMEN PELVIS W WO CONTRAST    CLINICAL HISTORY:  Abd pain, fever, abscess suspected;Left lower quadrant pain    TECHNIQUE:  Low dose axial images, sagittal and coronal reformations were obtained from the lung bases to the pubic symphysis before and following the IV administration of 100 mL of Omnipaque 350.  30 mL of oral Omnipaque 350 was also administered.    COMPARISON:  None    FINDINGS:  ABDOMEN    Lung bases: There are dependent changes noted within the bilateral lung bases.    Liver/gallbladder/biliary: There are multiple nonenhancing lesion seen scattered throughout both lobes of the liver including the caudate lobe most consistent with cysts.  The gallbladder is present and unremarkable.No biliary ductal dilation.    Pancreas: The pancreas is unremarkable in appearance.    Spleen: The spleen is not enlarged.    Adrenals: Unremarkable    Kidneys: The kidneys are equally perfused and demonstrate no solid masses. No nephrolithiasis.    Bowel/Mesentery: There is circumferential wall thickening associated with a fairly long segment of the mid sigmoid colon which measures approximately 6.5 cm in length.  There are diverticula noted in this region along with a pocket of some fluid and free air seen just anterior to this portion of the colon that measures approximately 2.7 cm and is suspicious for ruptured diverticulitis.  No well-defined fluid collections noted at this time.  There is inflammatory stranding seen surrounding this segment of the colon.  The possibility of an underlying  colonic mass is not completely excluded and therefore follow-up with colonoscopy after the resolution of the acute event is recommended.    Retroperitoneum: No adenopathy.A retroaortic left renal vein is noted.    PELVIS    Genitourinary/Reproductive organs: Unremarkable    Adenopathy: None    Free Fluid: No free fluid    Osseus Structures/Soft tissues: No suspicious appearing osseus lesions. No significant soft tissue abnormality.      Impression       1. Findings highly suspicious for ruptured diverticulitis with a collection of air and minimal fluid seen anterior to the midportion of the sigmoid colon as described above.  No well-defined wall surrounding this collection at this time to suggest abscess.  The possibility of underlying colonic mass is not completely excluded and therefore further evaluation with colonoscopy is recommended after resolution of the acute event.  2. Multiple hypodensities noted within the liver most consistent with hepatic cysts.  3. Remaining findings as discussed above.  4.  This report was flagged in Epic as abnormal.      Electronically signed by: Yordy Patterson DO  Date: 01/24/2020  Time: 10:36             Last Resulted: 01/24/20 10:36                    The Emergency Provider reviewed the vital signs and test results, which are outlined above.    ED Discussion     12:45 PM: Discussed pt's case with Dr. Barnett (General Surgery) who recommends admission to Hospital Medicine.    1:12 PM: Discussed case with TABATHA Cedillo (Hospital Medicine). Dr. Khan agrees with current care and management of pt and accepts admission.   Admitting Service: Hospital Medicine  Admitting Physician: Dr. Khan  Admit to: Med Surg    1:13 PM: Re-evaluated pt. I have discussed test results, shared treatment plan, and the need for admission with patient and family at bedside. Pt and family express understanding at this time and agree with all information. All questions answered. Pt and family have  no further questions or concerns at this time. Pt is ready for admit.         ED Medication(s):  Medications   ciprofloxacin (CIPRO)400mg/200ml D5W IVPB 400 mg (has no administration in time range)   piperacillin-tazobactam 4.5 g in dextrose 5 % 100 mL IVPB (ready to mix system) (has no administration in time range)   0.9%  NaCl infusion (has no administration in time range)   metronidazole IVPB 500 mg (500 mg Intravenous New Bag 1/24/20 1202)     New Prescriptions    No medications on file           Medical Decision Making    Medical Decision Making:   Clinical Tests:   Lab Tests: Reviewed  Radiological Study: Reviewed           Scribe Attestation:   Scribe #1: I performed the above scribed service and the documentation accurately describes the services I performed. I attest to the accuracy of the note.    Attending:   Physician Attestation Statement for Scribe #1: I, Tim Sullivan MD, personally performed the services described in this documentation, as scribed by Josh Teixeira, in my presence, and it is both accurate and complete.          Clinical Impression       ICD-10-CM ICD-9-CM   1. Diverticulitis of large intestine with perforation without abscess or bleeding K57.20 562.11     569.83   2. Left lower quadrant abdominal pain R10.32 789.04       Disposition:   Disposition: Admitted  Condition: Fair         Tim Sullivan MD  01/24/20 3256

## 2020-01-24 NOTE — SUBJECTIVE & OBJECTIVE
Past Medical History:   Diagnosis Date    Disc     Bulging disc in back    Diverticulitis        Past Surgical History:   Procedure Laterality Date    UNDESCENDED TESTICLE EXPLORATION Left     Age 15       Review of patient's allergies indicates:  No Known Allergies    Current Facility-Administered Medications on File Prior to Encounter   Medication    [COMPLETED] iohexol (OMNIPAQUE 350) injection 100 mL    [COMPLETED] iohexol (OMNIPAQUE 350) injection 30 mL     Current Outpatient Medications on File Prior to Encounter   Medication Sig    tiZANidine (ZANAFLEX) 4 MG tablet Take 1 tablet (4 mg total) by mouth 2 (two) times daily as needed.    traMADol (ULTRAM) 50 mg tablet Take 1 tablet (50 mg total) by mouth every 4 (four) hours as needed.    ciprofloxacin HCl (CIPRO) 500 MG tablet Take 1 tablet (500 mg total) by mouth every 12 (twelve) hours.    [DISCONTINUED] metroNIDAZOLE (FLAGYL) 500 MG tablet Take 1 tablet (500 mg total) by mouth every 12 (twelve) hours.    [DISCONTINUED] phentermine (ADIPEX-P) 37.5 mg tablet Take 1 tablet (37.5 mg total) by mouth once daily. (Patient not taking: Reported on 10/11/2019)     Family History     None        Tobacco Use    Smoking status: Former Smoker     Packs/day: 1.00     Years: 25.00     Pack years: 25.00     Last attempt to quit: 4/10/2012     Years since quittin.7    Smokeless tobacco: Never Used   Substance and Sexual Activity    Alcohol use: Yes     Comment: socially    Drug use: No    Sexual activity: Yes     Partners: Female     Review of Systems   Constitutional: Negative for chills, diaphoresis, fatigue and fever.   HENT: Negative for congestion, ear discharge, rhinorrhea, sinus pressure, sore throat and trouble swallowing.    Eyes: Negative for discharge and visual disturbance.   Respiratory: Negative for apnea, cough, choking, chest tightness, shortness of breath, wheezing and stridor.    Cardiovascular: Negative for chest pain, palpitations and leg  swelling.   Gastrointestinal: Positive for abdominal pain (left lower quadrant ). Negative for abdominal distention, diarrhea, nausea and vomiting.   Endocrine: Negative for cold intolerance and heat intolerance.   Genitourinary: Negative for dysuria, frequency and hematuria.   Musculoskeletal: Negative for arthralgias, back pain, myalgias and neck pain.   Skin: Negative for pallor and rash.   Neurological: Negative for dizziness, seizures, syncope, weakness and headaches.   Psychiatric/Behavioral: Negative for agitation, confusion and sleep disturbance.     Objective:     Vital Signs (Most Recent):  Temp: 98.3 °F (36.8 °C) (01/24/20 1649)  Pulse: 78 (01/24/20 1649)  Resp: 18 (01/24/20 1649)  BP: (!) 181/92 (01/24/20 1649)  SpO2: 97 % (01/24/20 1649) Vital Signs (24h Range):  Temp:  [98.3 °F (36.8 °C)-99.4 °F (37.4 °C)] 98.3 °F (36.8 °C)  Pulse:  [] 78  Resp:  [16-18] 18  SpO2:  [97 %-98 %] 97 %  BP: (114-181)/(74-96) 181/92     Weight: 102.2 kg (225 lb 5 oz)  Body mass index is 28.93 kg/m².    Physical Exam   Constitutional: He is oriented to person, place, and time. No distress.   HENT:   Mouth/Throat: No oropharyngeal exudate.   Eyes: Right eye exhibits no discharge. Left eye exhibits no discharge.   Cardiovascular: Exam reveals no gallop and no friction rub.   No murmur heard.  Pulmonary/Chest: No stridor. No respiratory distress. He has no wheezes. He has no rales. He exhibits no tenderness.   Abdominal: He exhibits no distension and no mass. There is tenderness in the right lower quadrant. There is no rebound and no guarding. No hernia.   Musculoskeletal: He exhibits no edema or deformity.   Neurological: He is alert and oriented to person, place, and time.   Skin: Skin is warm and dry. He is not diaphoretic.   Nursing note and vitals reviewed.          Significant Labs:   CBC:   Recent Labs   Lab 01/24/20  0838   WBC 15.39*   HGB 15.7   HCT 46.4        CMP:   Recent Labs   Lab 01/24/20  0838   NA  140   K 4.3      CO2 27      BUN 13   CREATININE 1.1   CALCIUM 10.2   PROT 7.8   ALBUMIN 4.0   BILITOT 0.8   ALKPHOS 49*   AST 16   ALT 25   ANIONGAP 11   EGFRNONAA >60       Significant Imaging:     CT of abdomen/pelvix 1/24/2020  Findings highly suspicious for ruptured diverticulitis with a collection of air and minimal fluid seen anterior to the midportion of the sigmoid colon as described above.  No well-defined wall surrounding this collection at this time to suggest abscess.  The possibility of underlying colonic mass is not completely excluded and therefore further evaluation with colonoscopy is recommended after resolution of the acute event.

## 2020-01-24 NOTE — ASSESSMENT & PLAN NOTE
Patient with complicated diverticulitis.  Recommend admit, iv antibiotics, and bowel rest.  Reviewed imaging with Dr. Guerrero. Based on size and clinical picture, should resolved with antibiotics. If patient develops worsening symptoms, may warrant percutaneous drainage by IR.   This was discussed with the patient at bedside. All questions answered.

## 2020-01-24 NOTE — ASSESSMENT & PLAN NOTE
Admit to Med/Surg   Consult General Surgery   NPO   IVF   Cipro and Flagyl given in ED   Continue Zosyn   Pain and nausea control

## 2020-01-25 VITALS
SYSTOLIC BLOOD PRESSURE: 125 MMHG | HEART RATE: 62 BPM | HEIGHT: 74 IN | RESPIRATION RATE: 14 BRPM | BODY MASS INDEX: 28.92 KG/M2 | WEIGHT: 225.31 LBS | TEMPERATURE: 98 F | OXYGEN SATURATION: 94 % | DIASTOLIC BLOOD PRESSURE: 79 MMHG

## 2020-01-25 PROBLEM — K57.20 DIVERTICULITIS OF LARGE INTESTINE WITH PERFORATION WITHOUT ABSCESS OR BLEEDING: Status: RESOLVED | Noted: 2020-01-24 | Resolved: 2020-01-25

## 2020-01-25 LAB
ANION GAP SERPL CALC-SCNC: 10 MMOL/L (ref 8–16)
BASOPHILS # BLD AUTO: 0.02 K/UL (ref 0–0.2)
BASOPHILS NFR BLD: 0.2 % (ref 0–1.9)
BUN SERPL-MCNC: 11 MG/DL (ref 6–20)
CALCIUM SERPL-MCNC: 9.4 MG/DL (ref 8.7–10.5)
CHLORIDE SERPL-SCNC: 106 MMOL/L (ref 95–110)
CO2 SERPL-SCNC: 26 MMOL/L (ref 23–29)
CREAT SERPL-MCNC: 0.9 MG/DL (ref 0.5–1.4)
DIFFERENTIAL METHOD: ABNORMAL
EOSINOPHIL # BLD AUTO: 0.1 K/UL (ref 0–0.5)
EOSINOPHIL NFR BLD: 1.2 % (ref 0–8)
ERYTHROCYTE [DISTWIDTH] IN BLOOD BY AUTOMATED COUNT: 11.6 % (ref 11.5–14.5)
EST. GFR  (AFRICAN AMERICAN): >60 ML/MIN/1.73 M^2
EST. GFR  (NON AFRICAN AMERICAN): >60 ML/MIN/1.73 M^2
GLUCOSE SERPL-MCNC: 110 MG/DL (ref 70–110)
HCT VFR BLD AUTO: 46.6 % (ref 40–54)
HGB BLD-MCNC: 15.3 G/DL (ref 14–18)
IMM GRANULOCYTES # BLD AUTO: 0.05 K/UL (ref 0–0.04)
IMM GRANULOCYTES NFR BLD AUTO: 0.5 % (ref 0–0.5)
LYMPHOCYTES # BLD AUTO: 1.9 K/UL (ref 1–4.8)
LYMPHOCYTES NFR BLD: 18.3 % (ref 18–48)
MCH RBC QN AUTO: 29.5 PG (ref 27–31)
MCHC RBC AUTO-ENTMCNC: 32.8 G/DL (ref 32–36)
MCV RBC AUTO: 90 FL (ref 82–98)
MONOCYTES # BLD AUTO: 0.7 K/UL (ref 0.3–1)
MONOCYTES NFR BLD: 6.6 % (ref 4–15)
NEUTROPHILS # BLD AUTO: 7.5 K/UL (ref 1.8–7.7)
NEUTROPHILS NFR BLD: 73.2 % (ref 38–73)
NRBC BLD-RTO: 0 /100 WBC
PLATELET # BLD AUTO: 269 K/UL (ref 150–350)
PMV BLD AUTO: 10.1 FL (ref 9.2–12.9)
POTASSIUM SERPL-SCNC: 4.3 MMOL/L (ref 3.5–5.1)
RBC # BLD AUTO: 5.19 M/UL (ref 4.6–6.2)
SODIUM SERPL-SCNC: 142 MMOL/L (ref 136–145)
WBC # BLD AUTO: 10.17 K/UL (ref 3.9–12.7)

## 2020-01-25 PROCEDURE — 85025 COMPLETE CBC W/AUTO DIFF WBC: CPT

## 2020-01-25 PROCEDURE — 99232 SBSQ HOSP IP/OBS MODERATE 35: CPT | Mod: ,,, | Performed by: SURGERY

## 2020-01-25 PROCEDURE — 80048 BASIC METABOLIC PNL TOTAL CA: CPT

## 2020-01-25 PROCEDURE — 96376 TX/PRO/DX INJ SAME DRUG ADON: CPT

## 2020-01-25 PROCEDURE — 63600175 PHARM REV CODE 636 W HCPCS: Performed by: NURSE PRACTITIONER

## 2020-01-25 PROCEDURE — 36415 COLL VENOUS BLD VENIPUNCTURE: CPT

## 2020-01-25 PROCEDURE — 99232 PR SUBSEQUENT HOSPITAL CARE,LEVL II: ICD-10-PCS | Mod: ,,, | Performed by: SURGERY

## 2020-01-25 PROCEDURE — G0378 HOSPITAL OBSERVATION PER HR: HCPCS

## 2020-01-25 RX ORDER — CIPROFLOXACIN 500 MG/1
500 TABLET ORAL 2 TIMES DAILY
Qty: 20 TABLET | Refills: 0 | Status: SHIPPED | OUTPATIENT
Start: 2020-01-25 | End: 2020-02-03 | Stop reason: SDUPTHER

## 2020-01-25 RX ORDER — METRONIDAZOLE 500 MG/1
500 TABLET ORAL 3 TIMES DAILY
Qty: 30 TABLET | Refills: 0 | Status: SHIPPED | OUTPATIENT
Start: 2020-01-25 | End: 2020-02-03 | Stop reason: SDUPTHER

## 2020-01-25 RX ADMIN — PIPERACILLIN AND TAZOBACTAM 4.5 G: 4; .5 INJECTION, POWDER, LYOPHILIZED, FOR SOLUTION INTRAVENOUS; PARENTERAL at 07:01

## 2020-01-25 NOTE — HOSPITAL COURSE
Mr Darden is a 54 year old male who presented to [facility] Emergency Room after being referred to [facility] after have CT for evaluation of possible ruptured diverticulum. He was started on IV antibotic and General Surgery consulted. Today, LLQ pain has greatly decrease, WBC have trending down to normal. He was evaluated by General Surgery this AM, ok to transition to PO clear liquid diet and advance as tolerated. He also tolerated regular diet well. OK to discharge from General Surgery standpoint. Will discharge with Flagyl PO and Cipro PO. He will follow up with PCP in one week and Dr Guerrero in one week. He was seen, examined and deem suitable for discharge.

## 2020-01-25 NOTE — PLAN OF CARE
Pt remains free of injury, pt denies pain,IVF/ABX given per MD order no s/s of distress. 24 hour chart check completed. Will continue to monitor.

## 2020-01-25 NOTE — DISCHARGE SUMMARY
[facility]  Hospital Medicine  Discharge Summary      Patient Name: Dimas Darden  MRN: [id number]  Admission Date: 1/24/2020  Hospital Length of Stay: 1 days  Discharge Date and Time: 1/25/2020  2:13 PM  Attending Physician: No att. providers found   Discharging Provider: Yosvany Moseley NP  Primary Care Provider: Angela Payne MD      HPI:   Mr Darden is a 54 year old male with PMHx Diverticulosis who presented to [facility] with complaints of LLQ pain. He was referred from [facility] after having CT scan today for evaluation of possible ruptured diverticulum. Denies associated symptoms of chest pain, shortness of breath, fever, chills, nausea, vomiting or diarrhea. General Surgery consulted, recommend IV antibiotics, and bowel rest. He had been on Cipro and Flagyl at home. He reviewed IV Cipro and IV Flagyl in the Emergency Room. Will continue Zosyn. He is being admitted under the care of Hospital Medicine.     * No surgery found *      Hospital Course:   Mr Darden is a 54 year old male who presented to [facility] Emergency Room after being referred to [facility] after have CT for evaluation of possible ruptured diverticulum. He was started on IV antibotic and General Surgery consulted. Today, LLQ pain has greatly decrease, WBC have trending down to normal. He was evaluated by General Surgery this AM, ok to transition to PO clear liquid diet and advance as tolerated. He also tolerated regular diet well. OK to discharge from General Surgery standpoint. Will discharge with Flagyl PO and Cipro PO. He will follow up with PCP in one week and Dr Guerrero in one week. He was seen, examined and deem suitable for discharge.      Consults:   Consults (From admission, onward)        Status Ordering Provider     Inpatient consult to General Surgery  Once     Provider:  Aleja Barnett MD    Completed TOMMY GLASS          No new Assessment & Plan notes have been filed under this hospital service since the  last note was generated.  Service: Hospital Medicine    Final Active Diagnoses:      Problems Resolved During this Admission:    Diagnosis Date Noted Date Resolved POA    PRINCIPAL PROBLEM:  Diverticulitis of large intestine with perforation without abscess or bleeding [K57.20] 01/24/2020 01/25/2020 Yes    Muscle spasm [M62.838] 10/03/2016 01/25/2020 Yes       Discharged Condition: stable    Disposition: Home or Self Care    Follow Up:  Follow-up Information     Angela Payne MD. Schedule an appointment as soon as possible for a visit in 1 week.    Specialty:  Internal Medicine  Why:  hospital follow up   Contact information:  [address] [place] [address] [address] Jeff [place] [id number]  [phone]             Mat Guerrero MD. Schedule an appointment as soon as possible for a visit in 2 weeks.    Specialty:  Colon and Rectal Surgery  Why:  hospital follow up   Contact information:  [id number] [place] [address] [address] Jeff [address] [id number]  [phone]                 Patient Instructions:      Diet Adult Regular     Notify your health care provider if you experience any of the following:  persistent nausea and vomiting or diarrhea     Notify your health care provider if you experience any of the following:  severe uncontrolled pain     Activity as tolerated       Significant Diagnostic Studies: Labs:   CMP   Recent Labs   Lab 01/24/20  0838 01/25/20  0904    142   K 4.3 4.3    106   CO2 27 26    110   BUN 13 11   CREATININE 1.1 0.9   CALCIUM 10.2 9.4   PROT 7.8  --    ALBUMIN 4.0  --    BILITOT 0.8  --    ALKPHOS 49*  --    AST 16  --    ALT 25  --    ANIONGAP 11 10   ESTGFRAFRICA >60 >60   EGFRNONAA >60 >60    and CBC   Recent Labs   Lab 01/24/20  0838 01/25/20  0904   WBC 15.39* 10.17   HGB 15.7 15.3   HCT 46.4 46.6    269       Pending Diagnostic Studies:     None         Medications:  Reconciled Home Medications:      Medication List      START taking these medications    metroNIDAZOLE 500 MG  tablet  Commonly known as:  FLAGYL  Take 1 tablet (500 mg total) by mouth 3 (three) times daily. for 10 days        CHANGE how you take these medications    ciprofloxacin HCl 500 MG tablet  Commonly known as:  CIPRO  Take 1 tablet (500 mg total) by mouth 2 (two) times daily. for 10 days  What changed:  when to take this        CONTINUE taking these medications    traMADol 50 mg tablet  Commonly known as:  ULTRAM  Take 1 tablet (50 mg total) by mouth every 4 (four) hours as needed.        STOP taking these medications    tiZANidine 4 MG tablet  Commonly known as:  ZANAFLEX            Indwelling Lines/Drains at time of discharge:   Lines/Drains/Airways     None                 Time spent on the discharge of patient: 45 minutes  Patient was seen and examined on the date of discharge and determined to be suitable for discharge.         Yosvany Moseley NP  Department of Hospital Medicine  [facility]

## 2020-01-25 NOTE — SUBJECTIVE & OBJECTIVE
Interval History: No acute issues. Feels great. Denies abdominal pain. Remains afebrile with VSS.     Medications:  Continuous Infusions:   sodium chloride 0.9% 125 mL/hr at 01/24/20 1345     Scheduled Meds:   piperacillin-tazobactam (ZOSYN) IVPB  4.5 g Intravenous Q8H     PRN Meds:ondansetron, promethazine (PHENERGAN) IVPB, traMADol     Review of patient's allergies indicates:  No Known Allergies  Objective:     Vital Signs (Most Recent):  Temp: 98.2 °F (36.8 °C) (01/25/20 0732)  Pulse: 62 (01/25/20 0732)  Resp: 14 (01/25/20 0732)  BP: 125/79 (01/25/20 0732)  SpO2: (!) 94 % (01/25/20 0732) Vital Signs (24h Range):  Temp:  [97.8 °F (36.6 °C)-98.3 °F (36.8 °C)] 98.2 °F (36.8 °C)  Pulse:  [62-81] 62  Resp:  [14-20] 14  SpO2:  [94 %-97 %] 94 %  BP: (125-181)/(79-92) 125/79     Weight: 102.2 kg (225 lb 5 oz)  Body mass index is 28.93 kg/m².    Intake/Output - Last 3 Shifts       01/23 0700 - 01/24 0659 01/24 0700 - 01/25 0659 01/25 0700 - 01/26 0659    I.V. (mL/kg)  1770.8 (17.3)     IV Piggyback  200     Total Intake(mL/kg)  1970.8 (19.3)     Net  +1970.8            Stool Occurrence  1 x 2 x          Physical Exam   Constitutional: He is oriented to person, place, and time. He appears well-developed and well-nourished. No distress.   HENT:   Head: Normocephalic and atraumatic.   Eyes: EOM are normal.   Neck: Neck supple.   Cardiovascular: Normal rate and regular rhythm.   Pulmonary/Chest: Effort normal.   Abdominal: Soft. He exhibits no distension. There is no tenderness.   Musculoskeletal: Normal range of motion.   Neurological: He is alert and oriented to person, place, and time.   Skin: Skin is warm.   Vitals reviewed.      Significant Labs:  CBC:   Recent Labs   Lab 01/25/20  0904   WBC 10.17   RBC 5.19   HGB 15.3   HCT 46.6      MCV 90   MCH 29.5   MCHC 32.8     BMP:   Recent Labs   Lab 01/25/20  0904         K 4.3      CO2 26   BUN 11   CREATININE 0.9   CALCIUM 9.4       Significant  Diagnostics:  I have reviewed and interpreted all pertinent imaging results/findings within the past 24 hours.

## 2020-01-25 NOTE — NURSING
"AVS reviewed with patient. Follow up appointment and d/c medication discussed, D/C medication sent to verified pharmacy. Patient verbalized understanding with teach back. No complaints at this time. No further needs. IV removed per order. Patient tolerated well. To be transported home per self. Patient stated, "I drove myself here, I can drive myself home. I have not had any medication that would make me unsafe."     "

## 2020-01-25 NOTE — ASSESSMENT & PLAN NOTE
Patient with complicated diverticulitis.  Leukocytosis now resolved with iv antibiotics, OK to transition to PO   Clear liquid diet ordered  If remains afebrile and no increased pain with PO intake, OK for discharge from surgical standpoint with f/u with PCP in 1 week and with Dr. Guerrero in 2 weeks

## 2020-01-29 ENCOUNTER — NURSE TRIAGE (OUTPATIENT)
Dept: ADMINISTRATIVE | Facility: CLINIC | Age: 55
End: 2020-01-29

## 2020-01-29 ENCOUNTER — PATIENT OUTREACH (OUTPATIENT)
Dept: ADMINISTRATIVE | Facility: CLINIC | Age: 55
End: 2020-01-29

## 2020-01-29 NOTE — PATIENT INSTRUCTIONS
Discharge Instructions for Diverticulitis  You have been diagnosed with diverticulitis. This is a condition in which small pouches form in your colon (large intestine) and become inflamed or infected. Follow the guidelines below for home care.  As you recover  Tips for recovery include:  · Eat a low-fiber diet. Your healthcare provider may advise a liquid diet. This gives your bowel a chance to rest so that it can recover.  · Foods to include: flake cereal, mashed potatoes, pancakes, waffles, pasta, white bread, rice, applesauce, bananas, eggs, fish, poultry, tofu, and well-cooked vegetables  · Take your medicines as directed. Do not stop taking the medicines, even if you feel better.  · Monitor your temperature and report any rising temperature to your healthcare provider.  · Take antibiotics exactly as directed. Do not miss any and keep taking them even if you feel better.   · Drink 6 to 8 glasses of water every day, unless directed otherwise.  · Use a heating pad or hot water bottle to reduce abdominal cramping or pain.  Preventing diverticulitis in the future  Tips for prevention include:  · Eat a high-fiber diet. Fiber adds bulk to the stool so that it passes through the large intestine more easily.  · Keep drinking 6 to 8 glasses of water every day, unless directed otherwise.  · Begin an exercise program. Ask your healthcare provider how to get started. You can benefit from simple activities such as walking or gardening.  · Treat diarrhea with a bland diet. Start with liquids only, then slowly add fiber over time.  · Watch for changes in your bowel movements (constipation to diarrhea).  · Avoid constipation with fiber and add a stool softener if needed.   · Get plenty of rest and sleep.  Follow-up care  Make a follow-up appointment as directed by our staff.  When to call your healthcare provider  Call your healthcare provider immediately if you have any of the following:  · Fever of 100.4°F (38.0°C) or  higher, or as directed by your healthcare provider  · Chills  · Severe cramps in the belly, most commonly the lower left side  · Tenderness in the belly, most commonly the lower left side  · Nausea and vomiting  · Bleeding from your rectum   Date Last Reviewed: 7/1/2016  © 9208-7217 [employer]. [address] [place] [address], [place] [address]. All rights reserved. This information is not intended as a substitute for professional medical care. Always follow your healthcare professional's instructions.

## 2020-01-29 NOTE — TELEPHONE ENCOUNTER
Transferred to [facility] Miryam Ochoa RN.    Reason for Disposition   Information only question and nurse able to answer    Additional Information   Negative: Nursing judgment   Negative: Nursing judgment   Negative: Nursing judgment   Negative: Nursing judgment    Protocols used: NO PROTOCOL AVAILABLE - INFORMATION ONLY-A-[place]

## 2020-01-29 NOTE — PROGRESS NOTES
C3 nurse attempted to contact patient. No answer. The following message was left for the patient to return the call:  Good afternoon,  I am a nurse calling on behalf of [facility] from the [facility].  This is a Transitional Care Call for Dimas Darden. When you have a moment please contact us at [phone] or [phone] Monday through Friday, between the hours of 8 am to 4 pm. We look forward to speaking with you. On behalf of [facility] have a nice day.    The patient has a scheduled HOSFU appointment with Angela Payne MD on 2/3 @ 0900. Message sent to Physician staff.

## 2020-01-30 DIAGNOSIS — M54.50 CHRONIC LOW BACK PAIN WITHOUT SCIATICA, UNSPECIFIED BACK PAIN LATERALITY: ICD-10-CM

## 2020-01-30 DIAGNOSIS — G89.29 CHRONIC LOW BACK PAIN WITHOUT SCIATICA, UNSPECIFIED BACK PAIN LATERALITY: ICD-10-CM

## 2020-01-30 RX ORDER — TRAMADOL HYDROCHLORIDE 50 MG/1
50 TABLET ORAL EVERY 4 HOURS PRN
Qty: 40 TABLET | Refills: 1 | Status: SHIPPED | OUTPATIENT
Start: 2020-01-30 | End: 2020-02-03 | Stop reason: SDUPTHER

## 2020-02-03 ENCOUNTER — OFFICE VISIT (OUTPATIENT)
Dept: INTERNAL MEDICINE | Facility: CLINIC | Age: 55
End: 2020-02-03
Payer: COMMERCIAL

## 2020-02-03 ENCOUNTER — PATIENT MESSAGE (OUTPATIENT)
Dept: SURGERY | Facility: CLINIC | Age: 55
End: 2020-02-03

## 2020-02-03 VITALS
DIASTOLIC BLOOD PRESSURE: 80 MMHG | SYSTOLIC BLOOD PRESSURE: 126 MMHG | OXYGEN SATURATION: 97 % | TEMPERATURE: 96 F | BODY MASS INDEX: 29.01 KG/M2 | HEART RATE: 65 BPM | WEIGHT: 226 LBS

## 2020-02-03 DIAGNOSIS — M54.50 CHRONIC LOW BACK PAIN WITHOUT SCIATICA, UNSPECIFIED BACK PAIN LATERALITY: ICD-10-CM

## 2020-02-03 DIAGNOSIS — R23.4 SKIN TEXTURE CHANGES: ICD-10-CM

## 2020-02-03 DIAGNOSIS — Z09 HOSPITAL DISCHARGE FOLLOW-UP: Primary | ICD-10-CM

## 2020-02-03 DIAGNOSIS — K57.92 DIVERTICULITIS: ICD-10-CM

## 2020-02-03 DIAGNOSIS — G89.29 CHRONIC LOW BACK PAIN WITHOUT SCIATICA, UNSPECIFIED BACK PAIN LATERALITY: ICD-10-CM

## 2020-02-03 PROCEDURE — 99999 PR PBB SHADOW E&M-EST. PATIENT-LVL III: ICD-10-PCS | Mod: PBBFAC,,, | Performed by: FAMILY MEDICINE

## 2020-02-03 PROCEDURE — 99999 PR PBB SHADOW E&M-EST. PATIENT-LVL III: CPT | Mod: PBBFAC,,, | Performed by: FAMILY MEDICINE

## 2020-02-03 PROCEDURE — 99214 OFFICE O/P EST MOD 30 MIN: CPT | Mod: S$GLB,,, | Performed by: FAMILY MEDICINE

## 2020-02-03 PROCEDURE — 99214 PR OFFICE/OUTPT VISIT, EST, LEVL IV, 30-39 MIN: ICD-10-PCS | Mod: S$GLB,,, | Performed by: FAMILY MEDICINE

## 2020-02-03 RX ORDER — TRAMADOL HYDROCHLORIDE 50 MG/1
50 TABLET ORAL EVERY 4 HOURS PRN
Qty: 40 TABLET | Refills: 1 | Status: SHIPPED | OUTPATIENT
Start: 2020-02-03 | End: 2020-03-18 | Stop reason: SDUPTHER

## 2020-02-03 RX ORDER — CIPROFLOXACIN 500 MG/1
500 TABLET ORAL 2 TIMES DAILY
Qty: 20 TABLET | Refills: 0 | Status: SHIPPED | OUTPATIENT
Start: 2020-02-03 | End: 2020-02-11

## 2020-02-03 RX ORDER — METRONIDAZOLE 500 MG/1
500 TABLET ORAL 3 TIMES DAILY
Qty: 30 TABLET | Refills: 0 | Status: SHIPPED | OUTPATIENT
Start: 2020-02-03 | End: 2020-02-13

## 2020-02-03 NOTE — PROGRESS NOTES
Subjective:       Patient ID: Dimas Darden is a 54 y.o. male.    Chief Complaint: No chief complaint on file.    HPI Mr. Darden presents today for ER follow up.   Seen 1/24/2020 after having a bad episode    Seen by NP CT  1. Findings highly suspicious for ruptured diverticulitis with a collection of air and minimal fluid seen anterior to the midportion of the sigmoid colon as described above.  No well-defined wall surrounding this collection at this time to suggest abscess.  The possibility of underlying colonic mass is not completely excluded and therefore further evaluation with colonoscopy is recommended after resolution of the acute event.  2. Multiple hypodensities noted within the liver most consistent with hepatic cysts.  3. Remaining findings as discussed above.  4.  This report was flagged in Epic as abnormal.    Follow up Surgeon scheduled.   Transitional Care Note    Family and/or Caretaker present at visit?  No.  Diagnostic tests reviewed/disposition: No diagnosic tests pending after this hospitalization.  Disease/illness education: yes  Home health/community services discussion/referrals: Patient does not have home health established from hospital visit.  They do not need home health.  If needed, we will set up home health for the patient.   Establishment or re-establishment of referral orders for community resources: No other necessary community resources.   Discussion with other health care providers: No discussion with other health care providers necessary.           Review of Systems   Constitutional: Negative for activity change, appetite change, fatigue and fever.   HENT: Negative for congestion, ear pain, facial swelling, hearing loss, sore throat and tinnitus.    Eyes: Negative for redness and visual disturbance.   Respiratory: Negative for cough, chest tightness and wheezing.    Gastrointestinal: Positive for abdominal pain. Negative for abdominal distention, constipation, diarrhea, nausea  and vomiting.   Endocrine: Negative for polydipsia and polyuria.   Genitourinary: Negative for discharge, flank pain and frequency.   Musculoskeletal: Positive for back pain. Negative for gait problem and joint swelling.   Skin: Negative for rash.   Neurological: Negative for dizziness, tremors, seizures, weakness and headaches.   Psychiatric/Behavioral: Negative for agitation and confusion.         Past Medical History:   Diagnosis Date    Disc     Bulging disc in back    Diverticulitis      Past Surgical History:   Procedure Laterality Date    UNDESCENDED TESTICLE EXPLORATION Left     Age 15       Objective:        Physical Exam   Constitutional: He is oriented to person, place, and time. He appears well-developed and well-nourished.   HENT:   Head: Normocephalic and atraumatic.   Right Ear: External ear normal.   Left Ear: External ear normal.   Cardiovascular: Normal heart sounds.   Pulmonary/Chest: Breath sounds normal.   Musculoskeletal: Normal range of motion.   Neurological: He is alert and oriented to person, place, and time.   Skin: Skin is warm.   Vitals reviewed.        Results for orders placed or performed during the hospital encounter of 01/24/20   CBC auto differential   Result Value Ref Range    WBC 10.17 3.90 - 12.70 K/uL    RBC 5.19 4.60 - 6.20 M/uL    Hemoglobin 15.3 14.0 - 18.0 g/dL    Hematocrit 46.6 40.0 - 54.0 %    Mean Corpuscular Volume 90 82 - 98 fL    Mean Corpuscular Hemoglobin 29.5 27.0 - 31.0 pg    Mean Corpuscular Hemoglobin Conc 32.8 32.0 - 36.0 g/dL    RDW 11.6 11.5 - 14.5 %    Platelets 269 150 - 350 K/uL    MPV 10.1 9.2 - 12.9 fL    Immature Granulocytes 0.5 0.0 - 0.5 %    Gran # (ANC) 7.5 1.8 - 7.7 K/uL    Immature Grans (Abs) 0.05 (H) 0.00 - 0.04 K/uL    Lymph # 1.9 1.0 - 4.8 K/uL    Mono # 0.7 0.3 - 1.0 K/uL    Eos # 0.1 0.0 - 0.5 K/uL    Baso # 0.02 0.00 - 0.20 K/uL    nRBC 0 0 /100 WBC    Gran% 73.2 (H) 38.0 - 73.0 %    Lymph% 18.3 18.0 - 48.0 %    Mono% 6.6 4.0 - 15.0 %     Eosinophil% 1.2 0.0 - 8.0 %    Basophil% 0.2 0.0 - 1.9 %    Differential Method Automated    Basic metabolic panel   Result Value Ref Range    Sodium 142 136 - 145 mmol/L    Potassium 4.3 3.5 - 5.1 mmol/L    Chloride 106 95 - 110 mmol/L    CO2 26 23 - 29 mmol/L    Glucose 110 70 - 110 mg/dL    BUN, Bld 11 6 - 20 mg/dL    Creatinine 0.9 0.5 - 1.4 mg/dL    Calcium 9.4 8.7 - 10.5 mg/dL    Anion Gap 10 8 - 16 mmol/L    eGFR if African American >60 >60 mL/min/1.73 m^2    eGFR if non African American >60 >60 mL/min/1.73 m^2       Assessment/Plan:     Hospital discharge follow-up  Reviewed ER visit and medication  Patient is still sore but symptoms are improving.   Chronic issue and is scheduled with surgery to discuss further    Chronic low back pain without sciatica, unspecified back pain laterality  -     traMADol (ULTRAM) 50 mg tablet; Take 1 tablet (50 mg total) by mouth every 4 (four) hours as needed.  Dispense: 40 tablet; Refill: 1    Diverticulitis  -     ciprofloxacin HCl (CIPRO) 500 MG tablet; Take 1 tablet (500 mg total) by mouth 2 (two) times daily. for 10 days  Dispense: 20 tablet; Refill: 0  -     metroNIDAZOLE (FLAGYL) 500 MG tablet; Take 1 tablet (500 mg total) by mouth 3 (three) times daily. for 10 days  Dispense: 30 tablet; Refill: 0    Skin texture changes  -     Ambulatory Referral to Dermatology      Follow up as needed     Angela Payne MD  ON   Family Medicine

## 2020-02-05 ENCOUNTER — TELEPHONE (OUTPATIENT)
Dept: INTERNAL MEDICINE | Facility: CLINIC | Age: 55
End: 2020-02-05

## 2020-02-05 NOTE — TELEPHONE ENCOUNTER
----- Message from Lani Aponte sent at 2/5/2020 10:39 AM CST -----  Contact: patient  Type:  Patient Returning Call    Who Called:patient  Who Left Message for Patient:nurse  Does the patient know what this is regarding?:yes,referral  Would the patient rather a call back or a response via [facility]? Call back   Best Call Back Number:[phone]  Additional Information: n/a

## 2020-02-11 ENCOUNTER — OFFICE VISIT (OUTPATIENT)
Dept: DERMATOLOGY | Facility: CLINIC | Age: 55
End: 2020-02-11
Payer: COMMERCIAL

## 2020-02-11 ENCOUNTER — PATIENT MESSAGE (OUTPATIENT)
Dept: INTERNAL MEDICINE | Facility: CLINIC | Age: 55
End: 2020-02-11

## 2020-02-11 DIAGNOSIS — L98.9 DISEASE OF SKIN AND SUBCUTANEOUS TISSUE: Primary | ICD-10-CM

## 2020-02-11 PROCEDURE — 99202 PR OFFICE/OUTPT VISIT, NEW, LEVL II, 15-29 MIN: ICD-10-PCS | Mod: S$GLB,,, | Performed by: DERMATOLOGY

## 2020-02-11 PROCEDURE — 99202 OFFICE O/P NEW SF 15 MIN: CPT | Mod: S$GLB,,, | Performed by: DERMATOLOGY

## 2020-02-11 PROCEDURE — 99999 PR PBB SHADOW E&M-EST. PATIENT-LVL II: ICD-10-PCS | Mod: PBBFAC,,, | Performed by: DERMATOLOGY

## 2020-02-11 PROCEDURE — 99999 PR PBB SHADOW E&M-EST. PATIENT-LVL II: CPT | Mod: PBBFAC,,, | Performed by: DERMATOLOGY

## 2020-02-11 RX ORDER — TRIAMCINOLONE ACETONIDE 1 MG/G
CREAM TOPICAL 2 TIMES DAILY
Qty: 454 G | Refills: 0 | Status: SHIPPED | OUTPATIENT
Start: 2020-02-11 | End: 2021-05-08

## 2020-02-11 RX ORDER — CIPROFLOXACIN 500 MG/1
500 TABLET ORAL
COMMUNITY
Start: 2020-01-20 | End: 2020-07-13

## 2020-02-11 RX ORDER — METRONIDAZOLE 500 MG/1
500 TABLET ORAL
COMMUNITY
Start: 2020-01-20 | End: 2020-02-11

## 2020-02-11 NOTE — PROGRESS NOTES
Subjective:       Patient ID:  Dimas Darden is a 54 y.o. male who presents for   Chief Complaint   Patient presents with    Rash     X 3 days itchy to chest and neck    Acne     X 1 yrs growing to face      History of Present Illness: The patient presents with chief complaint of rash.  Location: chest, neck, back  Duration: 3 days  Signs/Symptoms: itchy red bumps    Prior treatments: none    Recent flare of diverticulitis and finished 10 day course of cipro and flagyl last Monday  IV abx for one night hospitalization for diverticulitis 1/24/2020    Since last Monday, only thing he has taken is Tramadol    Current Skin Care Regimen  Soap: Dial  Moisturizer: not using  Detergent:  Last week changed from All free and clear to Tide (before rash) - reports a reaction to laundry detergent in the past when he got it on his hands and they got very red and swollen  Fabric softener: unsure  Colognes/Perfumes/Fragrances: none  Bathing: BID    Patient complains of lesion(s)  Location: face  Duration: many months  Symptoms: irritating and growing  Relieving factors/Previous treatments: none          Review of Systems   Constitutional: Negative for fever.   Genitourinary: Negative for hematuria.   Skin: Positive for itching and rash.        Objective:    Physical Exam   Constitutional: He appears well-developed and well-nourished. No distress.   Neurological: He is alert and oriented to person, place, and time. He is not disoriented.   Psychiatric: He has a normal mood and affect.   Skin:   Areas Examined (abnormalities noted in diagram):   Scalp / Hair Palpated and Inspected  Head / Face Inspection Performed  Neck Inspection Performed  Chest / Axilla Inspection Performed  Abdomen Inspection Performed  Back Inspection Performed  RUE Inspected  LUE Inspection Performed                   Diagram Legend     Erythematous scaling macule/papule c/w actinic keratosis       Vascular papule c/w angioma      Pigmented verrucoid  papule/plaque c/w seborrheic keratosis      Yellow umbilicated papule c/w sebaceous hyperplasia      Irregularly shaped tan macule c/w lentigo     1-2 mm smooth white papules consistent with Milia      Movable subcutaneous cyst with punctum c/w epidermal inclusion cyst      Subcutaneous movable cyst c/w pilar cyst      Firm pink to brown papule c/w dermatofibroma      Pedunculated fleshy papule(s) c/w skin tag(s)      Evenly pigmented macule c/w junctional nevus     Mildly variegated pigmented, slightly irregular-bordered macule c/w mildly atypical nevus      Flesh colored to evenly pigmented papule c/w intradermal nevus       Pink pearly papule/plaque c/w basal cell carcinoma      Erythematous hyperkeratotic cursted plaque c/w SCC      Surgical scar with no sign of skin cancer recurrence      Open and closed comedones      Inflammatory papules and pustules      Verrucoid papule consistent consistent with wart     Erythematous eczematous patches and plaques     Dystrophic onycholytic nail with subungual debris c/w onychomycosis     Umbilicated papule    Erythematous-base heme-crusted tan verrucoid plaque consistent with inflamed seborrheic keratosis     Erythematous Silvery Scaling Plaque c/w Psoriasis     See annotation      Assessment / Plan:        Disease of skin and subcutaneous tissue  - suspect contact dermatitis 2/2 laundry detergent - change back to All Free and Clear  - discussed management options. Hesitant to give systemic steroids in setting of recent perforated diverticulitis and patient reports potential upcoming surgery. Discussed that we will try topicals first and he can discuss with his surgeon (sees them tomorrow) if systemic steroid taper would be ok if topical steroid does not help  -     triamcinolone acetonide 0.1% (KENALOG) 0.1 % cream; Apply topically 2 (two) times daily. To affected areas for up to 2-4 weeks  Dispense: 454 g; Refill: 0    Sebaceous hyperplasia  This is a common condition  representing benign enlargement of the sebaceous lobule. It typically occurs in adulthood. Reassurance given to patient.   Pt concerned about lesion to central forehead, growing. Discussed biopsy to r/o BCC- will wait until after acute rash has been managed         Follow up in about 2 weeks (around 2/25/2020) for 2-3 weeks.

## 2020-02-12 ENCOUNTER — OFFICE VISIT (OUTPATIENT)
Dept: SURGERY | Facility: CLINIC | Age: 55
End: 2020-02-12
Payer: COMMERCIAL

## 2020-02-12 VITALS — BODY MASS INDEX: 29.12 KG/M2 | HEIGHT: 74 IN | WEIGHT: 226.88 LBS

## 2020-02-12 DIAGNOSIS — K57.20 DIVERTICULITIS OF LARGE INTESTINE WITH PERFORATION WITHOUT BLEEDING: Primary | ICD-10-CM

## 2020-02-12 DIAGNOSIS — Z12.11 SCREEN FOR COLON CANCER: ICD-10-CM

## 2020-02-12 PROCEDURE — 99204 PR OFFICE/OUTPT VISIT, NEW, LEVL IV, 45-59 MIN: ICD-10-PCS | Mod: S$GLB,,, | Performed by: COLON & RECTAL SURGERY

## 2020-02-12 PROCEDURE — 99999 PR PBB SHADOW E&M-EST. PATIENT-LVL IV: ICD-10-PCS | Mod: PBBFAC,,, | Performed by: COLON & RECTAL SURGERY

## 2020-02-12 PROCEDURE — 99999 PR PBB SHADOW E&M-EST. PATIENT-LVL IV: CPT | Mod: PBBFAC,,, | Performed by: COLON & RECTAL SURGERY

## 2020-02-12 PROCEDURE — 99204 OFFICE O/P NEW MOD 45 MIN: CPT | Mod: S$GLB,,, | Performed by: COLON & RECTAL SURGERY

## 2020-02-12 RX ORDER — METRONIDAZOLE 500 MG/1
500 TABLET ORAL 3 TIMES DAILY
Qty: 3 TABLET | Refills: 0 | Status: SHIPPED | OUTPATIENT
Start: 2020-02-12 | End: 2020-07-31

## 2020-02-12 RX ORDER — HEPARIN SODIUM 5000 [USP'U]/ML
5000 INJECTION, SOLUTION INTRAVENOUS; SUBCUTANEOUS
Status: CANCELLED | OUTPATIENT
Start: 2020-02-12 | End: 2020-02-13

## 2020-02-12 RX ORDER — CELECOXIB 100 MG/1
400 CAPSULE ORAL
Status: CANCELLED | OUTPATIENT
Start: 2020-02-12 | End: 2020-02-12

## 2020-02-12 RX ORDER — LIDOCAINE HYDROCHLORIDE 10 MG/ML
1 INJECTION, SOLUTION EPIDURAL; INFILTRATION; INTRACAUDAL; PERINEURAL ONCE
Status: DISCONTINUED | OUTPATIENT
Start: 2020-02-12 | End: 2022-07-15

## 2020-02-12 RX ORDER — METRONIDAZOLE 500 MG/100ML
500 INJECTION, SOLUTION INTRAVENOUS
Status: CANCELLED | OUTPATIENT
Start: 2020-02-12

## 2020-02-12 RX ORDER — SODIUM, POTASSIUM,MAG SULFATES 17.5-3.13G
1 SOLUTION, RECONSTITUTED, ORAL ORAL DAILY
Qty: 1 KIT | Refills: 0 | Status: SHIPPED | OUTPATIENT
Start: 2020-02-12 | End: 2020-02-14

## 2020-02-12 RX ORDER — SODIUM CHLORIDE, SODIUM LACTATE, POTASSIUM CHLORIDE, CALCIUM CHLORIDE 600; 310; 30; 20 MG/100ML; MG/100ML; MG/100ML; MG/100ML
INJECTION, SOLUTION INTRAVENOUS CONTINUOUS
Status: CANCELLED | OUTPATIENT
Start: 2020-02-12

## 2020-02-12 RX ORDER — ONDANSETRON 2 MG/ML
4 INJECTION INTRAMUSCULAR; INTRAVENOUS EVERY 12 HOURS PRN
Status: CANCELLED | OUTPATIENT
Start: 2020-02-12

## 2020-02-12 RX ORDER — NEOMYCIN SULFATE 500 MG/1
1000 TABLET ORAL 3 TIMES DAILY
Qty: 6 TABLET | Refills: 0 | Status: ON HOLD | OUTPATIENT
Start: 2020-02-12 | End: 2021-06-08

## 2020-02-12 RX ORDER — GABAPENTIN 100 MG/1
800 CAPSULE ORAL
Status: CANCELLED | OUTPATIENT
Start: 2020-02-12 | End: 2020-02-12

## 2020-02-12 RX ORDER — POLYETHYLENE GLYCOL 3350 17 G/17G
238 POWDER, FOR SOLUTION ORAL DAILY
Qty: 1 BOTTLE | Refills: 0 | Status: ON HOLD | OUTPATIENT
Start: 2020-02-12 | End: 2021-06-08

## 2020-02-12 RX ORDER — ACETAMINOPHEN 325 MG/1
1000 TABLET ORAL ONCE
Status: CANCELLED | OUTPATIENT
Start: 2020-02-12 | End: 2020-02-12

## 2020-02-12 NOTE — PROGRESS NOTES
History & Physical    SUBJECTIVE:     CC: Diverticulitis  Ref MD: Aleja Barnett MD    History of Present Illness:  Patient is a 54 y.o. male presents for evaluation of diverticulitis.  Patient was recently admitted to the hospital 2-3 weeks ago with perforated sigmoid diverticulitis.  He did not require any percutaneous drainage procedure and resolved with IV antibiotics that were covered p.o. antibiotics and he has now completed an outpatient course of p.o. antibiotics and his symptoms have completely resolved.  Patient states that he initially had uncomplicated sigmoid diverticulitis around 3 years ago that did require visit to the hospital was treated with p.o. antibiotics.  He has had about every 6 months recurrent bouts of sigmoid diverticulitis that have not required hospitalization or imaging that he takes outpatient antibiotics for, but this most recent attack was more severe than any of his previous attacks.  He states that he is having no fever, chills, nausea, vomiting, diarrhea, constipation.  He has never had a colonoscopy.  He has no personal family history of colorectal cancer.  He is interested in surgical resection of this area. I have reviewed his past medical records and imaging as well spoken with his referring provider.    Review of patient's allergies indicates:  No Known Allergies    Current Outpatient Medications   Medication Sig Dispense Refill    ciprofloxacin HCl (CIPRO) 500 MG tablet Take 500 mg by mouth.      metroNIDAZOLE (FLAGYL) 500 MG tablet Take 1 tablet (500 mg total) by mouth 3 (three) times daily. for 10 days 30 tablet 0    traMADol (ULTRAM) 50 mg tablet Take 1 tablet (50 mg total) by mouth every 4 (four) hours as needed. 40 tablet 1    triamcinolone acetonide 0.1% (KENALOG) 0.1 % cream Apply topically 2 (two) times daily. To affected areas for up to 2-4 weeks 454 g 0    metroNIDAZOLE (FLAGYL) 500 MG tablet Take 1 tablet (500 mg total) by mouth 3 (three) times daily. Take  initial dose@2pm, second dose@3pm and final dose@10pm day before surgery 3 tablet 0    neomycin (MYCIFRADIN) 500 mg Tab Take 2 tablets (1,000 mg total) by mouth 3 (three) times daily. Take 1st dose@2pm,take 2nd dose@3pm, take last dose@10pm day before surgery 6 tablet 0    polyethylene glycol (GLYCOLAX) 17 gram/dose powder Take 238 g by mouth once daily. Mix with 2L of gatorade, drink all mixed solution starting@12pm day before surgery, finish by 10pm 1 Bottle 0    sodium,potassium,mag sulfates (SUPREP BOWEL PREP KIT) 17.5-3.13-1.6 gram SolR Take 177 mLs by mouth once daily. for 2 days 1 kit 0     Current Facility-Administered Medications   Medication Dose Route Frequency Provider Last Rate Last Dose    lidocaine (PF) 10 mg/ml (1%) injection 10 mg  1 mL Intradermal Once Mat Guerrero MD           Past Medical History:   Diagnosis Date    Disc     Bulging disc in back    Diverticulitis      Past Surgical History:   Procedure Laterality Date    UNDESCENDED TESTICLE EXPLORATION Left     Age 15     History reviewed. No pertinent family history.  Social History     Tobacco Use    Smoking status: Former Smoker     Packs/day: 1.00     Years: 25.00     Pack years: 25.00     Last attempt to quit: 4/10/2012     Years since quittin.8    Smokeless tobacco: Never Used   Substance Use Topics    Alcohol use: Yes     Comment: socially    Drug use: No        Review of Systems:  Review of Systems   Constitutional: Negative for activity change, appetite change, chills, fatigue, fever and unexpected weight change.   HENT: Negative for congestion, ear pain, sore throat and trouble swallowing.    Eyes: Negative for pain, redness and itching.   Respiratory: Negative for cough, shortness of breath and wheezing.    Cardiovascular: Negative for chest pain, palpitations and leg swelling.   Gastrointestinal: Negative for abdominal distention, abdominal pain, anal bleeding, blood in stool, constipation, diarrhea, nausea,  "rectal pain and vomiting.   Endocrine: Negative for cold intolerance, heat intolerance and polyuria.   Genitourinary: Negative for dysuria, flank pain, frequency and hematuria.   Musculoskeletal: Negative for gait problem, joint swelling and neck pain.   Skin: Negative for color change, rash and wound.   Allergic/Immunologic: Negative for environmental allergies and immunocompromised state.   Neurological: Negative for dizziness, speech difficulty, weakness and numbness.   Psychiatric/Behavioral: Negative for agitation, confusion and hallucinations.       OBJECTIVE:     Vital Signs (Most Recent)     6' 2" (1.88 m)  102.9 kg (226 lb 13.7 oz)     Physical Exam:  Physical Exam   Constitutional: He is oriented to person, place, and time. He appears well-developed.   HENT:   Head: Normocephalic and atraumatic.   Eyes: Conjunctivae and EOM are normal.   Neck: Normal range of motion. No thyromegaly present.   Cardiovascular: Normal rate and regular rhythm.   Pulmonary/Chest: Effort normal. No respiratory distress.   Abdominal: Soft. He exhibits no distension and no mass. There is no tenderness.   Well-healed L inguinal incision   Musculoskeletal: Normal range of motion. He exhibits no edema or tenderness.   Neurological: He is alert and oriented to person, place, and time.   Skin: Skin is warm and dry. Capillary refill takes less than 2 seconds. No rash noted.   Psychiatric: He has a normal mood and affect.       Laboratory  Lab Results   Component Value Date    WBC 10.17 01/25/2020    HGB 15.3 01/25/2020    HCT 46.6 01/25/2020     01/25/2020    CHOL 170 03/31/2017    TRIG 143 03/31/2017    HDL 50 03/31/2017    ALT 25 01/24/2020    AST 16 01/24/2020     01/25/2020    K 4.3 01/25/2020     01/25/2020    CREATININE 0.9 01/25/2020    BUN 11 01/25/2020    CO2 26 01/25/2020    INR 1.0 12/27/2016       Results for orders placed during the hospital encounter of 01/24/20   CT Abdomen Pelvis W Wo Contrast    " Narrative EXAMINATION:  CT ABDOMEN PELVIS W WO CONTRAST    CLINICAL HISTORY:  Abd pain, fever, abscess suspected;Left lower quadrant pain    TECHNIQUE:  Low dose axial images, sagittal and coronal reformations were obtained from the lung bases to the pubic symphysis before and following the IV administration of 100 mL of Omnipaque 350.  30 mL of oral Omnipaque 350 was also administered.    COMPARISON:  None    FINDINGS:  ABDOMEN    Lung bases: There are dependent changes noted within the bilateral lung bases.    Liver/gallbladder/biliary: There are multiple nonenhancing lesion seen scattered throughout both lobes of the liver including the caudate lobe most consistent with cysts.  The gallbladder is present and unremarkable.No biliary ductal dilation.    Pancreas: The pancreas is unremarkable in appearance.    Spleen: The spleen is not enlarged.    Adrenals: Unremarkable    Kidneys: The kidneys are equally perfused and demonstrate no solid masses. No nephrolithiasis.    Bowel/Mesentery: There is circumferential wall thickening associated with a fairly long segment of the mid sigmoid colon which measures approximately 6.5 cm in length.  There are diverticula noted in this region along with a pocket of some fluid and free air seen just anterior to this portion of the colon that measures approximately 2.7 cm and is suspicious for ruptured diverticulitis.  No well-defined fluid collections noted at this time.  There is inflammatory stranding seen surrounding this segment of the colon.  The possibility of an underlying colonic mass is not completely excluded and therefore follow-up with colonoscopy after the resolution of the acute event is recommended.    Retroperitoneum: No adenopathy.A retroaortic left renal vein is noted.    PELVIS    Genitourinary/Reproductive organs: Unremarkable    Adenopathy: None    Free Fluid: No free fluid    Osseus Structures/Soft tissues: No suspicious appearing osseus lesions. No  significant soft tissue abnormality.      Impression 1. Findings highly suspicious for ruptured diverticulitis with a collection of air and minimal fluid seen anterior to the midportion of the sigmoid colon as described above.  No well-defined wall surrounding this collection at this time to suggest abscess.  The possibility of underlying colonic mass is not completely excluded and therefore further evaluation with colonoscopy is recommended after resolution of the acute event.  2. Multiple hypodensities noted within the liver most consistent with hepatic cysts.  3. Remaining findings as discussed above.  4.  This report was flagged in Epic as abnormal.      Electronically signed by: Yordy Patterson DO  Date:    01/24/2020  Time:    10:36         Diagnostic Results:  CT: Reviewed    ASSESSMENT/PLAN:     53yo M with recurrent sigmoid diverticulitis with recent attack that was perforated diverticulitis requiring inpatient admission and IV antibiotics but no percutaneous drainage    - long discussion with the patient regarding his nature disease as well as his perforated diverticulitis recently.  Discussed that we should likely proceed with surgical intervention to remove this portion of his colon given the recent perforation.  After discussion of the risks, benefits and alternatives, he is agreeable to proceed with surgical intervention.  - we discussed that he should undergo colonoscopy prior to surgical intervention to evaluate the area as well as to completely rule out additional lesions distally and proximally in the colon and rectum.  Will perform the 6 weeks from his attack of diverticulitis. Will give Suprep  - assuming workup is negative, we will proceed with laparoscopic versus open sigmoid colectomy 2 weeks following his colonoscopy.  - All risks, benefits and alternatives fully explained to patient. Risks include, but are not limited to, bleeding, infection, anastomotic leak, damage to ureter, damage to  other intra-abdominal organs such as colon, rectum, small bowel, stomach, liver, bladder, reproductive organs, sexual dysfunction, urinary dysfunction, postoperative abscess, conversion to open operation, perioperative MI, CVA and death.  All questions field and appropriately answered to patient's satisfaction.  Consent signed and placed on chart.  - mechanical and oral antibiotic bowel prep prior to surgical intervention  - ERAS protocol. Discussed with patient.  - RTC postop    Mat Guerrero MD  Colon and Rectal Surgery  [facility]

## 2020-02-17 ENCOUNTER — PATIENT MESSAGE (OUTPATIENT)
Dept: INTERNAL MEDICINE | Facility: CLINIC | Age: 55
End: 2020-02-17

## 2020-02-17 ENCOUNTER — TELEPHONE (OUTPATIENT)
Dept: ADMINISTRATIVE | Facility: HOSPITAL | Age: 55
End: 2020-02-17

## 2020-02-17 DIAGNOSIS — R45.4 IRRITABILITY AND ANGER: Primary | ICD-10-CM

## 2020-03-04 ENCOUNTER — HOSPITAL ENCOUNTER (OUTPATIENT)
Dept: RADIOLOGY | Facility: HOSPITAL | Age: 55
Discharge: HOME | End: 2020-03-04
Attending: COLON & RECTAL SURGERY
Payer: COMMERCIAL

## 2020-03-04 ENCOUNTER — HOSPITAL ENCOUNTER (OUTPATIENT)
Dept: CARDIOLOGY | Facility: HOSPITAL | Age: 55
Discharge: HOME | End: 2020-03-04
Attending: COLON & RECTAL SURGERY
Payer: COMMERCIAL

## 2020-03-04 DIAGNOSIS — Z12.11 SCREEN FOR COLON CANCER: ICD-10-CM

## 2020-03-04 DIAGNOSIS — K57.20 DIVERTICULITIS OF LARGE INTESTINE WITH PERFORATION WITHOUT BLEEDING: ICD-10-CM

## 2020-03-04 PROCEDURE — 93010 ELECTROCARDIOGRAM REPORT: CPT | Mod: ,,, | Performed by: INTERNAL MEDICINE

## 2020-03-04 PROCEDURE — 71045 X-RAY EXAM CHEST 1 VIEW: CPT | Mod: TC

## 2020-03-04 PROCEDURE — 93005 ELECTROCARDIOGRAM TRACING: CPT

## 2020-03-04 PROCEDURE — 93010 EKG 12-LEAD: ICD-10-PCS | Mod: ,,, | Performed by: INTERNAL MEDICINE

## 2020-03-04 PROCEDURE — 71045 XR CHEST 1 VIEW: ICD-10-PCS | Mod: 26,,, | Performed by: RADIOLOGY

## 2020-03-04 PROCEDURE — 71045 X-RAY EXAM CHEST 1 VIEW: CPT | Mod: 26,,, | Performed by: RADIOLOGY

## 2020-03-05 ENCOUNTER — OFFICE VISIT (OUTPATIENT)
Dept: PSYCHIATRY | Facility: CLINIC | Age: 55
End: 2020-03-05
Payer: COMMERCIAL

## 2020-03-05 DIAGNOSIS — F43.22 ADJUSTMENT DISORDER WITH ANXIETY: ICD-10-CM

## 2020-03-05 PROCEDURE — 99999 PR PBB SHADOW E&M-EST. PATIENT-LVL I: CPT | Mod: PBBFAC,,, | Performed by: SOCIAL WORKER

## 2020-03-05 PROCEDURE — 99999 PR PBB SHADOW E&M-EST. PATIENT-LVL I: ICD-10-PCS | Mod: PBBFAC,,, | Performed by: SOCIAL WORKER

## 2020-03-05 PROCEDURE — 90791 PR PSYCHIATRIC DIAGNOSTIC EVALUATION: ICD-10-PCS | Mod: S$GLB,,, | Performed by: SOCIAL WORKER

## 2020-03-05 PROCEDURE — 90791 PSYCH DIAGNOSTIC EVALUATION: CPT | Mod: S$GLB,,, | Performed by: SOCIAL WORKER

## 2020-03-05 NOTE — LETTER
March 5, 2020        Angela Payne MD  [address] [place] [address]             [facility]  [address]  [place] [address]  Phone: [phone]  Fax: [phone]   Patient: Dimas Darden   MR Number: [id number]   YOB: 1965   Date of Visit: 3/5/2020       Dear Dr. Payne:    Thank you for referring Dimas Darden to me for evaluation. Please see the initial evaluation for the relevant portions of my assessment and plan of care.    If you have questions, please do not hesitate to call me. I look forward to following Dimas along with you.    Sincerely,      Tho Patel, JENA           CC  No Recipients
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
## 2020-03-06 ENCOUNTER — PATIENT MESSAGE (OUTPATIENT)
Dept: INTERNAL MEDICINE | Facility: CLINIC | Age: 55
End: 2020-03-06

## 2020-03-06 RX ORDER — BUPROPION HYDROCHLORIDE 100 MG/1
100 TABLET ORAL 2 TIMES DAILY
Qty: 180 TABLET | Refills: 0 | Status: SHIPPED | OUTPATIENT
Start: 2020-03-06 | End: 2020-06-29 | Stop reason: SDUPTHER

## 2020-03-09 ENCOUNTER — PATIENT MESSAGE (OUTPATIENT)
Dept: ENDOSCOPY | Facility: HOSPITAL | Age: 55
End: 2020-03-09

## 2020-03-09 ENCOUNTER — PATIENT OUTREACH (OUTPATIENT)
Dept: ADMINISTRATIVE | Facility: OTHER | Age: 55
End: 2020-03-09

## 2020-03-09 ENCOUNTER — PATIENT MESSAGE (OUTPATIENT)
Dept: SURGERY | Facility: CLINIC | Age: 55
End: 2020-03-09

## 2020-03-09 ENCOUNTER — OFFICE VISIT (OUTPATIENT)
Dept: CARDIOLOGY | Facility: CLINIC | Age: 55
End: 2020-03-09
Payer: COMMERCIAL

## 2020-03-09 ENCOUNTER — PATIENT MESSAGE (OUTPATIENT)
Dept: INTERNAL MEDICINE | Facility: CLINIC | Age: 55
End: 2020-03-09

## 2020-03-09 VITALS
DIASTOLIC BLOOD PRESSURE: 88 MMHG | OXYGEN SATURATION: 98 % | HEART RATE: 84 BPM | BODY MASS INDEX: 29.18 KG/M2 | SYSTOLIC BLOOD PRESSURE: 134 MMHG | WEIGHT: 227.31 LBS

## 2020-03-09 DIAGNOSIS — R94.31 EKG ABNORMALITIES: ICD-10-CM

## 2020-03-09 DIAGNOSIS — Z01.810 PREOP CARDIOVASCULAR EXAM: Primary | ICD-10-CM

## 2020-03-09 DIAGNOSIS — R94.31 ABNORMAL EKG: Primary | ICD-10-CM

## 2020-03-09 PROCEDURE — 99999 PR PBB SHADOW E&M-EST. PATIENT-LVL II: ICD-10-PCS | Mod: PBBFAC,,, | Performed by: INTERNAL MEDICINE

## 2020-03-09 PROCEDURE — 99204 OFFICE O/P NEW MOD 45 MIN: CPT | Mod: S$GLB,,, | Performed by: INTERNAL MEDICINE

## 2020-03-09 PROCEDURE — 99999 PR PBB SHADOW E&M-EST. PATIENT-LVL II: CPT | Mod: PBBFAC,,, | Performed by: INTERNAL MEDICINE

## 2020-03-09 PROCEDURE — 99204 PR OFFICE/OUTPT VISIT, NEW, LEVL IV, 45-59 MIN: ICD-10-PCS | Mod: S$GLB,,, | Performed by: INTERNAL MEDICINE
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
## 2020-03-09 NOTE — PROGRESS NOTES
Care Everywhere: updated  Immunization: no profile in links, updated  Health Maintenance: updated  Media Review: n/a  Legacy Review: n/a  Order placed: n/a  Upcoming appts: Colonoscopy 3.13.2020

## 2020-03-10 ENCOUNTER — HOSPITAL ENCOUNTER (OUTPATIENT)
Dept: CARDIOLOGY | Facility: HOSPITAL | Age: 55
Discharge: HOME | End: 2020-03-10
Attending: INTERNAL MEDICINE
Payer: COMMERCIAL

## 2020-03-10 VITALS
DIASTOLIC BLOOD PRESSURE: 100 MMHG | HEIGHT: 74 IN | WEIGHT: 227 LBS | BODY MASS INDEX: 29.13 KG/M2 | HEART RATE: 80 BPM | SYSTOLIC BLOOD PRESSURE: 141 MMHG

## 2020-03-10 DIAGNOSIS — R94.31 EKG ABNORMALITIES: ICD-10-CM

## 2020-03-10 DIAGNOSIS — Z01.810 PREOP CARDIOVASCULAR EXAM: ICD-10-CM

## 2020-03-10 LAB
AORTIC ROOT ANNULUS: 3.96 CM
AV INDEX (PROSTH): 0.85
AV MEAN GRADIENT: 3 MMHG
AV PEAK GRADIENT: 5 MMHG
AV VALVE AREA: 3.21 CM2
AV VELOCITY RATIO: 0.85
BSA FOR ECHO PROCEDURE: 2.32 M2
CV ECHO LV RWT: 0.44 CM
CV STRESS BASE HR: 80 BPM
DIASTOLIC BLOOD PRESSURE: 100 MMHG
DOP CALC AO PEAK VEL: 1.1 M/S
DOP CALC AO VTI: 20.41 CM
DOP CALC LVOT AREA: 3.8 CM2
DOP CALC LVOT DIAMETER: 2.2 CM
DOP CALC LVOT PEAK VEL: 0.93 M/S
DOP CALC LVOT STROKE VOLUME: 65.54 CM3
DOP CALCLVOT PEAK VEL VTI: 17.25 CM
E WAVE DECELERATION TIME: 285.86 MSEC
E/A RATIO: 0.7
E/E' RATIO: 8.33 M/S
ECHO LV POSTERIOR WALL: 1.14 CM (ref 0.6–1.1)
FRACTIONAL SHORTENING: 36 % (ref 28–44)
INTERVENTRICULAR SEPTUM: 1.37 CM (ref 0.6–1.1)
IVRT: 131.49 MSEC
LA MAJOR: 4.05 CM
LA MINOR: 4.48 CM
LA WIDTH: 3.66 CM
LEFT ATRIUM SIZE: 4.13 CM
LEFT ATRIUM VOLUME INDEX: 23.8 ML/M2
LEFT ATRIUM VOLUME: 54.66 CM3
LEFT INTERNAL DIMENSION IN SYSTOLE: 3.31 CM (ref 2.1–4)
LEFT VENTRICLE DIASTOLIC VOLUME INDEX: 56.42 ML/M2
LEFT VENTRICLE DIASTOLIC VOLUME: 129.39 ML
LEFT VENTRICLE MASS INDEX: 116 G/M2
LEFT VENTRICLE SYSTOLIC VOLUME INDEX: 19.4 ML/M2
LEFT VENTRICLE SYSTOLIC VOLUME: 44.47 ML
LEFT VENTRICULAR INTERNAL DIMENSION IN DIASTOLE: 5.2 CM (ref 3.5–6)
LEFT VENTRICULAR MASS: 264.93 G
LV LATERAL E/E' RATIO: 7.14 M/S
LV SEPTAL E/E' RATIO: 10 M/S
MV PEAK A VEL: 0.71 M/S
MV PEAK E VEL: 0.5 M/S
OHS CV CPX 1 MINUTE RECOVERY HEART RATE: 129 BPM
OHS CV CPX 85 PERCENT MAX PREDICTED HEART RATE MALE: 142
OHS CV CPX ESTIMATED METS: 10
OHS CV CPX MAX PREDICTED HEART RATE: 167
OHS CV CPX PATIENT IS FEMALE: 0
OHS CV CPX PATIENT IS MALE: 1
OHS CV CPX PEAK DIASTOLIC BLOOD PRESSURE: 89 MMHG
OHS CV CPX PEAK HEAR RATE: 173 BPM
OHS CV CPX PEAK RATE PRESSURE PRODUCT: ABNORMAL
OHS CV CPX PEAK SYSTOLIC BLOOD PRESSURE: 182 MMHG
OHS CV CPX PERCENT MAX PREDICTED HEART RATE ACHIEVED: 104
OHS CV CPX RATE PRESSURE PRODUCT PRESENTING: ABNORMAL
PISA TR MAX VEL: 2.3 M/S
PV PEAK VELOCITY: 0.99 CM/S
RA MAJOR: 4.58 CM
RA PRESSURE: 3 MMHG
RA WIDTH: 3.12 CM
RIGHT VENTRICULAR END-DIASTOLIC DIMENSION: 3.89 CM
SINUS: 3.54 CM
STJ: 3.53 CM
STRESS ANGINA INDEX: 0
STRESS ECHO POST EXERCISE DUR MIN: 9 MINUTES
STRESS ECHO POST EXERCISE DUR SEC: 1 SECONDS
SYSTOLIC BLOOD PRESSURE: 141 MMHG
TDI LATERAL: 0.07 M/S
TDI SEPTAL: 0.05 M/S
TDI: 0.06 M/S
TR MAX PG: 21 MMHG
TRICUSPID ANNULAR PLANE SYSTOLIC EXCURSION: 1.95 CM
TV REST PULMONARY ARTERY PRESSURE: 24 MMHG

## 2020-03-10 PROCEDURE — 93351 STRESS TTE COMPLETE: CPT | Mod: 26,,, | Performed by: INTERNAL MEDICINE

## 2020-03-10 PROCEDURE — 93351 STRESS ECHO (CUPID ONLY): ICD-10-PCS | Mod: 26,,, | Performed by: INTERNAL MEDICINE

## 2020-03-10 PROCEDURE — 93351 STRESS TTE COMPLETE: CPT

## 2020-03-11 ENCOUNTER — TELEPHONE (OUTPATIENT)
Dept: CARDIOLOGY | Facility: CLINIC | Age: 55
End: 2020-03-11

## 2020-03-11 NOTE — TELEPHONE ENCOUNTER
Patient contacted and was advised that his results were:    STRESS ECHO NORMAL   OK FOR THE SURGERY  Patient stated understanding with no questions or concerns.

## 2020-03-13 ENCOUNTER — HOSPITAL ENCOUNTER (OUTPATIENT)
Facility: HOSPITAL | Age: 55
Discharge: HOME | End: 2020-03-13
Attending: COLON & RECTAL SURGERY | Admitting: COLON & RECTAL SURGERY
Payer: COMMERCIAL

## 2020-03-13 ENCOUNTER — ANESTHESIA (OUTPATIENT)
Dept: ENDOSCOPY | Facility: HOSPITAL | Age: 55
End: 2020-03-13
Payer: COMMERCIAL

## 2020-03-13 ENCOUNTER — ANESTHESIA EVENT (OUTPATIENT)
Dept: ENDOSCOPY | Facility: HOSPITAL | Age: 55
End: 2020-03-13
Payer: COMMERCIAL

## 2020-03-13 VITALS
RESPIRATION RATE: 16 BRPM | DIASTOLIC BLOOD PRESSURE: 96 MMHG | SYSTOLIC BLOOD PRESSURE: 125 MMHG | BODY MASS INDEX: 27.42 KG/M2 | HEART RATE: 84 BPM | WEIGHT: 213.63 LBS | HEIGHT: 74 IN | TEMPERATURE: 98 F | OXYGEN SATURATION: 95 %

## 2020-03-13 DIAGNOSIS — Z12.11 SCREENING FOR COLON CANCER: ICD-10-CM

## 2020-03-13 PROCEDURE — 88305 TISSUE EXAM BY PATHOLOGIST: CPT | Mod: 59 | Performed by: PATHOLOGY

## 2020-03-13 PROCEDURE — 45380 COLONOSCOPY AND BIOPSY: CPT | Mod: 59,,, | Performed by: COLON & RECTAL SURGERY

## 2020-03-13 PROCEDURE — 27201012 HC FORCEPS, HOT/COLD, DISP: Performed by: COLON & RECTAL SURGERY

## 2020-03-13 PROCEDURE — 37000009 HC ANESTHESIA EA ADD 15 MINS: Performed by: COLON & RECTAL SURGERY

## 2020-03-13 PROCEDURE — 45385 COLONOSCOPY W/LESION REMOVAL: CPT | Mod: 33,,, | Performed by: COLON & RECTAL SURGERY

## 2020-03-13 PROCEDURE — 27201089 HC SNARE, DISP (ANY): Performed by: COLON & RECTAL SURGERY

## 2020-03-13 PROCEDURE — 45385 PR COLONOSCOPY,REMV LESN,SNARE: ICD-10-PCS | Mod: 33,,, | Performed by: COLON & RECTAL SURGERY

## 2020-03-13 PROCEDURE — 37000008 HC ANESTHESIA 1ST 15 MINUTES: Performed by: COLON & RECTAL SURGERY

## 2020-03-13 PROCEDURE — 45380 COLONOSCOPY AND BIOPSY: CPT | Performed by: COLON & RECTAL SURGERY

## 2020-03-13 PROCEDURE — 88305 TISSUE EXAM BY PATHOLOGIST: CPT | Mod: 26,,, | Performed by: PATHOLOGY

## 2020-03-13 PROCEDURE — 45385 COLONOSCOPY W/LESION REMOVAL: CPT | Performed by: COLON & RECTAL SURGERY

## 2020-03-13 PROCEDURE — 63600175 PHARM REV CODE 636 W HCPCS: Performed by: NURSE ANESTHETIST, CERTIFIED REGISTERED

## 2020-03-13 PROCEDURE — 88305 TISSUE EXAM BY PATHOLOGIST: ICD-10-PCS | Mod: 26,,, | Performed by: PATHOLOGY

## 2020-03-13 PROCEDURE — 45380 PR COLONOSCOPY,BIOPSY: ICD-10-PCS | Mod: 59,,, | Performed by: COLON & RECTAL SURGERY

## 2020-03-13 RX ORDER — SODIUM CHLORIDE, SODIUM LACTATE, POTASSIUM CHLORIDE, CALCIUM CHLORIDE 600; 310; 30; 20 MG/100ML; MG/100ML; MG/100ML; MG/100ML
INJECTION, SOLUTION INTRAVENOUS CONTINUOUS
Status: DISCONTINUED | OUTPATIENT
Start: 2020-03-13 | End: 2020-03-13 | Stop reason: HOSPADM

## 2020-03-13 RX ORDER — SODIUM CHLORIDE, SODIUM LACTATE, POTASSIUM CHLORIDE, CALCIUM CHLORIDE 600; 310; 30; 20 MG/100ML; MG/100ML; MG/100ML; MG/100ML
INJECTION, SOLUTION INTRAVENOUS CONTINUOUS PRN
Status: DISCONTINUED | OUTPATIENT
Start: 2020-03-13 | End: 2020-03-13

## 2020-03-13 RX ORDER — PROPOFOL 10 MG/ML
VIAL (ML) INTRAVENOUS
Status: DISCONTINUED | OUTPATIENT
Start: 2020-03-13 | End: 2020-03-13

## 2020-03-13 RX ADMIN — PROPOFOL 20 MG: 10 INJECTION, EMULSION INTRAVENOUS at 07:03

## 2020-03-13 RX ADMIN — PROPOFOL 40 MG: 10 INJECTION, EMULSION INTRAVENOUS at 07:03

## 2020-03-13 RX ADMIN — PROPOFOL 60 MG: 10 INJECTION, EMULSION INTRAVENOUS at 07:03

## 2020-03-13 RX ADMIN — SODIUM CHLORIDE, SODIUM LACTATE, POTASSIUM CHLORIDE, AND CALCIUM CHLORIDE: 600; 310; 30; 20 INJECTION, SOLUTION INTRAVENOUS at 07:03

## 2020-03-13 NOTE — [employer] PATIENT INSTRUCTIONS
Discharge Summary/Instructions after an Endoscopic Procedure  Patient Name: Dimas Darden  Patient MRN: [id number]  Patient YOB: 1965 Friday, March 13, 2020 Mat Guerrero MD  RESTRICTIONS:  During your procedure today, you received medications for sedation.  These   medications may affect your judgment, balance and coordination.  Therefore,   for 24 hours, you have the following restrictions:   - DO NOT drive a car, operate machinery, make legal/financial decisions,   sign important papers or drink alcohol.    ACTIVITY:  Today: no heavy lifting, straining or running due to procedural   sedation/anesthesia.  The following day: return to full activity including work.  DIET:  Eat and drink normally unless instructed otherwise.     TREATMENT FOR COMMON SIDE EFFECTS:  - Mild abdominal pain, nausea, belching, bloating or excessive gas:  rest,   eat lightly and use a heating pad.  - Sore Throat: treat with throat lozenges and/or gargle with warm salt   water.  - Because air was used during the procedure, expelling large amounts of air   from your rectum or belching is normal.  - If a bowel prep was taken, you may not have a bowel movement for 1-3 days.    This is normal.  SYMPTOMS TO WATCH FOR AND REPORT TO YOUR PHYSICIAN:  1. Abdominal pain or bloating, other than gas cramps.  2. Chest pain.  3. Back pain.  4. Signs of infection such as: chills or fever occurring within 24 hours   after the procedure.  5. Rectal bleeding, which would show as bright red, maroon, or black stools.   (A tablespoon of blood from the rectum is not serious, especially if   hemorrhoids are present.)  6. Vomiting.  7. Weakness or dizziness.  GO DIRECTLY TO THE NEAREST EMERGENCY ROOM IF YOU HAVE ANY OF THE FOLLOWING:      Difficulty breathing              Chills and/or fever over 101 F   Persistent vomiting and/or vomiting blood   Severe abdominal pain   Severe chest pain   Black, tarry stools   Bleeding- more than one  tablespoon   Any other symptom or condition that you feel may need urgent attention  Your doctor recommends these additional instructions:  If any biopsies were taken, your doctors clinic will contact you in 1 to 2   weeks with any results.  - Discharge patient to home.   - High fiber diet.   - Continue present medications.   - Await pathology results.   - Repeat colonoscopy in 5 years for surveillance.   - Return to primary care physician PRN.  For questions, problems or results please call your physician Mat Guerrero MD at Work:  [phone]  If you have any questions about the above instructions, call the GI   department at [phone] or call the endoscopy unit at [phone]   from 7am until 3 pm.  [facility] [place], EMERGENCY ROOM PHONE NUMBER:   [phone]  IF A COMPLICATION OR EMERGENCY SITUATION ARISES AND YOU ARE UNABLE TO REACH   YOUR PHYSICIAN - GO DIRECTLY TO THE EMERGENCY ROOM.  I have read or have had read to me these discharge instructions for my   procedure and have received a written copy.  I understand these   instructions and will follow-up with my physician if I have any questions.     __________________________________       _____________________________________  Nurse Signature                                          Patient/Designated   Responsible Party Signature  MD Mat Ricks MD  3/13/2020 8:03:03 AM  This report has been verified and signed electronically.  [employer]

## 2020-03-13 NOTE — TRANSFER OF CARE
"Anesthesia Transfer of Care Note    Patient: Dimas Darden    Procedure(s) Performed: Procedure(s) (LRB):  COLONOSCOPY (N/A)    Patient location: GI    Anesthesia Type: MAC    Transport from OR: Transported from OR on room air with adequate spontaneous ventilation    Post pain: adequate analgesia    Post assessment: no apparent anesthetic complications    Post vital signs: stable    Level of consciousness: awake, alert and oriented    Nausea/Vomiting: no nausea/vomiting    Complications: none    Transfer of care protocol was followed      Last vitals:   Visit Vitals  BP 91/73   Pulse (!) 111   Temp 36.6 °C (97.9 °F)   Resp 20   Ht 6' 2" (1.88 m)   Wt 96.9 kg (213 lb 10 oz)   SpO2 96%   BMI 27.43 kg/m²     "

## 2020-03-13 NOTE — ANESTHESIA POSTPROCEDURE EVALUATION
Anesthesia Post Evaluation    Patient: Dimas Darden    Procedure(s) Performed: Procedure(s) (LRB):  COLONOSCOPY (N/A)    Final Anesthesia Type: general    Patient location during evaluation: PACU  Patient participation: Yes- Able to Participate  Level of consciousness: awake and alert  Post-procedure vital signs: reviewed and stable  Pain management: adequate  Airway patency: patent  GONZALO mitigation strategies: Extubation while patient is awake  PONV status at discharge: No PONV  Anesthetic complications: no      Cardiovascular status: hemodynamically stable  Respiratory status: spontaneous ventilation  Hydration status: euvolemic  Follow-up not needed.          Vitals Value Taken Time   BP 91/73 3/13/2020  8:02 AM   Temp 36.6 °C (97.9 °F) 3/13/2020  8:02 AM   Pulse 111 3/13/2020  8:02 AM   Resp 20 3/13/2020  8:02 AM   SpO2 96 % 3/13/2020  8:02 AM         No case tracking events are documented in the log.      Pain/Sal Score: Sal Score: 7 (3/13/2020  8:02 AM)

## 2020-03-13 NOTE — H&P
[facility]  Colon and Rectal Surgery  History & Physical    Patient Name: Dimas Darden  MRN: [id number]  Admission Date: 3/13/2020  Attending Physician: Mat Guerrero MD  Primary Care Provider: Angela Payne MD    Patient information was obtained from patient and medical records.    Subjective:     Chief Complaint/Reason for Admission: Here for Colonoscopy    History of Present Illness:  Patient is a 54 y.o. male presents for colonoscopy. Never had colonoscopy. No hematochezia, melena or change in bowel habits. No personal or fam hx of CRC, polyps or IBD. Has had recent bout of diverticulitis that has completely resolved.     No current facility-administered medications on file prior to encounter.      Current Outpatient Medications on File Prior to Encounter   Medication Sig    ciprofloxacin HCl (CIPRO) 500 MG tablet Take 500 mg by mouth.    metroNIDAZOLE (FLAGYL) 500 MG tablet Take 1 tablet (500 mg total) by mouth 3 (three) times daily. Take initial dose@2pm, second dose@3pm and final dose@10pm day before surgery    polyethylene glycol (GLYCOLAX) 17 gram/dose powder Take 238 g by mouth once daily. Mix with 2L of gatorade, drink all mixed solution starting@12pm day before surgery, finish by 10pm    traMADol (ULTRAM) 50 mg tablet Take 1 tablet (50 mg total) by mouth every 4 (four) hours as needed.    neomycin (MYCIFRADIN) 500 mg Tab Take 2 tablets (1,000 mg total) by mouth 3 (three) times daily. Take 1st dose@2pm,take 2nd dose@3pm, take last dose@10pm day before surgery (Patient not taking: Reported on 3/9/2020)    triamcinolone acetonide 0.1% (KENALOG) 0.1 % cream Apply topically 2 (two) times daily. To affected areas for up to 2-4 weeks (Patient not taking: Reported on 3/9/2020)       Review of patient's allergies indicates:  No Known Allergies    Past Medical History:   Diagnosis Date    Disc     Bulging disc in back    Diverticulitis      Past Surgical History:   Procedure  Laterality Date    UNDESCENDED TESTICLE EXPLORATION Left     Age 15     Family History     None        Tobacco Use    Smoking status: Former Smoker     Packs/day: 1.00     Years: 25.00     Pack years: 25.00     Last attempt to quit: 4/10/2012     Years since quittin.9    Smokeless tobacco: Never Used   Substance and Sexual Activity    Alcohol use: Yes     Comment: socially    Drug use: No    Sexual activity: Not on file     Review of Systems   Constitutional: Negative for activity change, appetite change, chills, fatigue, fever and unexpected weight change.   HENT: Negative for congestion, ear pain, sore throat and trouble swallowing.    Eyes: Negative for pain, redness and itching.   Respiratory: Negative for cough, shortness of breath and wheezing.    Cardiovascular: Negative for chest pain, palpitations and leg swelling.   Gastrointestinal: Negative for abdominal distention, abdominal pain, anal bleeding, blood in stool, constipation, diarrhea, nausea, rectal pain and vomiting.   Endocrine: Negative for cold intolerance, heat intolerance and polyuria.   Genitourinary: Negative for dysuria, flank pain, frequency and hematuria.   Musculoskeletal: Negative for gait problem, joint swelling and neck pain.   Skin: Negative for color change, rash and wound.   Allergic/Immunologic: Negative for environmental allergies and immunocompromised state.   Neurological: Negative for dizziness, speech difficulty, weakness and numbness.   Psychiatric/Behavioral: Negative for agitation, confusion and hallucinations.     Objective:     Vital Signs (Most Recent):  Temp: 97.3 °F (36.3 °C) (20)  Pulse: (!) 115 (20)  Resp: 17 (20)  BP: (!) 139/96 (20)  SpO2: 95 % (20) Vital Signs (24h Range):  Temp:  [97.3 °F (36.3 °C)] 97.3 °F (36.3 °C)  Pulse:  [115] 115  Resp:  [17] 17  SpO2:  [95 %] 95 %  BP: (139)/(96) 139/96     Weight: 96.9 kg (213 lb 10 oz)  Body mass index is  27.43 kg/m².    Physical Exam   Constitutional: He is oriented to person, place, and time. He appears well-developed.   HENT:   Head: Normocephalic and atraumatic.   Eyes: Conjunctivae and EOM are normal.   Neck: Normal range of motion. No thyromegaly present.   Cardiovascular: Normal rate and regular rhythm.   Pulmonary/Chest: Effort normal. No respiratory distress.   Abdominal: Soft. He exhibits no distension and no mass. There is no tenderness.   Musculoskeletal: Normal range of motion. He exhibits no edema or tenderness.   Neurological: He is alert and oriented to person, place, and time.   Skin: Skin is warm and dry. Capillary refill takes less than 2 seconds. No rash noted.   Psychiatric: He has a normal mood and affect.         Assessment/Plan:     Patient is a 54 y.o. male who presents for colonoscopy     - Ok to proceed to endoscopy suite for colonoscopy  - Consent obtained. All risks, benefits and alternatives fully explained to patient, including but not limited to bleeding, infection, perforation, and missed polyps. All questions appropriately answered to patient's satisfaction. Consent signed and placed on chart.    There are no hospital problems to display for this patient.    VTE Risk Mitigation (From admission, onward)    None          Mat Guerrero MD  Colon and Rectal Surgery  [facility]

## 2020-03-13 NOTE — ANESTHESIA PREPROCEDURE EVALUATION
03/13/2020  Dimas Darden is a 54 y.o., male.    Anesthesia Evaluation    I have reviewed the Patient Summary Reports.    I have reviewed the Nursing Notes.   I have reviewed the Medications.     Review of Systems  Anesthesia Hx:  No problems with previous Anesthesia Denies Hx of Anesthetic complications  Denies Family Hx of Anesthesia complications.   Denies Personal Hx of Anesthesia complications.   Social:  Alcohol Use, Former Smoker    Hematology/Oncology:  Hematology Normal   Oncology Normal     EENT/Dental:EENT/Dental Normal   Cardiovascular:   ECG has been reviewed.    Pulmonary:  Pulmonary Normal    Renal/:  Renal/ Normal     Hepatic/GI:   Bowel Prep. Diverticulitis of large intestine with perforation without bleeding   Musculoskeletal:  Musculoskeletal Normal    Neurological:  Neurology Normal    Endocrine:  Endocrine Normal    Dermatological:  Skin Normal    Psych:  Psychiatric Normal           Physical Exam  General:  Obesity    Airway/Jaw/Neck:  Airway Findings: Mouth Opening: Normal Tongue: Normal  General Airway Assessment: Adult  Mallampati: II  TM Distance: Normal, at least 6 cm      Dental:  Denies loose teeth             Anesthesia Plan  Type of Anesthesia, risks & benefits discussed:  Anesthesia Type:  MAC  Patient's Preference:   Intra-op Monitoring Plan: standard ASA monitors  Intra-op Monitoring Plan Comments:   Post Op Pain Control Plan: multimodal analgesia  Post Op Pain Control Plan Comments:   Induction:   IV  Beta Blocker:  Patient is not currently on a Beta-Blocker (No further documentation required).       Informed Consent: Patient understands risks and agrees with Anesthesia plan.  Questions answered. Anesthesia consent signed with patient.  ASA Score: 2     Day of Surgery Review of History & Physical: I have interviewed and examined the patient. I have reviewed the  patient's H&P dated:    H&P update referred to the provider.         Ready For Surgery From Anesthesia Perspective.

## 2020-03-13 NOTE — BRIEF OP NOTE
[facility] -   Brief Operative Note     SUMMARY     Surgery Date: 3/13/2020     Surgeon(s) and Role:     * Mat Guerrero MD - Primary    Assisting Surgeon: None    Pre-op Diagnosis:  Diverticulitis of large intestine with perforation without bleeding [K57.20]  Screen for colon cancer [Z12.11]    Post-op Diagnosis:  Post-Op Diagnosis Codes:     * Diverticulitis of large intestine with perforation without bleeding [K57.20]     * Screen for colon cancer [Z12.11]    Procedure(s) (LRB):  COLONOSCOPY (N/A)    Anesthesia: Monitor Anesthesia Care    Description of the findings of the procedure: See Op Note    Findings/Key Components: See Op Note    Estimated Blood Loss: * No values recorded between 3/13/2020 12:00 AM and 3/13/2020  8:03 AM *         Specimens:   Specimen (12h ago, onward)    None          Discharge Note    SUMMARY     Admit Date: 3/13/2020    Discharge Date and Time: 3/13/2020 8:03 AM    Hospital Course Patient was seen in the preoperative area by both myself and anesthesia. All consents were verified and all questions appropriately answered. All risks, benefits and alternatives explained to patient. Patient proceeded to endoscopy suite for colonoscopy and was discharged home postoperative once cleared by anesthesia.    Final Diagnosis: Post-Op Diagnosis Codes:     * Diverticulitis of large intestine with perforation without bleeding [K57.20]     * Screen for colon cancer [Z12.11]    Disposition: Home or Self Care    Follow Up/Patient Instructions: See Provation report    Medications:  Reconciled Home Medications:      Medication List      CONTINUE taking these medications    buPROPion 100 MG tablet  Commonly known as:  WELLBUTRIN  Take 1 tablet (100 mg total) by mouth 2 (two) times daily.     ciprofloxacin HCl 500 MG tablet  Commonly known as:  CIPRO  Take 500 mg by mouth.     metroNIDAZOLE 500 MG tablet  Commonly known as:  FLAGYL  Take 1 tablet (500 mg total) by mouth 3 (three) times  daily. Take initial dose@2pm, second dose@3pm and final dose@10pm day before surgery     neomycin 500 mg Tab  Commonly known as:  MYCIFRADIN  Take 2 tablets (1,000 mg total) by mouth 3 (three) times daily. Take 1st dose@2pm,take 2nd dose@3pm, take last dose@10pm day before surgery     polyethylene glycol 17 gram/dose powder  Commonly known as:  GLYCOLAX  Take 238 g by mouth once daily. Mix with 2L of gatorade, drink all mixed solution starting@12pm day before surgery, finish by 10pm     traMADoL 50 mg tablet  Commonly known as:  ULTRAM  Take 1 tablet (50 mg total) by mouth every 4 (four) hours as needed.     triamcinolone acetonide 0.1% 0.1 % cream  Commonly known as:  KENALOG  Apply topically 2 (two) times daily. To affected areas for up to 2-4 weeks          Discharge Procedure Orders   Diet general     Call MD for:  temperature >100.4     Call MD for:  persistent nausea and vomiting     Call MD for:  severe uncontrolled pain     Call MD for:  difficulty breathing, headache or visual disturbances     Call MD for:  redness, tenderness, or signs of infection (pain, swelling, redness, odor or green/yellow discharge around incision site)     Call MD for:  hives     Call MD for:  persistent dizziness or light-headedness     Call MD for:  extreme fatigue     Activity as tolerated     Follow-up Information     Angela Payne MD.    Specialty:  Internal Medicine  Why:  As needed  Contact information:  [address] [place] [address] DR Jeff GAINES [id number] [phone]

## 2020-03-13 NOTE — DISCHARGE INSTRUCTIONS
Understanding Colon and Rectal Polyps    The colon (also called the large intestine) is a muscular tube that forms the last part of the digestive tract. It absorbs water and stores food waste. The colon is about 4 to 6 feet long. The rectum is the last 6 inches of the colon. The colon and rectum have a smooth lining composed of millions of cells. Changes in these cells can lead to growths in the colon that can become cancerous and should be removed. Multiple tests are available to screen for colon cancer, but the colonoscopy is the most recommended test. During colonoscopy, these polyps can be removed. How often you need this test depends on many things including your condition, your family history, symptoms, and what the findings were at the previous colonoscopy.   When the colon lining changes  Changes that happen in the cells that line the colon or rectum can lead to growths called polyps. Over a period of years, polyps can turn cancerous. Removing polyps early may prevent cancer from ever forming.  Polyps  Polyps are fleshy clumps of tissue that form on the lining of the colon or rectum. Small polyps are usually benign (not cancerous). However, over time, cells in a polyp can change and become cancerous. Certain types of polyps known as adenomatous polyps are premalignant. The risk for invasive cancer increases with the size of the polyp and certain cell and gene features. This means that they can become cancerous if they're not removed. Hyperplastic polyps are benign. They can grow quite large and not turn cancerous.   Cancer  Almost all colorectal cancers start when polyp cells begin growing abnormally. As a cancerous tumor grows, it may involve more and more of the colon or rectum. In time, cancer can also grow beyond the colon or rectum and spread to nearby organs or to glands called lymph nodes. The cells can also travel to other parts of the body. This is known as metastasis. The earlier a cancerous  tumor is removed, the better the chance of preventing its spread.    Date Last Reviewed: 8/1/2016  © 0913-4399 [employer]. [address] [place] [address], [place] [address]. All rights reserved. This information is not intended as a substitute for professional medical care. Always follow your healthcare professional's instructions.        Diverticulosis    Diverticulosis means that small pouches have formed in the wall of your large intestine (colon). Most often, this problem causes no symptoms and is common as people age. But the pouches in the colon are at risk of becoming infected. When this happens, the condition is called diverticulitis. Although most people with diverticulosis never develop diverticulitis, it is still not uncommon. Rectal bleeding can also occur and in less common situations, a type of colon inflammation called colitis.  While most people do not have symptoms, some people with diverticulosis may have:  · Abdominal cramps and pain  · Bloating  · Constipation  · Change in bowel habits  Causes  The exact cause of diverticulosis (and diverticulitis) has not been proved, but a few things are associated with the condition:  · Low-fiber diet  · Constipation  · Lack of exercise  Your healthcare provider will talk with you about how to manage your condition. Diet changes may be all that are needed to help control diverticulosis and prevent progression to diverticulitis. If you develop diverticulitis, you will likely need other treatments.  Home care  You may be told to take fiber supplements daily. Fiber adds bulk to the stool so that it passes through the colon more easily. Stool softeners may be recommended. You may also be given medications for pain relief. Be sure to take all medications as directed.  In the past, people were told to avoid corn, nuts, and seeds. This is no longer necessary.  Follow these guidelines when caring for yourself at home:  · Eat unprocessed foods that are high in  fiber. Whole grains, fruits, and vegetables are good choices.  · Drink 6 to 8 glasses of water every day unless your healthcare provider has you limit how much fluid you should have.  · Watch for changes in your bowel movements. Tell your provider if you notice any changes.  · Begin an exercise program. Ask your provider how to get started. Generally, walking is the best.  · Get plenty of rest and sleep.  Follow-up care  Follow up with your healthcare provider, or as advised. Regular visits may be needed to check on your health. Sometimes special procedures such as colonoscopy, are needed after an episode of diverticulitis or blooding. Be sure to keep all your appointments.  If a stool sample was taken, or cultures were done, you should be told if they are positive, or if your treatment needs to be changed. You can call as directed for the results.  If X-rays were done, a radiologist will look at them. You will be told if there is a change in your treatment.  If antibiotics were prescribed, be sure to finish them all.  When to seek medical advice  Call your healthcare provider right away if any of these occur:  · Fever of 100.4°F (38°C) or higher, or as directed by your healthcare provider  · Severe cramps in the lower left side of the abdomen or pain that is getting worse  · Tenderness in the lower left side of the abdomen or worsening pain throughout the abdomen  · Diarrhea or constipation that doesn't get better within 24 hours  · Nausea and vomiting  · Bleeding from the rectum  Call 911  Call emergency services if any of the following occur:  · Trouble breathing  · Confusion  · Very drowsy or trouble awakening  · Fainting or loss of consciousness  · Rapid heart rate  · Chest pain  Date Last Reviewed: 12/30/2015  © 1511-8558 [employer]. [address] [place] [address], [place] [address]. All rights reserved. This information is not intended as a substitute for professional medical care. Always follow your  healthcare professional's instructions.

## 2020-03-17 LAB
FINAL PATHOLOGIC DIAGNOSIS: NORMAL
GROSS: NORMAL

## 2020-03-18 DIAGNOSIS — M54.50 CHRONIC LOW BACK PAIN WITHOUT SCIATICA, UNSPECIFIED BACK PAIN LATERALITY: ICD-10-CM

## 2020-03-18 DIAGNOSIS — G89.29 CHRONIC LOW BACK PAIN WITHOUT SCIATICA, UNSPECIFIED BACK PAIN LATERALITY: ICD-10-CM

## 2020-03-18 RX ORDER — TRAMADOL HYDROCHLORIDE 50 MG/1
50 TABLET ORAL EVERY 4 HOURS PRN
Qty: 40 TABLET | Refills: 1 | Status: SHIPPED | OUTPATIENT
Start: 2020-03-18 | End: 2020-06-01 | Stop reason: SDUPTHER

## 2020-03-24 NOTE — PROGRESS NOTES
All lesions removed our recent colonoscopy were benign, but 1 was then adenoma which is pre cancerous lesion.  Nothing further needs to be done at this time.  Recommend keeping repeat surveillance colonoscopy in 5 years as previously discussed.

## 2020-04-07 ENCOUNTER — PATIENT MESSAGE (OUTPATIENT)
Dept: INTERNAL MEDICINE | Facility: CLINIC | Age: 55
End: 2020-04-07

## 2020-06-01 DIAGNOSIS — M54.50 CHRONIC LOW BACK PAIN WITHOUT SCIATICA, UNSPECIFIED BACK PAIN LATERALITY: ICD-10-CM

## 2020-06-01 DIAGNOSIS — G89.29 CHRONIC LOW BACK PAIN WITHOUT SCIATICA, UNSPECIFIED BACK PAIN LATERALITY: ICD-10-CM

## 2020-06-01 RX ORDER — TRAMADOL HYDROCHLORIDE 50 MG/1
50 TABLET ORAL EVERY 4 HOURS PRN
Qty: 40 TABLET | Refills: 1 | Status: SHIPPED | OUTPATIENT
Start: 2020-06-01 | End: 2020-06-02 | Stop reason: SDUPTHER

## 2020-06-02 RX ORDER — TRAMADOL HYDROCHLORIDE 50 MG/1
50 TABLET ORAL EVERY 4 HOURS PRN
Qty: 40 TABLET | Refills: 1 | Status: SHIPPED | OUTPATIENT
Start: 2020-06-02 | End: 2020-09-18

## 2020-06-02 NOTE — TELEPHONE ENCOUNTER
----- Message from Briana Grayson sent at 6/2/2020  8:43 AM CDT -----  Contact: [facility]/ [place]  [facility] is calling the staff regarding clarification on a New RX Ultram because its more than 7 days    [id number] - [place], [place] - [address] [facility]  [address] [facility] [address]  Phone: [phone] Fax: [phone] thanks

## 2020-06-02 NOTE — TELEPHONE ENCOUNTER
----- Message from Niharika Lewis sent at 6/2/2020  9:55 AM CDT -----  Contact: [facility] [phone]  .Type:  Pharmacy Calling to Clarify an RX    Name of Caller:Yandy  Pharmacy Name:[facility]  Prescription Name: Ultram  What do they need to clarify?:need to clarify dosage for medicaiton  Best Call Back Number:[phone]  Additional Information:needing to speak with nurse

## 2020-07-13 ENCOUNTER — OFFICE VISIT (OUTPATIENT)
Dept: INTERNAL MEDICINE | Facility: CLINIC | Age: 55
End: 2020-07-13
Payer: COMMERCIAL

## 2020-07-13 DIAGNOSIS — K57.92 DIVERTICULITIS: Primary | ICD-10-CM

## 2020-07-13 PROCEDURE — 99213 PR OFFICE/OUTPT VISIT, EST, LEVL III, 20-29 MIN: ICD-10-PCS | Mod: 95,,, | Performed by: INTERNAL MEDICINE

## 2020-07-13 PROCEDURE — 99213 OFFICE O/P EST LOW 20 MIN: CPT | Mod: 95,,, | Performed by: INTERNAL MEDICINE

## 2020-07-13 RX ORDER — CIPROFLOXACIN 500 MG/1
TABLET ORAL
Qty: 20 TABLET | Refills: 0 | Status: SHIPPED | OUTPATIENT
Start: 2020-07-13 | End: 2020-07-31

## 2020-07-13 NOTE — PROGRESS NOTES
Subjective:       Patient ID: Dimas Darden is a 54 y.o. male.    PCP: AMIRAH Payne    Chief Complaint: Abdominal Pain    The patient location is: [place]   The chief complaint leading to consultation is: abdominal pain/diverticulitis     Visit type: audiovisual    Face to Face time with patient: 6 minutes  6 minutes of total time spent on the encounter, which includes face to face time and non-face to face time preparing to see the patient (eg, review of tests), Obtaining and/or reviewing separately obtained history, Documenting clinical information in the electronic or other health record, Independently interpreting results (not separately reported) and communicating results to the patient/family/caregiver, or Care coordination (not separately reported).     Each patient to whom he or she provides medical services by telemedicine is:  (1) informed of the relationship between the physician and patient and the respective role of any other health care provider with respect to management of the patient; and (2) notified that he or she may decline to receive medical services by telemedicine and may withdraw from such care at any time.    Abdominal Pain  This is a recurrent problem. The current episode started yesterday. The onset quality is sudden. The problem occurs rarely. The most recent episode lasted 3 days. The pain is located in the LLQ. The pain is at a severity of 5/10. The pain is moderate. The quality of the pain is sharp. The abdominal pain does not radiate. Pertinent negatives include no anorexia, arthralgias, belching, constipation, diarrhea, dysuria, fever, flatus, frequency, headaches, hematochezia, hematuria, melena, myalgias, nausea, vomiting or weight loss. The pain is aggravated by movement. The pain is relieved by bowel movements. He has tried antibiotics for the symptoms. The treatment provided moderate relief. Prior diagnostic workup includes GI consult. There is no history of abdominal  surgery, colon cancer, Crohn's disease, gallstones, GERD, irritable bowel syndrome, pancreatitis, PUD or ulcerative colitis. Patient's medical history does not include kidney stones and UTI.     He has a history of recurrent diverticulitis and was scheduled to have surgery but it was cancelled due to the COVID 19 pandemic. His symptoms are similar to prior episodes of diverticulitis. He has been taking Cipro and Flagyl but he is out of Cipro.     Review of Systems   Constitutional: Negative for fever and weight loss.   Gastrointestinal: Positive for abdominal pain. Negative for anorexia, blood in stool, constipation, diarrhea, flatus, hematochezia, melena, nausea and vomiting.   Genitourinary: Negative for dysuria, frequency and hematuria.   Musculoskeletal: Negative for arthralgias and myalgias.   Neurological: Negative for headaches.         Objective:      Physical Exam  Constitutional:       General: He is not in acute distress.     Appearance: He is well-developed. He is not ill-appearing.   Pulmonary:      Effort: Pulmonary effort is normal. No respiratory distress.   Neurological:      Mental Status: He is alert and oriented to person, place, and time.   Psychiatric:         Behavior: Behavior normal.         Thought Content: Thought content normal.         Judgment: Judgment normal.         Assessment:       1. Diverticulitis        Plan:       Dimas was seen today for abdominal pain.    Diagnoses and all orders for this visit:    Diverticulitis  -     Cipro 500 mg BID   -     Continue Flagyl  -     Low residue diet    F/U if symptoms worsen or fail to resolve with treatment.

## 2020-07-31 ENCOUNTER — OFFICE VISIT (OUTPATIENT)
Dept: INTERNAL MEDICINE | Facility: CLINIC | Age: 55
End: 2020-07-31
Payer: COMMERCIAL

## 2020-07-31 DIAGNOSIS — K57.92 DIVERTICULITIS: Primary | ICD-10-CM

## 2020-07-31 PROBLEM — Z01.810 PREOP CARDIOVASCULAR EXAM: Status: RESOLVED | Noted: 2020-03-09 | Resolved: 2020-07-31

## 2020-07-31 PROBLEM — Z12.11 SCREENING FOR COLON CANCER: Status: RESOLVED | Noted: 2020-03-13 | Resolved: 2020-07-31

## 2020-07-31 PROCEDURE — 99214 PR OFFICE/OUTPT VISIT, EST, LEVL IV, 30-39 MIN: ICD-10-PCS | Mod: 95,,, | Performed by: INTERNAL MEDICINE

## 2020-07-31 PROCEDURE — 99214 OFFICE O/P EST MOD 30 MIN: CPT | Mod: 95,,, | Performed by: INTERNAL MEDICINE

## 2020-07-31 RX ORDER — METRONIDAZOLE 500 MG/1
500 TABLET ORAL EVERY 8 HOURS
Qty: 30 TABLET | Refills: 0 | Status: SHIPPED | OUTPATIENT
Start: 2020-07-31 | End: 2020-10-16 | Stop reason: SDUPTHER

## 2020-07-31 RX ORDER — CIPROFLOXACIN 500 MG/1
500 TABLET ORAL 2 TIMES DAILY
Qty: 20 TABLET | Refills: 0 | Status: SHIPPED | OUTPATIENT
Start: 2020-07-31 | End: 2020-10-16 | Stop reason: SDUPTHER

## 2020-07-31 NOTE — PROGRESS NOTES
Telemedicine Video Visit    The patient location is:  Patient Home   The chief complaint leading to consultation is: diverticulitis  Visit type: Virtual visit with synchronous audio and video  Total time spent with patient: 25 minutes  Each patient to whom he or she provides medical services by telemedicine is:  (1) informed of the relationship between the physician and patient and the respective role of any other health care provider with respect to management of the patient; and (2) notified that he or she may decline to receive medical services by telemedicine and may withdraw from such care at any time.     He wants to get meds refilled    He has a long history of diverticulosis/diverticulitis- was supposed to have surgery in march but cancelled.  Usually sx LLQ pain, and antibiotics help.  Currently very mild pain, no fever.  No diarrhea.  No constipation.  No vomiting.    Patient Active Problem List   Diagnosis    Diverticulitis of large intestine with perforation without bleeding    EKG abnormalities       Answers for HPI/ROS submitted by the patient on 7/31/2020   Abdominal pain  Chronicity: recurrent  Onset: in the past 7 days  Onset quality: gradual  Frequency: 2 to 4 times per day  Episode duration: 1 hours  Progression since onset: unchanged  Pain location: LLQ  Pain - numeric: 3/10  Pain quality: cramping  Radiates to: periumbilical region  anorexia: No  arthralgias: No  belching: Yes  constipation: No  diarrhea: No  dysuria: No  fever: No  flatus: No  frequency: No  headaches: No  hematochezia: No  hematuria: No  melena: No  myalgias: No  nausea: No  weight loss: No  vomiting: No  Aggravated by: eating  Relieved by: bowel movements  Diagnostic workup: lower endoscopy  Pain severity: mild  Treatments tried: antacids  Improvement on treatment: mild  abdominal surgery: No  colon cancer: No  Crohn's disease: No  gallstones: No  GERD: No  irritable bowel syndrome: No  kidney stones: No  pancreatitis:  No  PUD: No  ulcerative colitis: No  UTI: No    Physical Exam  Constitutional:       General: He is not in acute distress.     Appearance: Normal appearance.   HENT:      Head: Normocephalic and atraumatic.   Pulmonary:      Effort: Pulmonary effort is normal. No respiratory distress.   Neurological:      General: No focal deficit present.      Mental Status: He is alert and oriented to person, place, and time.      Cranial Nerves: No cranial nerve deficit.   Psychiatric:         Mood and Affect: Mood normal.         Behavior: Behavior normal.         Thought Content: Thought content normal.         Judgment: Judgment normal.         Dimas was seen today for diverticulitis.    Diagnoses and all orders for this visit:    Diverticulitis    Other orders  -     ciprofloxacin HCl (CIPRO) 500 MG tablet; Take 1 tablet (500 mg total) by mouth 2 (two) times daily.  -     metroNIDAZOLE (FLAGYL) 500 MG tablet; Take 1 tablet (500 mg total) by mouth every 8 (eight) hours. No alcohol with this    Alarm sx and red flags reviewed, he is having none.  If sx change or worsen to UC or ER- he understand and agrees  Traill diet  Avoid alcohol  Keep follow up with CRS and PCP    Patient counseled for over 50% of the 25 minute appt, all questions answered,  chart reviewed and care coordinated.

## 2020-08-26 ENCOUNTER — TELEPHONE (OUTPATIENT)
Dept: SURGERY | Facility: CLINIC | Age: 55
End: 2020-08-26

## 2020-08-26 NOTE — TELEPHONE ENCOUNTER
Attempted to reach pt to schedule an appt with Dr Guerrero to discuss Sx - There was no answer and voicemail did not  to leave a message - Dr Guerrero has been made aware.     ----- Message from Mat Guerrero MD sent at 8/26/2020  1:42 PM CDT -----  Ever get in touch with him and get him back in to see me?  ----- Message -----  From: Maryuri Nielsen LPN  Sent: 8/5/2020  11:00 AM CDT  To: Mat Guerrero MD    Had to leave him a voicemail message, will continue throughout the day to try and reach him.  ----- Message -----  From: Mat Guerrero MD  Sent: 8/5/2020   9:19 AM CDT  To: Maryuri Nielsen LPN    He requested refill of abx but we have not seen him since March. He needs to come back and be seen if having symptoms again and be seen even if not as he still needs surgery. Can add him to this Monday if needed. We need to knwo what his symptoms are

## 2020-09-18 DIAGNOSIS — M54.50 CHRONIC LOW BACK PAIN WITHOUT SCIATICA, UNSPECIFIED BACK PAIN LATERALITY: ICD-10-CM

## 2020-09-18 DIAGNOSIS — G89.29 CHRONIC LOW BACK PAIN WITHOUT SCIATICA, UNSPECIFIED BACK PAIN LATERALITY: ICD-10-CM

## 2020-09-18 RX ORDER — TRAMADOL HYDROCHLORIDE 50 MG/1
TABLET ORAL
Qty: 40 TABLET | Refills: 0 | Status: SHIPPED | OUTPATIENT
Start: 2020-09-18 | End: 2021-01-19

## 2020-09-22 ENCOUNTER — TELEPHONE (OUTPATIENT)
Dept: INTERNAL MEDICINE | Facility: CLINIC | Age: 55
End: 2020-09-22

## 2020-09-22 NOTE — TELEPHONE ENCOUNTER
----- Message from Niharika Lewis sent at 9/22/2020  9:47 AM CDT -----  .Type:  Pharmacy Calling to Clarify an RX    Name of Caller:Samantha  Pharmacy Name:..  [facility] - [facility] [id number] [place] [address]  [id number] [place] [address]  Phone: [phone] Fax: [phone]      Prescription Name:Tramadol  What do they need to clarify?:  Best Call Back Number:[phone]  Additional Information: needing to know if prescription is medically necessary

## 2020-10-10 ENCOUNTER — PATIENT MESSAGE (OUTPATIENT)
Dept: INTERNAL MEDICINE | Facility: CLINIC | Age: 55
End: 2020-10-10

## 2020-10-14 ENCOUNTER — OFFICE VISIT (OUTPATIENT)
Dept: GASTROENTEROLOGY | Facility: CLINIC | Age: 55
End: 2020-10-14
Payer: COMMERCIAL

## 2020-10-14 VITALS
BODY MASS INDEX: 30.56 KG/M2 | WEIGHT: 238.13 LBS | DIASTOLIC BLOOD PRESSURE: 80 MMHG | HEIGHT: 74 IN | HEART RATE: 76 BPM | SYSTOLIC BLOOD PRESSURE: 130 MMHG

## 2020-10-14 DIAGNOSIS — Z86.010 HX OF ADENOMATOUS COLONIC POLYPS: ICD-10-CM

## 2020-10-14 DIAGNOSIS — K21.9 GASTROESOPHAGEAL REFLUX DISEASE WITHOUT ESOPHAGITIS: ICD-10-CM

## 2020-10-14 DIAGNOSIS — Z98.890 HISTORY OF ESOPHAGOGASTRODUODENOSCOPY (EGD): ICD-10-CM

## 2020-10-14 DIAGNOSIS — K57.92 DIVERTICULITIS: ICD-10-CM

## 2020-10-14 DIAGNOSIS — R10.13 EPIGASTRIC PAIN: Primary | ICD-10-CM

## 2020-10-14 PROCEDURE — 99999 PR PBB SHADOW E&M-EST. PATIENT-LVL III: CPT | Mod: PBBFAC,,, | Performed by: INTERNAL MEDICINE

## 2020-10-14 PROCEDURE — 99204 OFFICE O/P NEW MOD 45 MIN: CPT | Mod: S$GLB,,, | Performed by: INTERNAL MEDICINE

## 2020-10-14 PROCEDURE — 3008F BODY MASS INDEX DOCD: CPT | Mod: CPTII,S$GLB,, | Performed by: INTERNAL MEDICINE

## 2020-10-14 PROCEDURE — 99999 PR PBB SHADOW E&M-EST. PATIENT-LVL III: ICD-10-PCS | Mod: PBBFAC,,, | Performed by: INTERNAL MEDICINE

## 2020-10-14 PROCEDURE — 3008F PR BODY MASS INDEX (BMI) DOCUMENTED: ICD-10-PCS | Mod: CPTII,S$GLB,, | Performed by: INTERNAL MEDICINE

## 2020-10-14 PROCEDURE — 99204 PR OFFICE/OUTPT VISIT, NEW, LEVL IV, 45-59 MIN: ICD-10-PCS | Mod: S$GLB,,, | Performed by: INTERNAL MEDICINE

## 2020-10-14 RX ORDER — PANTOPRAZOLE SODIUM 40 MG/1
40 TABLET, DELAYED RELEASE ORAL DAILY
Qty: 30 TABLET | Refills: 5 | Status: SHIPPED | OUTPATIENT
Start: 2020-10-14 | End: 2021-03-29 | Stop reason: SDUPTHER

## 2020-10-14 NOTE — PROGRESS NOTES
"Subjective:       Patient ID: Dimas Darden is a 54 y.o. male.    Chief Complaint: Abdominal Pain    Patient with complaint of low epigastric discomfort and an "acid taste" which is worse when supine and increased belching. This has been going on for a couple of months. He has a history of Diverticulitis that has been recurring ~every 6 months for the past 5-6 years. He was seen by Dr Guerrero in March for colonoscopy to be followed by Surgery; however, he had to be cancelled because of Covid. The symptoms he is experiencing now occurred after the onset of his last bout of Diverticulitis which he treats with antibiotics and flushing his system at the 1st signs of symptom onset. This time it was followed by the epigastric or supra-umbilical pain.    There has been no nausea or vomiting, There has been no BRBPR or melena. There has been a 10-15 pound weight gain over the past 3-4 months. He has had previous rare instances of reflux when he would eat too late previously but now occurring daily. He takes Rolaids and peptobismol, Pepcid and [employer] brand acid suppressing agents. There has been no early satiety. There is no aversion to the sight or smell of food. There has been no use of NSAIDs.     He has never has UGI endoscopy before. Colon polyps were found om March Colonoscopy.     Review of Systems   Constitutional: Negative.  Negative for activity change, appetite change, chills, diaphoresis, fatigue, fever and unexpected weight change.   HENT: Negative for congestion, ear discharge, facial swelling, hearing loss, nosebleeds, postnasal drip, sinus pressure, sneezing, tinnitus, trouble swallowing and voice change.    Eyes: Negative for photophobia, redness and visual disturbance.   Respiratory: Negative for cough, chest tightness, shortness of breath and wheezing.    Cardiovascular: Negative for chest pain and palpitations.   Gastrointestinal: Positive for abdominal pain. Negative for abdominal distention, blood " in stool, constipation, diarrhea, nausea, rectal pain and vomiting.   Genitourinary: Negative for difficulty urinating, discharge, dysuria, flank pain, frequency, hematuria, scrotal swelling, testicular pain and urgency.   Musculoskeletal: Negative for arthralgias, back pain, gait problem, joint swelling, myalgias and neck stiffness.   Skin: Negative for color change, pallor, rash and wound.   Neurological: Negative for dizziness, tremors, seizures, syncope, facial asymmetry, speech difficulty, weakness, light-headedness, numbness and headaches.   Hematological: Negative for adenopathy. Does not bruise/bleed easily.   Psychiatric/Behavioral: Negative for agitation, confusion, hallucinations, sleep disturbance and suicidal ideas.       Objective:      Physical Exam  Vitals signs reviewed.   Constitutional:       General: He is not in acute distress.     Appearance: He is well-developed. He is not diaphoretic.   HENT:      Head: Normocephalic and atraumatic.      Nose: Nose normal.      Mouth/Throat:      Pharynx: No oropharyngeal exudate.   Eyes:      General: No scleral icterus.     Conjunctiva/sclera: Conjunctivae normal.      Pupils: Pupils are equal, round, and reactive to light.   Neck:      Musculoskeletal: Normal range of motion and neck supple.      Thyroid: No thyromegaly.   Cardiovascular:      Rate and Rhythm: Normal rate and regular rhythm.      Heart sounds: No murmur. No friction rub. No gallop.    Pulmonary:      Effort: Pulmonary effort is normal. No respiratory distress.      Breath sounds: Normal breath sounds. No wheezing or rales.   Abdominal:      General: Bowel sounds are normal. There is no distension.      Palpations: Abdomen is soft. There is no mass.      Tenderness: There is abdominal tenderness. There is no guarding or rebound.      Comments: Mild supraumbilical tenderness. Some in LL and RLQ.   Musculoskeletal:         General: No tenderness.   Lymphadenopathy:      Cervical: No cervical  adenopathy.   Skin:     General: Skin is warm.      Findings: No rash.   Neurological:      Mental Status: He is alert and oriented to person, place, and time.      Motor: No abnormal muscle tone.      Coordination: Coordination normal.   Psychiatric:         Behavior: Behavior normal.         Thought Content: Thought content normal.         Judgment: Judgment normal.         Assessment:   Epigastric pain  -     Case request GI: EGD (ESOPHAGOGASTRODUODENOSCOPY)    Gastroesophageal reflux disease without esophagitis  -     Case request GI: EGD (ESOPHAGOGASTRODUODENOSCOPY)  -     pantoprazole (PROTONIX) 40 MG tablet; Take 1 tablet (40 mg total) by mouth once daily. (Patient not taking: Reported on 10/16/2020)  Dispense: 30 tablet; Refill: 5    Diverticulitis    Hx of adenomatous colonic polyps          Plan:

## 2020-10-16 ENCOUNTER — OFFICE VISIT (OUTPATIENT)
Dept: INTERNAL MEDICINE | Facility: CLINIC | Age: 55
End: 2020-10-16
Payer: COMMERCIAL

## 2020-10-16 VITALS
HEART RATE: 68 BPM | WEIGHT: 241.19 LBS | BODY MASS INDEX: 30.95 KG/M2 | SYSTOLIC BLOOD PRESSURE: 152 MMHG | HEIGHT: 74 IN | OXYGEN SATURATION: 98 % | TEMPERATURE: 98 F | DIASTOLIC BLOOD PRESSURE: 102 MMHG | RESPIRATION RATE: 18 BRPM

## 2020-10-16 DIAGNOSIS — R63.5 WEIGHT GAIN: ICD-10-CM

## 2020-10-16 DIAGNOSIS — M54.50 CHRONIC LOW BACK PAIN WITHOUT SCIATICA, UNSPECIFIED BACK PAIN LATERALITY: Primary | ICD-10-CM

## 2020-10-16 DIAGNOSIS — G89.29 CHRONIC LOW BACK PAIN WITHOUT SCIATICA, UNSPECIFIED BACK PAIN LATERALITY: Primary | ICD-10-CM

## 2020-10-16 DIAGNOSIS — K57.92 DIVERTICULITIS: ICD-10-CM

## 2020-10-16 PROCEDURE — 3008F BODY MASS INDEX DOCD: CPT | Mod: CPTII,S$GLB,, | Performed by: FAMILY MEDICINE

## 2020-10-16 PROCEDURE — 99213 OFFICE O/P EST LOW 20 MIN: CPT | Mod: S$GLB,,, | Performed by: FAMILY MEDICINE

## 2020-10-16 PROCEDURE — 99999 PR PBB SHADOW E&M-EST. PATIENT-LVL IV: CPT | Mod: PBBFAC,,, | Performed by: FAMILY MEDICINE

## 2020-10-16 PROCEDURE — 99213 PR OFFICE/OUTPT VISIT, EST, LEVL III, 20-29 MIN: ICD-10-PCS | Mod: S$GLB,,, | Performed by: FAMILY MEDICINE

## 2020-10-16 PROCEDURE — 3008F PR BODY MASS INDEX (BMI) DOCUMENTED: ICD-10-PCS | Mod: CPTII,S$GLB,, | Performed by: FAMILY MEDICINE

## 2020-10-16 PROCEDURE — 99999 PR PBB SHADOW E&M-EST. PATIENT-LVL IV: ICD-10-PCS | Mod: PBBFAC,,, | Performed by: FAMILY MEDICINE

## 2020-10-16 RX ORDER — METRONIDAZOLE 500 MG/1
500 TABLET ORAL EVERY 8 HOURS
Qty: 30 TABLET | Refills: 0 | Status: ON HOLD | OUTPATIENT
Start: 2020-10-16 | End: 2021-06-08

## 2020-10-16 RX ORDER — BENZPHETAMINE HYDROCHLORIDE 50 MG/1
25 TABLET ORAL 3 TIMES DAILY
Qty: 45 EACH | Refills: 0 | Status: SHIPPED | OUTPATIENT
Start: 2020-10-16 | End: 2021-05-08

## 2020-10-16 RX ORDER — CIPROFLOXACIN 500 MG/1
500 TABLET ORAL 2 TIMES DAILY
Qty: 20 TABLET | Refills: 0 | Status: SHIPPED | OUTPATIENT
Start: 2020-10-16 | End: 2022-06-06 | Stop reason: ALTCHOICE

## 2020-10-16 NOTE — PROGRESS NOTES
Subjective:       Patient ID: Dimas Darden is a 54 y.o. male.    Chief Complaint: Weight Loss    HPI Mr. Darden presents for back pain due to weight gain.     He has gained weight since working from home due to the COVID pandemic.   Would like to try a different diet pill.   He had lost weight with Adipex but he had side effects that he did not like with it.     Hx of recurrent diverticulitis   Request refill on antibiotics he has to use periodically  He has not had a flare for many months     Review of Systems   Constitutional: Negative for activity change and unexpected weight change.   HENT: Negative for hearing loss, rhinorrhea and trouble swallowing.    Eyes: Negative for discharge and visual disturbance.   Respiratory: Negative for chest tightness and wheezing.    Cardiovascular: Negative for chest pain and palpitations.   Gastrointestinal: Negative for blood in stool, constipation, diarrhea and vomiting.   Endocrine: Negative for polydipsia and polyuria.   Genitourinary: Negative for difficulty urinating, hematuria and urgency.   Musculoskeletal: Negative for arthralgias, joint swelling and neck pain.   Neurological: Negative for weakness and headaches.   Psychiatric/Behavioral: Negative for confusion and dysphoric mood.         Past Medical History:   Diagnosis Date    Disc     Bulging disc in back    Diverticulitis      Past Surgical History:   Procedure Laterality Date    COLONOSCOPY N/A 3/13/2020    Procedure: COLONOSCOPY;  Surgeon: Mat Guerrero MD;  Location: [facility];  Service: General;  Laterality: N/A;    UNDESCENDED TESTICLE EXPLORATION Left     Age 15     Family History   Problem Relation Age of Onset    Diverticulitis Mother      Social History     Socioeconomic History    Marital status:      Spouse name: Not on file    Number of children: Not on file    Years of education: Not on file    Highest education level: Not on file   Occupational History    Not on file    Social Needs    Financial resource strain: Somewhat hard    Food insecurity     Worry: Never true     Inability: Never true    Transportation needs     Medical: No     Non-medical: No   Tobacco Use    Smoking status: Former Smoker     Packs/day: 1.00     Years: 25.00     Pack years: 25.00     Quit date: 4/10/2012     Years since quittin.5    Smokeless tobacco: Never Used   Substance and Sexual Activity    Alcohol use: Yes     Frequency: 2-4 times a month     Drinks per session: 1 or 2     Binge frequency: Never     Comment: socially  No alcohol 72h prior to sx    Drug use: No    Sexual activity: Yes     Partners: Female   Lifestyle    Physical activity     Days per week: 3 days     Minutes per session: 30 min    Stress: Only a little   Relationships    Social connections     Talks on phone: More than three times a week     Gets together: Never     Attends [employer] service: Not on file     Active member of club or organization: No     Attends meetings of clubs or organizations: Never     Relationship status:    Other Topics Concern    Not on file   Social History Narrative    Not on file       Objective:        Physical Exam  Vitals signs reviewed.   Constitutional:       Appearance: Normal appearance.   HENT:      Head: Normocephalic and atraumatic.   Cardiovascular:      Rate and Rhythm: Normal rate and regular rhythm.   Pulmonary:      Effort: Pulmonary effort is normal.      Breath sounds: Normal breath sounds.   Neurological:      Mental Status: He is alert and oriented to person, place, and time.   Psychiatric:         Mood and Affect: Mood normal.         Behavior: Behavior normal.           Results for orders placed or performed during the hospital encounter of 20   Specimen to Pathology, Surgery Gastrointestinal tract   Result Value Ref Range    Final Pathologic Diagnosis       1.  Polyp, transverse colon (biopsy):  - Hyperplastic polyp, inflamed  - No dysplasia or  "malignancy  2.  Polyp, descending colon (biopsy):  - Tubular adenoma  - No high-grade dysplasia or malignancy  3.  Polyp, rectosigmoid colon (biopsy):  - Hyperplastic polyp  - No dysplasia or malignancy      Gross       1:  Surgery ID:  [id number];  Pathology ID:  [id number]  1.  Received in formalin labeled "transverse colon polyp" is a single pale  tan tissue fragment, 0.3 cm in greatest dimension.  The specimen is entirely  embedded in cassette 1424, 1A.  Grossed by: Baljit Iyer   2: Surgery ID:  [id number];  Pathology ID:  [id number]  2.  Received in formalin labeled "descending colon polyp" is a single pale  tan tissue fragment, 0.3 cm in greatest dimension.  The specimen is entirely  embedded in cassette 1424, 2A.  Grossed by: Baljit Iyer   3: Surgery ID:  [id number];  Pathology ID:  [id number]  3.  Received in formalin labeled "rectal sigmoid colon polyp" is a single  pale tan tissue fragment, 0.3 cm in greatest dimension.  The specimen is  entirely embedded in cassette 1424, 3A.  Grossed by: Baljit Iyer         Assessment/Plan:     Chronic low back pain without sciatica, unspecified back pain laterality    Diverticulitis  -     metroNIDAZOLE (FLAGYL) 500 MG tablet; Take 1 tablet (500 mg total) by mouth every 8 (eight) hours. No alcohol with this  Dispense: 30 tablet; Refill: 0  -     ciprofloxacin HCl (CIPRO) 500 MG tablet; Take 1 tablet (500 mg total) by mouth 2 (two) times daily.  Dispense: 20 tablet; Refill: 0    Weight gain  -     benzphetamine 50 mg Tab; Take 25 mg by mouth 3 (three) times daily.  Dispense: 45 each; Refill: 0    BMI 30.0-30.9,adult  -     benzphetamine 50 mg Tab; Take 25 mg by mouth 3 (three) times daily.  Dispense: 45 each; Refill: 0      Discussed side effect profile   Discussed considering Contrave     Follow up 4-6 weeks for weight check if desire a refill    Angela Payne MD  ON   Family Medicine    "

## 2020-10-30 ENCOUNTER — TELEPHONE (OUTPATIENT)
Dept: GASTROENTEROLOGY | Facility: CLINIC | Age: 55
End: 2020-10-30

## 2020-10-30 ENCOUNTER — LAB VISIT (OUTPATIENT)
Dept: OTOLARYNGOLOGY | Facility: CLINIC | Age: 55
End: 2020-10-30
Payer: COMMERCIAL

## 2020-10-30 ENCOUNTER — CLINICAL SUPPORT (OUTPATIENT)
Dept: INTERNAL MEDICINE | Facility: CLINIC | Age: 55
End: 2020-10-30
Payer: COMMERCIAL

## 2020-10-30 VITALS — SYSTOLIC BLOOD PRESSURE: 146 MMHG | DIASTOLIC BLOOD PRESSURE: 102 MMHG

## 2020-10-30 DIAGNOSIS — Z98.890 HISTORY OF ESOPHAGOGASTRODUODENOSCOPY (EGD): ICD-10-CM

## 2020-10-30 LAB — SARS-COV-2 RNA RESP QL NAA+PROBE: NOT DETECTED

## 2020-10-30 PROCEDURE — 99999 PR PBB SHADOW E&M-EST. PATIENT-LVL I: ICD-10-PCS | Mod: PBBFAC,,,

## 2020-10-30 PROCEDURE — 99999 PR PBB SHADOW E&M-EST. PATIENT-LVL I: CPT | Mod: PBBFAC,,,

## 2020-10-30 PROCEDURE — U0003 INFECTIOUS AGENT DETECTION BY NUCLEIC ACID (DNA OR RNA); SEVERE ACUTE RESPIRATORY SYNDROME CORONAVIRUS 2 (SARS-COV-2) (CORONAVIRUS DISEASE [COVID-19]), AMPLIFIED PROBE TECHNIQUE, MAKING USE OF HIGH THROUGHPUT TECHNOLOGIES AS DESCRIBED BY CMS-2020-01-R: HCPCS

## 2020-10-30 RX ORDER — AMLODIPINE BESYLATE 5 MG/1
5 TABLET ORAL DAILY
Qty: 30 TABLET | Refills: 0 | Status: SHIPPED | OUTPATIENT
Start: 2020-10-30 | End: 2020-12-09 | Stop reason: SDUPTHER

## 2020-10-30 NOTE — Clinical Note
He is currently working on weight loss.   Let him know that with weight loss he may not need to continue the medication and schedule him to come back in 2-3 weeks

## 2020-10-30 NOTE — PROGRESS NOTES
Pt here for bp check   No acute distress noted   bp 146/102  MD notified and and she will start the patient on bp medication  Pt notified and appt made for 2 weeks to recheck bp   Pt verbalized understanding

## 2020-11-02 ENCOUNTER — TELEPHONE (OUTPATIENT)
Dept: ENDOSCOPY | Facility: HOSPITAL | Age: 55
End: 2020-11-02

## 2020-11-02 DIAGNOSIS — Z01.818 PRE-OPERATIVE CLEARANCE: ICD-10-CM

## 2020-11-02 DIAGNOSIS — R10.13 EPIGASTRIC PAIN: Primary | ICD-10-CM

## 2020-11-06 ENCOUNTER — DOCUMENTATION ONLY (OUTPATIENT)
Dept: GASTROENTEROLOGY | Facility: CLINIC | Age: 55
End: 2020-11-06

## 2020-11-12 ENCOUNTER — TELEPHONE (OUTPATIENT)
Dept: INTERNAL MEDICINE | Facility: CLINIC | Age: 55
End: 2020-11-12

## 2020-11-12 NOTE — TELEPHONE ENCOUNTER
----- Message from Angela Payne MD sent at 10/30/2020 10:09 AM CDT -----  He is currently working on weight loss. Let him know that with weight loss he may not need to continue the medication and schedule him to come back in 2-3 weeks

## 2020-12-09 RX ORDER — AMLODIPINE BESYLATE 5 MG/1
5 TABLET ORAL DAILY
Qty: 30 TABLET | Refills: 0 | Status: SHIPPED | OUTPATIENT
Start: 2020-12-09 | End: 2021-01-20 | Stop reason: SDUPTHER

## 2021-01-18 DIAGNOSIS — G89.29 CHRONIC LOW BACK PAIN WITHOUT SCIATICA, UNSPECIFIED BACK PAIN LATERALITY: ICD-10-CM

## 2021-01-18 DIAGNOSIS — M54.50 CHRONIC LOW BACK PAIN WITHOUT SCIATICA, UNSPECIFIED BACK PAIN LATERALITY: ICD-10-CM

## 2021-01-19 RX ORDER — TRAMADOL HYDROCHLORIDE 50 MG/1
50 TABLET ORAL EVERY 4 HOURS PRN
Qty: 40 TABLET | Refills: 0 | Status: SHIPPED | OUTPATIENT
Start: 2021-01-19 | End: 2021-02-26

## 2021-01-20 ENCOUNTER — PATIENT MESSAGE (OUTPATIENT)
Dept: INTERNAL MEDICINE | Facility: CLINIC | Age: 56
End: 2021-01-20

## 2021-02-24 DIAGNOSIS — M54.50 CHRONIC LOW BACK PAIN WITHOUT SCIATICA, UNSPECIFIED BACK PAIN LATERALITY: ICD-10-CM

## 2021-02-24 DIAGNOSIS — G89.29 CHRONIC LOW BACK PAIN WITHOUT SCIATICA, UNSPECIFIED BACK PAIN LATERALITY: ICD-10-CM

## 2021-02-26 RX ORDER — TRAMADOL HYDROCHLORIDE 50 MG/1
TABLET ORAL
Qty: 40 TABLET | Refills: 0 | Status: SHIPPED | OUTPATIENT
Start: 2021-02-26 | End: 2021-04-05 | Stop reason: SDUPTHER

## 2021-03-29 ENCOUNTER — PATIENT MESSAGE (OUTPATIENT)
Dept: GASTROENTEROLOGY | Facility: CLINIC | Age: 56
End: 2021-03-29

## 2021-03-29 DIAGNOSIS — Z01.818 PRE-OP TESTING: ICD-10-CM

## 2021-03-29 DIAGNOSIS — K21.9 GASTROESOPHAGEAL REFLUX DISEASE WITHOUT ESOPHAGITIS: Primary | ICD-10-CM

## 2021-03-29 RX ORDER — PANTOPRAZOLE SODIUM 40 MG/1
40 TABLET, DELAYED RELEASE ORAL DAILY
Qty: 30 TABLET | Refills: 5 | Status: SHIPPED | OUTPATIENT
Start: 2021-03-29 | End: 2021-05-06 | Stop reason: SDUPTHER

## 2021-04-12 ENCOUNTER — LAB VISIT (OUTPATIENT)
Dept: OTOLARYNGOLOGY | Facility: CLINIC | Age: 56
End: 2021-04-12
Payer: COMMERCIAL

## 2021-04-12 DIAGNOSIS — Z01.818 PRE-OP TESTING: ICD-10-CM

## 2021-04-12 PROCEDURE — U0003 INFECTIOUS AGENT DETECTION BY NUCLEIC ACID (DNA OR RNA); SEVERE ACUTE RESPIRATORY SYNDROME CORONAVIRUS 2 (SARS-COV-2) (CORONAVIRUS DISEASE [COVID-19]), AMPLIFIED PROBE TECHNIQUE, MAKING USE OF HIGH THROUGHPUT TECHNOLOGIES AS DESCRIBED BY CMS-2020-01-R: HCPCS | Performed by: INTERNAL MEDICINE

## 2021-04-12 PROCEDURE — U0005 INFEC AGEN DETEC AMPLI PROBE: HCPCS | Performed by: INTERNAL MEDICINE

## 2021-04-13 LAB — SARS-COV-2 RNA RESP QL NAA+PROBE: NOT DETECTED

## 2021-04-15 ENCOUNTER — ANESTHESIA EVENT (OUTPATIENT)
Dept: ENDOSCOPY | Facility: HOSPITAL | Age: 56
End: 2021-04-15
Payer: COMMERCIAL

## 2021-04-15 ENCOUNTER — PATIENT MESSAGE (OUTPATIENT)
Dept: INTERNAL MEDICINE | Facility: CLINIC | Age: 56
End: 2021-04-15

## 2021-04-15 ENCOUNTER — HOSPITAL ENCOUNTER (OUTPATIENT)
Facility: HOSPITAL | Age: 56
Discharge: HOME | End: 2021-04-15
Attending: INTERNAL MEDICINE | Admitting: INTERNAL MEDICINE
Payer: COMMERCIAL

## 2021-04-15 ENCOUNTER — ANESTHESIA (OUTPATIENT)
Dept: ENDOSCOPY | Facility: HOSPITAL | Age: 56
End: 2021-04-15
Payer: COMMERCIAL

## 2021-04-15 DIAGNOSIS — K21.9 GASTROESOPHAGEAL REFLUX DISEASE, UNSPECIFIED WHETHER ESOPHAGITIS PRESENT: Primary | ICD-10-CM

## 2021-04-15 PROBLEM — R10.13 EPIGASTRIC PAIN: Status: ACTIVE | Noted: 2021-04-15

## 2021-04-15 PROCEDURE — 43239 PR EGD, FLEX, W/BIOPSY, SGL/MULTI: ICD-10-PCS | Mod: ,,, | Performed by: INTERNAL MEDICINE

## 2021-04-15 PROCEDURE — 63600175 PHARM REV CODE 636 W HCPCS: Performed by: STUDENT IN AN ORGANIZED HEALTH CARE EDUCATION/TRAINING PROGRAM

## 2021-04-15 PROCEDURE — 27201012 HC FORCEPS, HOT/COLD, DISP: Performed by: INTERNAL MEDICINE

## 2021-04-15 PROCEDURE — 88305 TISSUE EXAM BY PATHOLOGIST: CPT | Mod: 26,,, | Performed by: PATHOLOGY

## 2021-04-15 PROCEDURE — 37000008 HC ANESTHESIA 1ST 15 MINUTES: Performed by: INTERNAL MEDICINE

## 2021-04-15 PROCEDURE — 88305 TISSUE EXAM BY PATHOLOGIST: CPT | Mod: 59 | Performed by: PATHOLOGY

## 2021-04-15 PROCEDURE — 37000009 HC ANESTHESIA EA ADD 15 MINS: Performed by: INTERNAL MEDICINE

## 2021-04-15 PROCEDURE — 88305 TISSUE EXAM BY PATHOLOGIST: ICD-10-PCS | Mod: 26,,, | Performed by: PATHOLOGY

## 2021-04-15 PROCEDURE — 43239 EGD BIOPSY SINGLE/MULTIPLE: CPT | Performed by: INTERNAL MEDICINE

## 2021-04-15 PROCEDURE — 25000003 PHARM REV CODE 250: Performed by: STUDENT IN AN ORGANIZED HEALTH CARE EDUCATION/TRAINING PROGRAM

## 2021-04-15 PROCEDURE — 43239 EGD BIOPSY SINGLE/MULTIPLE: CPT | Mod: ,,, | Performed by: INTERNAL MEDICINE

## 2021-04-15 RX ORDER — SODIUM CHLORIDE, SODIUM LACTATE, POTASSIUM CHLORIDE, CALCIUM CHLORIDE 600; 310; 30; 20 MG/100ML; MG/100ML; MG/100ML; MG/100ML
INJECTION, SOLUTION INTRAVENOUS CONTINUOUS PRN
Status: DISCONTINUED | OUTPATIENT
Start: 2021-04-15 | End: 2021-04-15

## 2021-04-15 RX ORDER — LIDOCAINE HYDROCHLORIDE 10 MG/ML
INJECTION, SOLUTION EPIDURAL; INFILTRATION; INTRACAUDAL; PERINEURAL
Status: DISCONTINUED | OUTPATIENT
Start: 2021-04-15 | End: 2021-04-15

## 2021-04-15 RX ORDER — PROPOFOL 10 MG/ML
VIAL (ML) INTRAVENOUS
Status: DISCONTINUED | OUTPATIENT
Start: 2021-04-15 | End: 2021-04-15

## 2021-04-15 RX ADMIN — PROPOFOL 150 MG: 10 INJECTION, EMULSION INTRAVENOUS at 02:04

## 2021-04-15 RX ADMIN — LIDOCAINE HYDROCHLORIDE 50 MG: 10 INJECTION, SOLUTION EPIDURAL; INFILTRATION; INTRACAUDAL; PERINEURAL at 02:04

## 2021-04-15 RX ADMIN — PROPOFOL 50 MG: 10 INJECTION, EMULSION INTRAVENOUS at 02:04

## 2021-04-15 RX ADMIN — PROPOFOL 20 MG: 10 INJECTION, EMULSION INTRAVENOUS at 02:04

## 2021-04-15 RX ADMIN — SODIUM CHLORIDE, SODIUM LACTATE, POTASSIUM CHLORIDE, AND CALCIUM CHLORIDE: 600; 310; 30; 20 INJECTION, SOLUTION INTRAVENOUS at 02:04

## 2021-04-16 VITALS
HEIGHT: 74 IN | BODY MASS INDEX: 30.8 KG/M2 | SYSTOLIC BLOOD PRESSURE: 155 MMHG | DIASTOLIC BLOOD PRESSURE: 104 MMHG | OXYGEN SATURATION: 95 % | WEIGHT: 240 LBS | TEMPERATURE: 98 F | RESPIRATION RATE: 14 BRPM | HEART RATE: 69 BPM

## 2021-04-19 ENCOUNTER — PATIENT MESSAGE (OUTPATIENT)
Dept: INTERNAL MEDICINE | Facility: CLINIC | Age: 56
End: 2021-04-19

## 2021-04-19 RX ORDER — AMLODIPINE BESYLATE 5 MG/1
5 TABLET ORAL DAILY
Qty: 30 TABLET | Refills: 0 | Status: SHIPPED | OUTPATIENT
Start: 2021-04-19 | End: 2021-05-06 | Stop reason: SDUPTHER

## 2021-04-19 RX ORDER — AMLODIPINE BESYLATE 5 MG/1
TABLET ORAL
Qty: 30 TABLET | Refills: 0 | OUTPATIENT
Start: 2021-04-19

## 2021-04-19 RX ORDER — AMLODIPINE BESYLATE 5 MG/1
5 TABLET ORAL DAILY
Qty: 30 TABLET | Refills: 0 | OUTPATIENT
Start: 2021-04-19 | End: 2022-04-19

## 2021-04-21 ENCOUNTER — PATIENT MESSAGE (OUTPATIENT)
Dept: SURGERY | Facility: CLINIC | Age: 56
End: 2021-04-21

## 2021-04-21 LAB
FINAL PATHOLOGIC DIAGNOSIS: NORMAL
GROSS: NORMAL
Lab: NORMAL

## 2021-05-06 ENCOUNTER — LAB VISIT (OUTPATIENT)
Dept: LAB | Facility: HOSPITAL | Age: 56
End: 2021-05-06
Attending: FAMILY MEDICINE
Payer: COMMERCIAL

## 2021-05-06 ENCOUNTER — OFFICE VISIT (OUTPATIENT)
Dept: INTERNAL MEDICINE | Facility: CLINIC | Age: 56
End: 2021-05-06
Payer: COMMERCIAL

## 2021-05-06 VITALS
SYSTOLIC BLOOD PRESSURE: 136 MMHG | TEMPERATURE: 99 F | WEIGHT: 245.81 LBS | OXYGEN SATURATION: 97 % | RESPIRATION RATE: 18 BRPM | HEART RATE: 76 BPM | BODY MASS INDEX: 31.55 KG/M2 | DIASTOLIC BLOOD PRESSURE: 80 MMHG | HEIGHT: 74 IN

## 2021-05-06 DIAGNOSIS — K21.9 GASTROESOPHAGEAL REFLUX DISEASE WITHOUT ESOPHAGITIS: ICD-10-CM

## 2021-05-06 DIAGNOSIS — Z00.00 ROUTINE PHYSICAL EXAMINATION: Primary | ICD-10-CM

## 2021-05-06 DIAGNOSIS — R53.83 LOW ENERGY: ICD-10-CM

## 2021-05-06 DIAGNOSIS — G89.29 CHRONIC LOW BACK PAIN WITHOUT SCIATICA, UNSPECIFIED BACK PAIN LATERALITY: ICD-10-CM

## 2021-05-06 DIAGNOSIS — I10 ESSENTIAL HYPERTENSION: ICD-10-CM

## 2021-05-06 DIAGNOSIS — M54.50 CHRONIC LOW BACK PAIN WITHOUT SCIATICA, UNSPECIFIED BACK PAIN LATERALITY: ICD-10-CM

## 2021-05-06 DIAGNOSIS — Z00.00 ROUTINE PHYSICAL EXAMINATION: ICD-10-CM

## 2021-05-06 LAB
ALBUMIN SERPL BCP-MCNC: 4.1 G/DL (ref 3.5–5.2)
ALP SERPL-CCNC: 55 U/L (ref 55–135)
ALT SERPL W/O P-5'-P-CCNC: 48 U/L (ref 10–44)
ANION GAP SERPL CALC-SCNC: 9 MMOL/L (ref 8–16)
AST SERPL-CCNC: 27 U/L (ref 10–40)
BASOPHILS # BLD AUTO: 0.04 K/UL (ref 0–0.2)
BASOPHILS NFR BLD: 0.5 % (ref 0–1.9)
BILIRUB SERPL-MCNC: 0.5 MG/DL (ref 0.1–1)
BUN SERPL-MCNC: 14 MG/DL (ref 6–20)
CALCIUM SERPL-MCNC: 10 MG/DL (ref 8.7–10.5)
CHLORIDE SERPL-SCNC: 103 MMOL/L (ref 95–110)
CHOLEST SERPL-MCNC: 246 MG/DL (ref 120–199)
CHOLEST/HDLC SERPL: 4.6 {RATIO} (ref 2–5)
CO2 SERPL-SCNC: 29 MMOL/L (ref 23–29)
CREAT SERPL-MCNC: 1 MG/DL (ref 0.5–1.4)
DIFFERENTIAL METHOD: NORMAL
EOSINOPHIL # BLD AUTO: 0.2 K/UL (ref 0–0.5)
EOSINOPHIL NFR BLD: 3.1 % (ref 0–8)
ERYTHROCYTE [DISTWIDTH] IN BLOOD BY AUTOMATED COUNT: 12.3 % (ref 11.5–14.5)
EST. GFR  (AFRICAN AMERICAN): >60 ML/MIN/1.73 M^2
EST. GFR  (NON AFRICAN AMERICAN): >60 ML/MIN/1.73 M^2
GLUCOSE SERPL-MCNC: 91 MG/DL (ref 70–110)
HCT VFR BLD AUTO: 48 % (ref 40–54)
HDLC SERPL-MCNC: 54 MG/DL (ref 40–75)
HDLC SERPL: 22 % (ref 20–50)
HGB BLD-MCNC: 15.8 G/DL (ref 14–18)
IMM GRANULOCYTES # BLD AUTO: 0.04 K/UL (ref 0–0.04)
IMM GRANULOCYTES NFR BLD AUTO: 0.5 % (ref 0–0.5)
LDLC SERPL CALC-MCNC: 156.8 MG/DL (ref 63–159)
LYMPHOCYTES # BLD AUTO: 2 K/UL (ref 1–4.8)
LYMPHOCYTES NFR BLD: 25.5 % (ref 18–48)
MCH RBC QN AUTO: 29.6 PG (ref 27–31)
MCHC RBC AUTO-ENTMCNC: 32.9 G/DL (ref 32–36)
MCV RBC AUTO: 90 FL (ref 82–98)
MONOCYTES # BLD AUTO: 0.7 K/UL (ref 0.3–1)
MONOCYTES NFR BLD: 9.1 % (ref 4–15)
NEUTROPHILS # BLD AUTO: 4.7 K/UL (ref 1.8–7.7)
NEUTROPHILS NFR BLD: 61.3 % (ref 38–73)
NONHDLC SERPL-MCNC: 192 MG/DL
NRBC BLD-RTO: 0 /100 WBC
PLATELET # BLD AUTO: 254 K/UL (ref 150–450)
PMV BLD AUTO: 10.9 FL (ref 9.2–12.9)
POTASSIUM SERPL-SCNC: 4.5 MMOL/L (ref 3.5–5.1)
PROT SERPL-MCNC: 7.4 G/DL (ref 6–8.4)
RBC # BLD AUTO: 5.34 M/UL (ref 4.6–6.2)
SODIUM SERPL-SCNC: 141 MMOL/L (ref 136–145)
TRIGL SERPL-MCNC: 176 MG/DL (ref 30–150)
WBC # BLD AUTO: 7.72 K/UL (ref 3.9–12.7)

## 2021-05-06 PROCEDURE — 99396 PR PREVENTIVE VISIT,EST,40-64: ICD-10-PCS | Mod: 25,S$GLB,, | Performed by: FAMILY MEDICINE

## 2021-05-06 PROCEDURE — 80053 COMPREHEN METABOLIC PANEL: CPT | Performed by: FAMILY MEDICINE

## 2021-05-06 PROCEDURE — 96372 PR INJECTION,THERAP/PROPH/DIAG2ST, IM OR SUBCUT: ICD-10-PCS | Mod: S$GLB,,, | Performed by: FAMILY MEDICINE

## 2021-05-06 PROCEDURE — 84403 ASSAY OF TOTAL TESTOSTERONE: CPT | Performed by: FAMILY MEDICINE

## 2021-05-06 PROCEDURE — 1126F PR PAIN SEVERITY QUANTIFIED, NO PAIN PRESENT: ICD-10-PCS | Mod: S$GLB,,, | Performed by: FAMILY MEDICINE

## 2021-05-06 PROCEDURE — 1126F AMNT PAIN NOTED NONE PRSNT: CPT | Mod: S$GLB,,, | Performed by: FAMILY MEDICINE

## 2021-05-06 PROCEDURE — 99396 PREV VISIT EST AGE 40-64: CPT | Mod: 25,S$GLB,, | Performed by: FAMILY MEDICINE

## 2021-05-06 PROCEDURE — 96372 THER/PROPH/DIAG INJ SC/IM: CPT | Mod: S$GLB,,, | Performed by: FAMILY MEDICINE

## 2021-05-06 PROCEDURE — 85025 COMPLETE CBC W/AUTO DIFF WBC: CPT | Performed by: FAMILY MEDICINE

## 2021-05-06 PROCEDURE — 3008F BODY MASS INDEX DOCD: CPT | Mod: CPTII,S$GLB,, | Performed by: FAMILY MEDICINE

## 2021-05-06 PROCEDURE — 99999 PR PBB SHADOW E&M-EST. PATIENT-LVL IV: CPT | Mod: PBBFAC,,, | Performed by: FAMILY MEDICINE

## 2021-05-06 PROCEDURE — 3008F PR BODY MASS INDEX (BMI) DOCUMENTED: ICD-10-PCS | Mod: CPTII,S$GLB,, | Performed by: FAMILY MEDICINE

## 2021-05-06 PROCEDURE — 99999 PR PBB SHADOW E&M-EST. PATIENT-LVL IV: ICD-10-PCS | Mod: PBBFAC,,, | Performed by: FAMILY MEDICINE

## 2021-05-06 PROCEDURE — 36415 COLL VENOUS BLD VENIPUNCTURE: CPT | Performed by: FAMILY MEDICINE

## 2021-05-06 PROCEDURE — 80061 LIPID PANEL: CPT | Performed by: FAMILY MEDICINE

## 2021-05-06 RX ORDER — TRAMADOL HYDROCHLORIDE 50 MG/1
50 TABLET ORAL EVERY 4 HOURS PRN
Qty: 40 TABLET | Refills: 0 | Status: SHIPPED | OUTPATIENT
Start: 2021-05-06 | End: 2021-06-15 | Stop reason: SDUPTHER

## 2021-05-06 RX ORDER — AMLODIPINE BESYLATE 5 MG/1
5 TABLET ORAL DAILY
Qty: 90 TABLET | Refills: 3 | Status: SHIPPED | OUTPATIENT
Start: 2021-05-06 | End: 2021-06-30 | Stop reason: SDUPTHER

## 2021-05-06 RX ORDER — METHOCARBAMOL 500 MG/1
500 TABLET, FILM COATED ORAL 4 TIMES DAILY
Qty: 40 TABLET | Refills: 0 | Status: SHIPPED | OUTPATIENT
Start: 2021-05-06 | End: 2021-07-01

## 2021-05-06 RX ORDER — PANTOPRAZOLE SODIUM 40 MG/1
40 TABLET, DELAYED RELEASE ORAL DAILY
Qty: 90 TABLET | Refills: 3 | Status: SHIPPED | OUTPATIENT
Start: 2021-05-06 | End: 2021-06-30 | Stop reason: SDUPTHER

## 2021-05-06 RX ORDER — CYANOCOBALAMIN 1000 UG/ML
1000 INJECTION, SOLUTION INTRAMUSCULAR; SUBCUTANEOUS ONCE
Status: COMPLETED | OUTPATIENT
Start: 2021-05-06 | End: 2021-05-06

## 2021-05-06 RX ADMIN — CYANOCOBALAMIN 1000 MCG: 1000 INJECTION, SOLUTION INTRAMUSCULAR; SUBCUTANEOUS at 09:05

## 2021-05-13 LAB
ALBUMIN SERPL-MCNC: 4.4 G/DL (ref 3.6–5.1)
SHBG SERPL-SCNC: 20 NMOL/L (ref 10–50)
TESTOST FREE SERPL-MCNC: 70.2 PG/ML (ref 46–224)
TESTOST SERPL-MCNC: 367 NG/DL (ref 250–1100)
TESTOSTERONE.FREE+WB SERPL-MCNC: 141.3 NG/DL (ref 110–575)

## 2021-05-24 ENCOUNTER — TELEPHONE (OUTPATIENT)
Dept: SURGERY | Facility: CLINIC | Age: 56
End: 2021-05-24

## 2021-05-24 ENCOUNTER — HOSPITAL ENCOUNTER (OUTPATIENT)
Dept: RADIOLOGY | Facility: HOSPITAL | Age: 56
Discharge: HOME | End: 2021-05-24
Attending: COLON & RECTAL SURGERY
Payer: COMMERCIAL

## 2021-05-24 ENCOUNTER — OFFICE VISIT (OUTPATIENT)
Dept: SURGERY | Facility: CLINIC | Age: 56
End: 2021-05-24
Payer: COMMERCIAL

## 2021-05-24 ENCOUNTER — HOSPITAL ENCOUNTER (OUTPATIENT)
Dept: CARDIOLOGY | Facility: HOSPITAL | Age: 56
Discharge: HOME | End: 2021-05-24
Attending: COLON & RECTAL SURGERY
Payer: COMMERCIAL

## 2021-05-24 VITALS
DIASTOLIC BLOOD PRESSURE: 99 MMHG | TEMPERATURE: 98 F | HEIGHT: 74 IN | WEIGHT: 245.13 LBS | SYSTOLIC BLOOD PRESSURE: 160 MMHG | HEART RATE: 84 BPM | BODY MASS INDEX: 31.46 KG/M2

## 2021-05-24 DIAGNOSIS — Z01.818 PRE-OP TESTING: ICD-10-CM

## 2021-05-24 DIAGNOSIS — K57.32 DIVERTICULITIS OF SIGMOID COLON: ICD-10-CM

## 2021-05-24 DIAGNOSIS — K57.20 DIVERTICULITIS OF LARGE INTESTINE WITH PERFORATION WITHOUT BLEEDING: ICD-10-CM

## 2021-05-24 DIAGNOSIS — Z01.818 PRE-OP TESTING: Primary | ICD-10-CM

## 2021-05-24 PROCEDURE — 71046 X-RAY EXAM CHEST 2 VIEWS: CPT | Mod: 26,,, | Performed by: RADIOLOGY

## 2021-05-24 PROCEDURE — 71046 X-RAY EXAM CHEST 2 VIEWS: CPT | Mod: TC

## 2021-05-24 PROCEDURE — 93010 ELECTROCARDIOGRAM REPORT: CPT | Mod: ,,, | Performed by: INTERNAL MEDICINE

## 2021-05-24 PROCEDURE — 99999 PR PBB SHADOW E&M-EST. PATIENT-LVL V: ICD-10-PCS | Mod: PBBFAC,,, | Performed by: COLON & RECTAL SURGERY

## 2021-05-24 PROCEDURE — 99999 PR PBB SHADOW E&M-EST. PATIENT-LVL V: CPT | Mod: PBBFAC,,, | Performed by: COLON & RECTAL SURGERY

## 2021-05-24 PROCEDURE — 1126F AMNT PAIN NOTED NONE PRSNT: CPT | Mod: S$GLB,,, | Performed by: COLON & RECTAL SURGERY

## 2021-05-24 PROCEDURE — 3008F PR BODY MASS INDEX (BMI) DOCUMENTED: ICD-10-PCS | Mod: CPTII,S$GLB,, | Performed by: COLON & RECTAL SURGERY

## 2021-05-24 PROCEDURE — 1126F PR PAIN SEVERITY QUANTIFIED, NO PAIN PRESENT: ICD-10-PCS | Mod: S$GLB,,, | Performed by: COLON & RECTAL SURGERY

## 2021-05-24 PROCEDURE — 99214 PR OFFICE/OUTPT VISIT, EST, LEVL IV, 30-39 MIN: ICD-10-PCS | Mod: S$GLB,,, | Performed by: COLON & RECTAL SURGERY

## 2021-05-24 PROCEDURE — 99214 OFFICE O/P EST MOD 30 MIN: CPT | Mod: S$GLB,,, | Performed by: COLON & RECTAL SURGERY

## 2021-05-24 PROCEDURE — 93010 EKG 12-LEAD: ICD-10-PCS | Mod: ,,, | Performed by: INTERNAL MEDICINE

## 2021-05-24 PROCEDURE — 93005 ELECTROCARDIOGRAM TRACING: CPT

## 2021-05-24 PROCEDURE — 71046 XR CHEST PA AND LATERAL: ICD-10-PCS | Mod: 26,,, | Performed by: RADIOLOGY

## 2021-05-24 PROCEDURE — 3008F BODY MASS INDEX DOCD: CPT | Mod: CPTII,S$GLB,, | Performed by: COLON & RECTAL SURGERY

## 2021-05-24 RX ORDER — HEPARIN SODIUM 5000 [USP'U]/ML
5000 INJECTION, SOLUTION INTRAVENOUS; SUBCUTANEOUS
Status: CANCELLED | OUTPATIENT
Start: 2021-05-24 | End: 2021-05-25

## 2021-05-24 RX ORDER — SODIUM CHLORIDE, SODIUM LACTATE, POTASSIUM CHLORIDE, CALCIUM CHLORIDE 600; 310; 30; 20 MG/100ML; MG/100ML; MG/100ML; MG/100ML
INJECTION, SOLUTION INTRAVENOUS CONTINUOUS
Status: CANCELLED | OUTPATIENT
Start: 2021-05-24

## 2021-05-24 RX ORDER — CELECOXIB 100 MG/1
400 CAPSULE ORAL
Status: CANCELLED | OUTPATIENT
Start: 2021-05-24 | End: 2021-05-24

## 2021-05-24 RX ORDER — GABAPENTIN 100 MG/1
800 CAPSULE ORAL
Status: CANCELLED | OUTPATIENT
Start: 2021-05-24 | End: 2021-05-24

## 2021-05-24 RX ORDER — ACETAMINOPHEN 325 MG/1
1000 TABLET ORAL ONCE
Status: CANCELLED | OUTPATIENT
Start: 2021-05-24 | End: 2021-05-24

## 2021-05-24 RX ORDER — METRONIDAZOLE 500 MG/100ML
500 INJECTION, SOLUTION INTRAVENOUS
Status: CANCELLED | OUTPATIENT
Start: 2021-05-24

## 2021-05-24 RX ORDER — ONDANSETRON 2 MG/ML
4 INJECTION INTRAMUSCULAR; INTRAVENOUS EVERY 6 HOURS PRN
Status: CANCELLED | OUTPATIENT
Start: 2021-05-24

## 2021-05-26 ENCOUNTER — PATIENT MESSAGE (OUTPATIENT)
Dept: SURGERY | Facility: HOSPITAL | Age: 56
End: 2021-05-26

## 2021-05-26 ENCOUNTER — TELEPHONE (OUTPATIENT)
Dept: SURGERY | Facility: CLINIC | Age: 56
End: 2021-05-26

## 2021-06-07 ENCOUNTER — ANESTHESIA EVENT (OUTPATIENT)
Dept: SURGERY | Facility: HOSPITAL | Age: 56
DRG: 331 | End: 2021-06-07
Payer: COMMERCIAL

## 2021-06-08 ENCOUNTER — HOSPITAL ENCOUNTER (INPATIENT)
Facility: HOSPITAL | Age: 56
LOS: 2 days | Discharge: HOME | DRG: 331 | End: 2021-06-10
Attending: COLON & RECTAL SURGERY | Admitting: COLON & RECTAL SURGERY
Payer: COMMERCIAL

## 2021-06-08 ENCOUNTER — ANESTHESIA (OUTPATIENT)
Dept: SURGERY | Facility: HOSPITAL | Age: 56
DRG: 331 | End: 2021-06-08
Payer: COMMERCIAL

## 2021-06-08 DIAGNOSIS — K57.32 DIVERTICULITIS OF SIGMOID COLON: ICD-10-CM

## 2021-06-08 DIAGNOSIS — K57.20 DIVERTICULITIS OF LARGE INTESTINE WITH PERFORATION WITHOUT BLEEDING: ICD-10-CM

## 2021-06-08 PROCEDURE — 44213 PR LAP, SURG MOBIL SPLENIC FL DUR PTL COLECTOMY: ICD-10-PCS | Mod: ,,, | Performed by: COLON & RECTAL SURGERY

## 2021-06-08 PROCEDURE — 36000711: Performed by: COLON & RECTAL SURGERY

## 2021-06-08 PROCEDURE — 25000003 PHARM REV CODE 250: Performed by: COLON & RECTAL SURGERY

## 2021-06-08 PROCEDURE — S0030 INJECTION, METRONIDAZOLE: HCPCS | Performed by: COLON & RECTAL SURGERY

## 2021-06-08 PROCEDURE — 36000710: Performed by: COLON & RECTAL SURGERY

## 2021-06-08 PROCEDURE — 63600175 PHARM REV CODE 636 W HCPCS: Performed by: COLON & RECTAL SURGERY

## 2021-06-08 PROCEDURE — 44207 L COLECTOMY/COLOPROCTOSTOMY: CPT | Mod: ,,, | Performed by: COLON & RECTAL SURGERY

## 2021-06-08 PROCEDURE — 27000221 HC OXYGEN, UP TO 24 HOURS

## 2021-06-08 PROCEDURE — 44207 PR LAP,SURG,COLECTOMY,W/ANAST: ICD-10-PCS | Mod: ,,, | Performed by: COLON & RECTAL SURGERY

## 2021-06-08 PROCEDURE — 88307 TISSUE EXAM BY PATHOLOGIST: CPT | Mod: 26,,, | Performed by: PATHOLOGY

## 2021-06-08 PROCEDURE — 27201423 OPTIME MED/SURG SUP & DEVICES STERILE SUPPLY: Performed by: COLON & RECTAL SURGERY

## 2021-06-08 PROCEDURE — 94761 N-INVAS EAR/PLS OXIMETRY MLT: CPT

## 2021-06-08 PROCEDURE — 37000009 HC ANESTHESIA EA ADD 15 MINS: Performed by: COLON & RECTAL SURGERY

## 2021-06-08 PROCEDURE — 63600175 PHARM REV CODE 636 W HCPCS: Performed by: STUDENT IN AN ORGANIZED HEALTH CARE EDUCATION/TRAINING PROGRAM

## 2021-06-08 PROCEDURE — 88305 TISSUE EXAM BY PATHOLOGIST: CPT | Performed by: PATHOLOGY

## 2021-06-08 PROCEDURE — 63600175 PHARM REV CODE 636 W HCPCS: Performed by: ANESTHESIOLOGY

## 2021-06-08 PROCEDURE — 94799 UNLISTED PULMONARY SVC/PX: CPT

## 2021-06-08 PROCEDURE — 88307 PR  SURG PATH,LEVEL V: ICD-10-PCS | Mod: 26,,, | Performed by: PATHOLOGY

## 2021-06-08 PROCEDURE — 11000001 HC ACUTE MED/SURG PRIVATE ROOM

## 2021-06-08 PROCEDURE — C9290 INJ, BUPIVACAINE LIPOSOME: HCPCS | Performed by: COLON & RECTAL SURGERY

## 2021-06-08 PROCEDURE — 88307 TISSUE EXAM BY PATHOLOGIST: CPT | Performed by: PATHOLOGY

## 2021-06-08 PROCEDURE — 88305 TISSUE EXAM BY PATHOLOGIST: ICD-10-PCS | Mod: 26,,, | Performed by: PATHOLOGY

## 2021-06-08 PROCEDURE — 37000008 HC ANESTHESIA 1ST 15 MINUTES: Performed by: COLON & RECTAL SURGERY

## 2021-06-08 PROCEDURE — 44213 LAP MOBIL SPLENIC FL ADD-ON: CPT | Mod: ,,, | Performed by: COLON & RECTAL SURGERY

## 2021-06-08 PROCEDURE — 99900035 HC TECH TIME PER 15 MIN (STAT)

## 2021-06-08 PROCEDURE — 71000039 HC RECOVERY, EACH ADD'L HOUR: Performed by: COLON & RECTAL SURGERY

## 2021-06-08 PROCEDURE — 25000003 PHARM REV CODE 250: Performed by: STUDENT IN AN ORGANIZED HEALTH CARE EDUCATION/TRAINING PROGRAM

## 2021-06-08 PROCEDURE — 88305 TISSUE EXAM BY PATHOLOGIST: CPT | Mod: 26,,, | Performed by: PATHOLOGY

## 2021-06-08 PROCEDURE — 71000033 HC RECOVERY, INTIAL HOUR: Performed by: COLON & RECTAL SURGERY

## 2021-06-08 RX ORDER — HEPARIN SODIUM 5000 [USP'U]/ML
5000 INJECTION, SOLUTION INTRAVENOUS; SUBCUTANEOUS
Status: COMPLETED | OUTPATIENT
Start: 2021-06-08 | End: 2021-06-08

## 2021-06-08 RX ORDER — SODIUM CHLORIDE 9 MG/ML
INJECTION, SOLUTION INTRAVENOUS CONTINUOUS
Status: CANCELLED | OUTPATIENT
Start: 2021-06-08

## 2021-06-08 RX ORDER — HYDROMORPHONE HYDROCHLORIDE 2 MG/ML
0.2 INJECTION, SOLUTION INTRAMUSCULAR; INTRAVENOUS; SUBCUTANEOUS EVERY 5 MIN PRN
Status: DISCONTINUED | OUTPATIENT
Start: 2021-06-08 | End: 2021-06-08 | Stop reason: HOSPADM

## 2021-06-08 RX ORDER — ACETAMINOPHEN 500 MG
1000 TABLET ORAL ONCE
Status: COMPLETED | OUTPATIENT
Start: 2021-06-08 | End: 2021-06-08

## 2021-06-08 RX ORDER — NEOSTIGMINE METHYLSULFATE 1 MG/ML
INJECTION, SOLUTION INTRAVENOUS
Status: DISCONTINUED | OUTPATIENT
Start: 2021-06-08 | End: 2021-06-08

## 2021-06-08 RX ORDER — PROPOFOL 10 MG/ML
VIAL (ML) INTRAVENOUS
Status: DISCONTINUED | OUTPATIENT
Start: 2021-06-08 | End: 2021-06-08

## 2021-06-08 RX ORDER — DIPHENHYDRAMINE HYDROCHLORIDE 50 MG/ML
12.5 INJECTION INTRAMUSCULAR; INTRAVENOUS EVERY 6 HOURS PRN
Status: DISCONTINUED | OUTPATIENT
Start: 2021-06-08 | End: 2021-06-10 | Stop reason: HOSPADM

## 2021-06-08 RX ORDER — MIDAZOLAM HYDROCHLORIDE 1 MG/ML
INJECTION, SOLUTION INTRAMUSCULAR; INTRAVENOUS
Status: DISCONTINUED | OUTPATIENT
Start: 2021-06-08 | End: 2021-06-08

## 2021-06-08 RX ORDER — AMOXICILLIN 250 MG
1 CAPSULE ORAL 2 TIMES DAILY
Status: DISCONTINUED | OUTPATIENT
Start: 2021-06-08 | End: 2021-06-10 | Stop reason: HOSPADM

## 2021-06-08 RX ORDER — ENOXAPARIN SODIUM 100 MG/ML
40 INJECTION SUBCUTANEOUS EVERY 24 HOURS
Status: DISCONTINUED | OUTPATIENT
Start: 2021-06-09 | End: 2021-06-10 | Stop reason: HOSPADM

## 2021-06-08 RX ORDER — FENTANYL CITRATE 50 UG/ML
INJECTION, SOLUTION INTRAMUSCULAR; INTRAVENOUS
Status: DISCONTINUED | OUTPATIENT
Start: 2021-06-08 | End: 2021-06-08

## 2021-06-08 RX ORDER — METRONIDAZOLE 500 MG/100ML
500 INJECTION, SOLUTION INTRAVENOUS
Status: COMPLETED | OUTPATIENT
Start: 2021-06-08 | End: 2021-06-08

## 2021-06-08 RX ORDER — SODIUM CHLORIDE, SODIUM LACTATE, POTASSIUM CHLORIDE, CALCIUM CHLORIDE 600; 310; 30; 20 MG/100ML; MG/100ML; MG/100ML; MG/100ML
INJECTION, SOLUTION INTRAVENOUS CONTINUOUS
Status: DISCONTINUED | OUTPATIENT
Start: 2021-06-08 | End: 2021-06-08

## 2021-06-08 RX ORDER — MORPHINE SULFATE 4 MG/ML
2 INJECTION, SOLUTION INTRAMUSCULAR; INTRAVENOUS
Status: DISCONTINUED | OUTPATIENT
Start: 2021-06-08 | End: 2021-06-10 | Stop reason: HOSPADM

## 2021-06-08 RX ORDER — ACETAMINOPHEN 10 MG/ML
1000 INJECTION, SOLUTION INTRAVENOUS EVERY 8 HOURS
Status: DISPENSED | OUTPATIENT
Start: 2021-06-08 | End: 2021-06-09

## 2021-06-08 RX ORDER — GABAPENTIN 300 MG/1
300 CAPSULE ORAL 3 TIMES DAILY
Status: DISCONTINUED | OUTPATIENT
Start: 2021-06-08 | End: 2021-06-10 | Stop reason: HOSPADM

## 2021-06-08 RX ORDER — SODIUM CHLORIDE 0.9 % (FLUSH) 0.9 %
3 SYRINGE (ML) INJECTION EVERY 8 HOURS
Status: DISCONTINUED | OUTPATIENT
Start: 2021-06-08 | End: 2021-06-08 | Stop reason: HOSPADM

## 2021-06-08 RX ORDER — PROMETHAZINE HYDROCHLORIDE 25 MG/1
25 TABLET ORAL EVERY 6 HOURS PRN
Status: DISCONTINUED | OUTPATIENT
Start: 2021-06-08 | End: 2021-06-10 | Stop reason: HOSPADM

## 2021-06-08 RX ORDER — LIDOCAINE HYDROCHLORIDE 10 MG/ML
INJECTION, SOLUTION EPIDURAL; INFILTRATION; INTRACAUDAL; PERINEURAL
Status: DISCONTINUED | OUTPATIENT
Start: 2021-06-08 | End: 2021-06-08

## 2021-06-08 RX ORDER — SODIUM CHLORIDE, SODIUM LACTATE, POTASSIUM CHLORIDE, CALCIUM CHLORIDE 600; 310; 30; 20 MG/100ML; MG/100ML; MG/100ML; MG/100ML
INJECTION, SOLUTION INTRAVENOUS CONTINUOUS
Status: DISCONTINUED | OUTPATIENT
Start: 2021-06-08 | End: 2021-06-09

## 2021-06-08 RX ORDER — METOCLOPRAMIDE HYDROCHLORIDE 5 MG/ML
10 INJECTION INTRAMUSCULAR; INTRAVENOUS EVERY 10 MIN PRN
Status: DISCONTINUED | OUTPATIENT
Start: 2021-06-08 | End: 2021-06-08 | Stop reason: HOSPADM

## 2021-06-08 RX ORDER — OXYCODONE HYDROCHLORIDE 5 MG/1
10 TABLET ORAL EVERY 4 HOURS PRN
Status: DISCONTINUED | OUTPATIENT
Start: 2021-06-08 | End: 2021-06-10 | Stop reason: HOSPADM

## 2021-06-08 RX ORDER — DEXAMETHASONE SODIUM PHOSPHATE 4 MG/ML
INJECTION, SOLUTION INTRA-ARTICULAR; INTRALESIONAL; INTRAMUSCULAR; INTRAVENOUS; SOFT TISSUE
Status: DISCONTINUED | OUTPATIENT
Start: 2021-06-08 | End: 2021-06-08

## 2021-06-08 RX ORDER — ONDANSETRON 2 MG/ML
4 INJECTION INTRAMUSCULAR; INTRAVENOUS EVERY 6 HOURS PRN
Status: DISCONTINUED | OUTPATIENT
Start: 2021-06-08 | End: 2021-06-08

## 2021-06-08 RX ORDER — DIPHENHYDRAMINE HYDROCHLORIDE 50 MG/ML
25 INJECTION INTRAMUSCULAR; INTRAVENOUS EVERY 6 HOURS PRN
Status: DISCONTINUED | OUTPATIENT
Start: 2021-06-08 | End: 2021-06-08 | Stop reason: HOSPADM

## 2021-06-08 RX ORDER — BUPIVACAINE HYDROCHLORIDE 2.5 MG/ML
INJECTION, SOLUTION EPIDURAL; INFILTRATION; INTRACAUDAL
Status: DISCONTINUED | OUTPATIENT
Start: 2021-06-08 | End: 2021-06-08 | Stop reason: HOSPADM

## 2021-06-08 RX ORDER — SUCCINYLCHOLINE CHLORIDE 20 MG/ML
INJECTION INTRAMUSCULAR; INTRAVENOUS
Status: DISCONTINUED | OUTPATIENT
Start: 2021-06-08 | End: 2021-06-08

## 2021-06-08 RX ORDER — AMLODIPINE BESYLATE 5 MG/1
5 TABLET ORAL DAILY
Status: DISCONTINUED | OUTPATIENT
Start: 2021-06-09 | End: 2021-06-10 | Stop reason: HOSPADM

## 2021-06-08 RX ORDER — ROCURONIUM BROMIDE 10 MG/ML
INJECTION, SOLUTION INTRAVENOUS
Status: DISCONTINUED | OUTPATIENT
Start: 2021-06-08 | End: 2021-06-08

## 2021-06-08 RX ORDER — ONDANSETRON 2 MG/ML
INJECTION INTRAMUSCULAR; INTRAVENOUS
Status: DISCONTINUED | OUTPATIENT
Start: 2021-06-08 | End: 2021-06-08

## 2021-06-08 RX ORDER — CHLORHEXIDINE GLUCONATE ORAL RINSE 1.2 MG/ML
10 SOLUTION DENTAL 2 TIMES DAILY
Status: DISCONTINUED | OUTPATIENT
Start: 2021-06-08 | End: 2021-06-10 | Stop reason: HOSPADM

## 2021-06-08 RX ORDER — MEPERIDINE HYDROCHLORIDE 25 MG/ML
12.5 INJECTION INTRAMUSCULAR; INTRAVENOUS; SUBCUTANEOUS ONCE AS NEEDED
Status: DISCONTINUED | OUTPATIENT
Start: 2021-06-08 | End: 2021-06-08 | Stop reason: HOSPADM

## 2021-06-08 RX ORDER — CELECOXIB 100 MG/1
400 CAPSULE ORAL
Status: COMPLETED | OUTPATIENT
Start: 2021-06-08 | End: 2021-06-08

## 2021-06-08 RX ORDER — OXYCODONE HYDROCHLORIDE 5 MG/1
5 TABLET ORAL EVERY 4 HOURS PRN
Status: DISCONTINUED | OUTPATIENT
Start: 2021-06-08 | End: 2021-06-10 | Stop reason: HOSPADM

## 2021-06-08 RX ORDER — ONDANSETRON 2 MG/ML
4 INJECTION INTRAMUSCULAR; INTRAVENOUS EVERY 6 HOURS PRN
Status: DISCONTINUED | OUTPATIENT
Start: 2021-06-08 | End: 2021-06-10 | Stop reason: HOSPADM

## 2021-06-08 RX ORDER — GABAPENTIN 400 MG/1
800 CAPSULE ORAL
Status: COMPLETED | OUTPATIENT
Start: 2021-06-08 | End: 2021-06-08

## 2021-06-08 RX ORDER — PANTOPRAZOLE SODIUM 40 MG/1
40 TABLET, DELAYED RELEASE ORAL DAILY
Status: DISCONTINUED | OUTPATIENT
Start: 2021-06-08 | End: 2021-06-10 | Stop reason: HOSPADM

## 2021-06-08 RX ADMIN — GABAPENTIN 300 MG: 300 CAPSULE ORAL at 08:06

## 2021-06-08 RX ADMIN — HYDROMORPHONE HYDROCHLORIDE 0.2 MG: 2 INJECTION INTRAMUSCULAR; INTRAVENOUS; SUBCUTANEOUS at 12:06

## 2021-06-08 RX ADMIN — LIDOCAINE HYDROCHLORIDE 100 MG: 10 INJECTION, SOLUTION EPIDURAL; INFILTRATION; INTRACAUDAL; PERINEURAL at 07:06

## 2021-06-08 RX ADMIN — CELECOXIB 400 MG: 100 CAPSULE ORAL at 05:06

## 2021-06-08 RX ADMIN — ROCURONIUM BROMIDE 25 MG: 10 INJECTION, SOLUTION INTRAVENOUS at 07:06

## 2021-06-08 RX ADMIN — ACETAMINOPHEN 1000 MG: 10 INJECTION, SOLUTION INTRAVENOUS at 10:06

## 2021-06-08 RX ADMIN — ROCURONIUM BROMIDE 20 MG: 10 INJECTION, SOLUTION INTRAVENOUS at 07:06

## 2021-06-08 RX ADMIN — SODIUM CHLORIDE, SODIUM LACTATE, POTASSIUM CHLORIDE, AND CALCIUM CHLORIDE: .6; .31; .03; .02 INJECTION, SOLUTION INTRAVENOUS at 09:06

## 2021-06-08 RX ADMIN — 0.12% CHLORHEXIDINE GLUCONATE 10 ML: 1.2 RINSE ORAL at 08:06

## 2021-06-08 RX ADMIN — DOCUSATE SODIUM - SENNOSIDES 1 TABLET: 50; 8.6 TABLET, FILM COATED ORAL at 08:06

## 2021-06-08 RX ADMIN — FENTANYL CITRATE 100 MCG: 50 INJECTION, SOLUTION INTRAMUSCULAR; INTRAVENOUS at 08:06

## 2021-06-08 RX ADMIN — NEOSTIGMINE METHYLSULFATE 5 MG: 1 INJECTION INTRAVENOUS at 10:06

## 2021-06-08 RX ADMIN — PROPOFOL 180 MG: 10 INJECTION, EMULSION INTRAVENOUS at 07:06

## 2021-06-08 RX ADMIN — METRONIDAZOLE 250 MG: 500 SOLUTION INTRAVENOUS at 10:06

## 2021-06-08 RX ADMIN — MIDAZOLAM HYDROCHLORIDE 2 MG: 1 INJECTION, SOLUTION INTRAMUSCULAR; INTRAVENOUS at 07:06

## 2021-06-08 RX ADMIN — GLYCOPYRROLATE 0.8 MG: 0.2 INJECTION, SOLUTION INTRAMUSCULAR; INTRAVENOUS at 10:06

## 2021-06-08 RX ADMIN — FENTANYL CITRATE 100 MCG: 50 INJECTION, SOLUTION INTRAMUSCULAR; INTRAVENOUS at 09:06

## 2021-06-08 RX ADMIN — ROCURONIUM BROMIDE 20 MG: 10 INJECTION, SOLUTION INTRAVENOUS at 09:06

## 2021-06-08 RX ADMIN — ROCURONIUM BROMIDE 30 MG: 10 INJECTION, SOLUTION INTRAVENOUS at 09:06

## 2021-06-08 RX ADMIN — ROCURONIUM BROMIDE 20 MG: 10 INJECTION, SOLUTION INTRAVENOUS at 08:06

## 2021-06-08 RX ADMIN — SODIUM CHLORIDE, SODIUM LACTATE, POTASSIUM CHLORIDE, AND CALCIUM CHLORIDE: .6; .31; .03; .02 INJECTION, SOLUTION INTRAVENOUS at 07:06

## 2021-06-08 RX ADMIN — ACETAMINOPHEN 1000 MG: 10 INJECTION, SOLUTION INTRAVENOUS at 01:06

## 2021-06-08 RX ADMIN — ROCURONIUM BROMIDE 10 MG: 10 INJECTION, SOLUTION INTRAVENOUS at 08:06

## 2021-06-08 RX ADMIN — OXYCODONE 10 MG: 5 TABLET ORAL at 07:06

## 2021-06-08 RX ADMIN — ACETAMINOPHEN 1000 MG: 500 TABLET ORAL at 05:06

## 2021-06-08 RX ADMIN — GABAPENTIN 800 MG: 400 CAPSULE ORAL at 05:06

## 2021-06-08 RX ADMIN — SUCCINYLCHOLINE CHLORIDE 200 MG: 20 INJECTION, SOLUTION INTRAMUSCULAR; INTRAVENOUS at 07:06

## 2021-06-08 RX ADMIN — SODIUM CHLORIDE, SODIUM LACTATE, POTASSIUM CHLORIDE, AND CALCIUM CHLORIDE: .6; .31; .03; .02 INJECTION, SOLUTION INTRAVENOUS at 01:06

## 2021-06-08 RX ADMIN — PANTOPRAZOLE SODIUM 40 MG: 40 TABLET, DELAYED RELEASE ORAL at 01:06

## 2021-06-08 RX ADMIN — METRONIDAZOLE 500 MG: 500 SOLUTION INTRAVENOUS at 07:06

## 2021-06-08 RX ADMIN — DEXAMETHASONE SODIUM PHOSPHATE 4 MG: 4 INJECTION, SOLUTION INTRAMUSCULAR; INTRAVENOUS at 07:06

## 2021-06-08 RX ADMIN — FENTANYL CITRATE 50 MCG: 50 INJECTION, SOLUTION INTRAMUSCULAR; INTRAVENOUS at 08:06

## 2021-06-08 RX ADMIN — OXYCODONE 10 MG: 5 TABLET ORAL at 10:06

## 2021-06-08 RX ADMIN — ROCURONIUM BROMIDE 5 MG: 10 INJECTION, SOLUTION INTRAVENOUS at 07:06

## 2021-06-08 RX ADMIN — ONDANSETRON 4 MG: 2 INJECTION, SOLUTION INTRAMUSCULAR; INTRAVENOUS at 10:06

## 2021-06-08 RX ADMIN — GABAPENTIN 300 MG: 300 CAPSULE ORAL at 04:06

## 2021-06-08 RX ADMIN — FENTANYL CITRATE 50 MCG: 50 INJECTION, SOLUTION INTRAMUSCULAR; INTRAVENOUS at 09:06

## 2021-06-08 RX ADMIN — CEFTRIAXONE SODIUM 2 G: 2 INJECTION, SOLUTION INTRAVENOUS at 07:06

## 2021-06-08 RX ADMIN — HEPARIN SODIUM 5000 UNITS: 5000 INJECTION INTRAVENOUS; SUBCUTANEOUS at 05:06

## 2021-06-08 RX ADMIN — FENTANYL CITRATE 100 MCG: 50 INJECTION, SOLUTION INTRAMUSCULAR; INTRAVENOUS at 07:06

## 2021-06-09 ENCOUNTER — PATIENT MESSAGE (OUTPATIENT)
Dept: SURGERY | Facility: CLINIC | Age: 56
End: 2021-06-09

## 2021-06-09 LAB
ANION GAP SERPL CALC-SCNC: 11 MMOL/L (ref 8–16)
BASOPHILS # BLD AUTO: 0.02 K/UL (ref 0–0.2)
BASOPHILS NFR BLD: 0.2 % (ref 0–1.9)
BUN SERPL-MCNC: 10 MG/DL (ref 6–20)
CALCIUM SERPL-MCNC: 8.6 MG/DL (ref 8.7–10.5)
CHLORIDE SERPL-SCNC: 103 MMOL/L (ref 95–110)
CO2 SERPL-SCNC: 27 MMOL/L (ref 23–29)
CREAT SERPL-MCNC: 0.8 MG/DL (ref 0.5–1.4)
DIFFERENTIAL METHOD: ABNORMAL
EOSINOPHIL # BLD AUTO: 0 K/UL (ref 0–0.5)
EOSINOPHIL NFR BLD: 0.3 % (ref 0–8)
ERYTHROCYTE [DISTWIDTH] IN BLOOD BY AUTOMATED COUNT: 12.1 % (ref 11.5–14.5)
EST. GFR  (AFRICAN AMERICAN): >60 ML/MIN/1.73 M^2
EST. GFR  (NON AFRICAN AMERICAN): >60 ML/MIN/1.73 M^2
GLUCOSE SERPL-MCNC: 108 MG/DL (ref 70–110)
HCT VFR BLD AUTO: 44.1 % (ref 40–54)
HGB BLD-MCNC: 14.3 G/DL (ref 14–18)
IMM GRANULOCYTES # BLD AUTO: 0.05 K/UL (ref 0–0.04)
IMM GRANULOCYTES NFR BLD AUTO: 0.4 % (ref 0–0.5)
LYMPHOCYTES # BLD AUTO: 1.5 K/UL (ref 1–4.8)
LYMPHOCYTES NFR BLD: 13.2 % (ref 18–48)
MAGNESIUM SERPL-MCNC: 2.2 MG/DL (ref 1.6–2.6)
MCH RBC QN AUTO: 29.1 PG (ref 27–31)
MCHC RBC AUTO-ENTMCNC: 32.4 G/DL (ref 32–36)
MCV RBC AUTO: 90 FL (ref 82–98)
MONOCYTES # BLD AUTO: 1.2 K/UL (ref 0.3–1)
MONOCYTES NFR BLD: 10.6 % (ref 4–15)
NEUTROPHILS # BLD AUTO: 8.8 K/UL (ref 1.8–7.7)
NEUTROPHILS NFR BLD: 75.3 % (ref 38–73)
NRBC BLD-RTO: 0 /100 WBC
PHOSPHATE SERPL-MCNC: 3.2 MG/DL (ref 2.7–4.5)
PLATELET # BLD AUTO: 241 K/UL (ref 150–450)
PMV BLD AUTO: 10.8 FL (ref 9.2–12.9)
POTASSIUM SERPL-SCNC: 4.2 MMOL/L (ref 3.5–5.1)
RBC # BLD AUTO: 4.91 M/UL (ref 4.6–6.2)
SODIUM SERPL-SCNC: 141 MMOL/L (ref 136–145)
WBC # BLD AUTO: 11.63 K/UL (ref 3.9–12.7)

## 2021-06-09 PROCEDURE — 85025 COMPLETE CBC W/AUTO DIFF WBC: CPT | Performed by: COLON & RECTAL SURGERY

## 2021-06-09 PROCEDURE — 63600175 PHARM REV CODE 636 W HCPCS: Performed by: COLON & RECTAL SURGERY

## 2021-06-09 PROCEDURE — 25000003 PHARM REV CODE 250: Performed by: COLON & RECTAL SURGERY

## 2021-06-09 PROCEDURE — 80048 BASIC METABOLIC PNL TOTAL CA: CPT | Performed by: COLON & RECTAL SURGERY

## 2021-06-09 PROCEDURE — 99024 PR POST-OP FOLLOW-UP VISIT: ICD-10-PCS | Mod: ,,, | Performed by: COLON & RECTAL SURGERY

## 2021-06-09 PROCEDURE — 36415 COLL VENOUS BLD VENIPUNCTURE: CPT | Performed by: COLON & RECTAL SURGERY

## 2021-06-09 PROCEDURE — 94799 UNLISTED PULMONARY SVC/PX: CPT

## 2021-06-09 PROCEDURE — 84100 ASSAY OF PHOSPHORUS: CPT | Performed by: COLON & RECTAL SURGERY

## 2021-06-09 PROCEDURE — 83735 ASSAY OF MAGNESIUM: CPT | Performed by: COLON & RECTAL SURGERY

## 2021-06-09 PROCEDURE — 11000001 HC ACUTE MED/SURG PRIVATE ROOM

## 2021-06-09 PROCEDURE — 99024 POSTOP FOLLOW-UP VISIT: CPT | Mod: ,,, | Performed by: COLON & RECTAL SURGERY

## 2021-06-09 RX ADMIN — GABAPENTIN 300 MG: 300 CAPSULE ORAL at 08:06

## 2021-06-09 RX ADMIN — DOCUSATE SODIUM - SENNOSIDES 1 TABLET: 50; 8.6 TABLET, FILM COATED ORAL at 08:06

## 2021-06-09 RX ADMIN — 0.12% CHLORHEXIDINE GLUCONATE 10 ML: 1.2 RINSE ORAL at 08:06

## 2021-06-09 RX ADMIN — PANTOPRAZOLE SODIUM 40 MG: 40 TABLET, DELAYED RELEASE ORAL at 08:06

## 2021-06-09 RX ADMIN — MORPHINE SULFATE 2 MG: 4 INJECTION, SOLUTION INTRAMUSCULAR; INTRAVENOUS at 12:06

## 2021-06-09 RX ADMIN — OXYCODONE 10 MG: 5 TABLET ORAL at 07:06

## 2021-06-09 RX ADMIN — ENOXAPARIN SODIUM 40 MG: 40 INJECTION SUBCUTANEOUS at 05:06

## 2021-06-09 RX ADMIN — OXYCODONE 10 MG: 5 TABLET ORAL at 02:06

## 2021-06-09 RX ADMIN — GABAPENTIN 300 MG: 300 CAPSULE ORAL at 03:06

## 2021-06-09 RX ADMIN — SODIUM CHLORIDE, SODIUM LACTATE, POTASSIUM CHLORIDE, AND CALCIUM CHLORIDE: .6; .31; .03; .02 INJECTION, SOLUTION INTRAVENOUS at 02:06

## 2021-06-09 RX ADMIN — OXYCODONE 10 MG: 5 TABLET ORAL at 03:06

## 2021-06-09 RX ADMIN — OXYCODONE 10 MG: 5 TABLET ORAL at 06:06

## 2021-06-09 RX ADMIN — OXYCODONE 10 MG: 5 TABLET ORAL at 11:06

## 2021-06-09 RX ADMIN — AMLODIPINE BESYLATE 5 MG: 5 TABLET ORAL at 08:06

## 2021-06-10 ENCOUNTER — TELEPHONE (OUTPATIENT)
Dept: SURGERY | Facility: CLINIC | Age: 56
End: 2021-06-10

## 2021-06-10 ENCOUNTER — PATIENT MESSAGE (OUTPATIENT)
Dept: SURGERY | Facility: CLINIC | Age: 56
End: 2021-06-10

## 2021-06-10 VITALS
DIASTOLIC BLOOD PRESSURE: 77 MMHG | OXYGEN SATURATION: 95 % | BODY MASS INDEX: 30.56 KG/M2 | RESPIRATION RATE: 18 BRPM | TEMPERATURE: 97 F | SYSTOLIC BLOOD PRESSURE: 123 MMHG | WEIGHT: 238.13 LBS | HEART RATE: 64 BPM | HEIGHT: 74 IN

## 2021-06-10 LAB
ANION GAP SERPL CALC-SCNC: 12 MMOL/L (ref 8–16)
BASOPHILS # BLD AUTO: 0.04 K/UL (ref 0–0.2)
BASOPHILS NFR BLD: 0.5 % (ref 0–1.9)
BUN SERPL-MCNC: 10 MG/DL (ref 6–20)
CALCIUM SERPL-MCNC: 9.1 MG/DL (ref 8.7–10.5)
CHLORIDE SERPL-SCNC: 104 MMOL/L (ref 95–110)
CO2 SERPL-SCNC: 26 MMOL/L (ref 23–29)
CREAT SERPL-MCNC: 0.9 MG/DL (ref 0.5–1.4)
DIFFERENTIAL METHOD: NORMAL
EOSINOPHIL # BLD AUTO: 0.2 K/UL (ref 0–0.5)
EOSINOPHIL NFR BLD: 2.8 % (ref 0–8)
ERYTHROCYTE [DISTWIDTH] IN BLOOD BY AUTOMATED COUNT: 12 % (ref 11.5–14.5)
EST. GFR  (AFRICAN AMERICAN): >60 ML/MIN/1.73 M^2
EST. GFR  (NON AFRICAN AMERICAN): >60 ML/MIN/1.73 M^2
GLUCOSE SERPL-MCNC: 108 MG/DL (ref 70–110)
HCT VFR BLD AUTO: 45.9 % (ref 40–54)
HGB BLD-MCNC: 15.1 G/DL (ref 14–18)
IMM GRANULOCYTES # BLD AUTO: 0.03 K/UL (ref 0–0.04)
IMM GRANULOCYTES NFR BLD AUTO: 0.4 % (ref 0–0.5)
LYMPHOCYTES # BLD AUTO: 2.4 K/UL (ref 1–4.8)
LYMPHOCYTES NFR BLD: 29.2 % (ref 18–48)
MAGNESIUM SERPL-MCNC: 2.3 MG/DL (ref 1.6–2.6)
MCH RBC QN AUTO: 29.6 PG (ref 27–31)
MCHC RBC AUTO-ENTMCNC: 32.9 G/DL (ref 32–36)
MCV RBC AUTO: 90 FL (ref 82–98)
MONOCYTES # BLD AUTO: 0.8 K/UL (ref 0.3–1)
MONOCYTES NFR BLD: 9.5 % (ref 4–15)
NEUTROPHILS # BLD AUTO: 4.8 K/UL (ref 1.8–7.7)
NEUTROPHILS NFR BLD: 57.6 % (ref 38–73)
NRBC BLD-RTO: 0 /100 WBC
PHOSPHATE SERPL-MCNC: 3 MG/DL (ref 2.7–4.5)
PLATELET # BLD AUTO: 248 K/UL (ref 150–450)
PMV BLD AUTO: 11.2 FL (ref 9.2–12.9)
POTASSIUM SERPL-SCNC: 4.3 MMOL/L (ref 3.5–5.1)
RBC # BLD AUTO: 5.1 M/UL (ref 4.6–6.2)
SODIUM SERPL-SCNC: 142 MMOL/L (ref 136–145)
WBC # BLD AUTO: 8.35 K/UL (ref 3.9–12.7)

## 2021-06-10 PROCEDURE — 84100 ASSAY OF PHOSPHORUS: CPT | Performed by: COLON & RECTAL SURGERY

## 2021-06-10 PROCEDURE — 25000003 PHARM REV CODE 250: Performed by: COLON & RECTAL SURGERY

## 2021-06-10 PROCEDURE — 63600175 PHARM REV CODE 636 W HCPCS: Performed by: COLON & RECTAL SURGERY

## 2021-06-10 PROCEDURE — 99024 PR POST-OP FOLLOW-UP VISIT: ICD-10-PCS | Mod: ,,, | Performed by: COLON & RECTAL SURGERY

## 2021-06-10 PROCEDURE — 99024 POSTOP FOLLOW-UP VISIT: CPT | Mod: ,,, | Performed by: COLON & RECTAL SURGERY

## 2021-06-10 PROCEDURE — 83735 ASSAY OF MAGNESIUM: CPT | Performed by: COLON & RECTAL SURGERY

## 2021-06-10 PROCEDURE — 94799 UNLISTED PULMONARY SVC/PX: CPT

## 2021-06-10 PROCEDURE — 36415 COLL VENOUS BLD VENIPUNCTURE: CPT | Performed by: COLON & RECTAL SURGERY

## 2021-06-10 PROCEDURE — 94761 N-INVAS EAR/PLS OXIMETRY MLT: CPT

## 2021-06-10 PROCEDURE — 85025 COMPLETE CBC W/AUTO DIFF WBC: CPT | Performed by: COLON & RECTAL SURGERY

## 2021-06-10 PROCEDURE — 80048 BASIC METABOLIC PNL TOTAL CA: CPT | Performed by: COLON & RECTAL SURGERY

## 2021-06-10 RX ORDER — AMOXICILLIN 250 MG
1 CAPSULE ORAL 2 TIMES DAILY
Qty: 60 TABLET | Refills: 0 | Status: SHIPPED | OUTPATIENT
Start: 2021-06-10 | End: 2021-07-10

## 2021-06-10 RX ORDER — OXYCODONE HYDROCHLORIDE 5 MG/1
5 TABLET ORAL EVERY 6 HOURS PRN
Qty: 15 TABLET | Refills: 0 | Status: SHIPPED | OUTPATIENT
Start: 2021-06-10 | End: 2021-10-02

## 2021-06-10 RX ADMIN — OXYCODONE 5 MG: 5 TABLET ORAL at 11:06

## 2021-06-10 RX ADMIN — OXYCODONE 10 MG: 5 TABLET ORAL at 01:06

## 2021-06-10 RX ADMIN — 0.12% CHLORHEXIDINE GLUCONATE 10 ML: 1.2 RINSE ORAL at 08:06

## 2021-06-10 RX ADMIN — GABAPENTIN 300 MG: 300 CAPSULE ORAL at 08:06

## 2021-06-10 RX ADMIN — AMLODIPINE BESYLATE 5 MG: 5 TABLET ORAL at 08:06

## 2021-06-10 RX ADMIN — PANTOPRAZOLE SODIUM 40 MG: 40 TABLET, DELAYED RELEASE ORAL at 08:06

## 2021-06-10 RX ADMIN — DOCUSATE SODIUM - SENNOSIDES 1 TABLET: 50; 8.6 TABLET, FILM COATED ORAL at 08:06

## 2021-06-10 RX ADMIN — MORPHINE SULFATE 2 MG: 4 INJECTION, SOLUTION INTRAMUSCULAR; INTRAVENOUS at 03:06

## 2021-06-14 ENCOUNTER — PATIENT OUTREACH (OUTPATIENT)
Dept: ADMINISTRATIVE | Facility: HOSPITAL | Age: 56
End: 2021-06-14

## 2021-06-14 LAB
FINAL PATHOLOGIC DIAGNOSIS: NORMAL
GROSS: NORMAL
Lab: NORMAL

## 2021-06-15 ENCOUNTER — OFFICE VISIT (OUTPATIENT)
Dept: INTERNAL MEDICINE | Facility: CLINIC | Age: 56
End: 2021-06-15
Payer: COMMERCIAL

## 2021-06-15 VITALS
HEART RATE: 123 BPM | WEIGHT: 228.81 LBS | OXYGEN SATURATION: 96 % | DIASTOLIC BLOOD PRESSURE: 88 MMHG | HEIGHT: 74 IN | SYSTOLIC BLOOD PRESSURE: 124 MMHG | TEMPERATURE: 99 F | BODY MASS INDEX: 29.37 KG/M2

## 2021-06-15 DIAGNOSIS — Z87.19 HISTORY OF DIVERTICULITIS: Primary | ICD-10-CM

## 2021-06-15 DIAGNOSIS — Z90.49 STATUS POST COLECTOMY: ICD-10-CM

## 2021-06-15 PROCEDURE — 1126F AMNT PAIN NOTED NONE PRSNT: CPT | Mod: S$GLB,,, | Performed by: PHYSICIAN ASSISTANT

## 2021-06-15 PROCEDURE — 99213 PR OFFICE/OUTPT VISIT, EST, LEVL III, 20-29 MIN: ICD-10-PCS | Mod: S$GLB,,, | Performed by: PHYSICIAN ASSISTANT

## 2021-06-15 PROCEDURE — 1126F PR PAIN SEVERITY QUANTIFIED, NO PAIN PRESENT: ICD-10-PCS | Mod: S$GLB,,, | Performed by: PHYSICIAN ASSISTANT

## 2021-06-15 PROCEDURE — 3008F BODY MASS INDEX DOCD: CPT | Mod: CPTII,S$GLB,, | Performed by: PHYSICIAN ASSISTANT

## 2021-06-15 PROCEDURE — 99213 OFFICE O/P EST LOW 20 MIN: CPT | Mod: S$GLB,,, | Performed by: PHYSICIAN ASSISTANT

## 2021-06-15 PROCEDURE — 3008F PR BODY MASS INDEX (BMI) DOCUMENTED: ICD-10-PCS | Mod: CPTII,S$GLB,, | Performed by: PHYSICIAN ASSISTANT

## 2021-06-15 PROCEDURE — 99999 PR PBB SHADOW E&M-EST. PATIENT-LVL III: ICD-10-PCS | Mod: PBBFAC,,, | Performed by: PHYSICIAN ASSISTANT

## 2021-06-15 PROCEDURE — 1111F DSCHRG MED/CURRENT MED MERGE: CPT | Mod: CPTII,S$GLB,, | Performed by: PHYSICIAN ASSISTANT

## 2021-06-15 PROCEDURE — 99999 PR PBB SHADOW E&M-EST. PATIENT-LVL III: CPT | Mod: PBBFAC,,, | Performed by: PHYSICIAN ASSISTANT

## 2021-06-15 PROCEDURE — 1111F PR DISCHARGE MEDS RECONCILED W/ CURRENT OUTPATIENT MED LIST: ICD-10-PCS | Mod: CPTII,S$GLB,, | Performed by: PHYSICIAN ASSISTANT

## 2021-06-28 ENCOUNTER — OFFICE VISIT (OUTPATIENT)
Dept: SURGERY | Facility: CLINIC | Age: 56
End: 2021-06-28
Payer: COMMERCIAL

## 2021-06-28 VITALS
HEART RATE: 66 BPM | WEIGHT: 230.38 LBS | SYSTOLIC BLOOD PRESSURE: 148 MMHG | TEMPERATURE: 99 F | RESPIRATION RATE: 18 BRPM | BODY MASS INDEX: 29.57 KG/M2 | HEIGHT: 74 IN | DIASTOLIC BLOOD PRESSURE: 99 MMHG

## 2021-06-28 DIAGNOSIS — K57.20 DIVERTICULITIS OF LARGE INTESTINE WITH PERFORATION WITHOUT BLEEDING: Primary | ICD-10-CM

## 2021-06-28 PROCEDURE — 3008F BODY MASS INDEX DOCD: CPT | Mod: CPTII,S$GLB,, | Performed by: COLON & RECTAL SURGERY

## 2021-06-28 PROCEDURE — 1125F AMNT PAIN NOTED PAIN PRSNT: CPT | Mod: S$GLB,,, | Performed by: COLON & RECTAL SURGERY

## 2021-06-28 PROCEDURE — 99999 PR PBB SHADOW E&M-EST. PATIENT-LVL III: CPT | Mod: PBBFAC,,, | Performed by: COLON & RECTAL SURGERY

## 2021-06-28 PROCEDURE — 99024 PR POST-OP FOLLOW-UP VISIT: ICD-10-PCS | Mod: S$GLB,,, | Performed by: COLON & RECTAL SURGERY

## 2021-06-28 PROCEDURE — 1125F PR PAIN SEVERITY QUANTIFIED, PAIN PRESENT: ICD-10-PCS | Mod: S$GLB,,, | Performed by: COLON & RECTAL SURGERY

## 2021-06-28 PROCEDURE — 99999 PR PBB SHADOW E&M-EST. PATIENT-LVL III: ICD-10-PCS | Mod: PBBFAC,,, | Performed by: COLON & RECTAL SURGERY

## 2021-06-28 PROCEDURE — 99024 POSTOP FOLLOW-UP VISIT: CPT | Mod: S$GLB,,, | Performed by: COLON & RECTAL SURGERY

## 2021-06-28 PROCEDURE — 3008F PR BODY MASS INDEX (BMI) DOCUMENTED: ICD-10-PCS | Mod: CPTII,S$GLB,, | Performed by: COLON & RECTAL SURGERY

## 2021-06-30 ENCOUNTER — PATIENT MESSAGE (OUTPATIENT)
Dept: INTERNAL MEDICINE | Facility: CLINIC | Age: 56
End: 2021-06-30

## 2021-06-30 DIAGNOSIS — K21.9 GASTROESOPHAGEAL REFLUX DISEASE WITHOUT ESOPHAGITIS: ICD-10-CM

## 2021-06-30 DIAGNOSIS — M54.50 CHRONIC LOW BACK PAIN WITHOUT SCIATICA, UNSPECIFIED BACK PAIN LATERALITY: ICD-10-CM

## 2021-06-30 DIAGNOSIS — G89.29 CHRONIC LOW BACK PAIN WITHOUT SCIATICA, UNSPECIFIED BACK PAIN LATERALITY: ICD-10-CM

## 2021-06-30 DIAGNOSIS — I10 ESSENTIAL HYPERTENSION: ICD-10-CM

## 2021-07-01 RX ORDER — METHOCARBAMOL 500 MG/1
TABLET, FILM COATED ORAL
Qty: 40 TABLET | Refills: 0 | Status: SHIPPED | OUTPATIENT
Start: 2021-07-01 | End: 2022-07-15

## 2021-07-01 RX ORDER — TRAMADOL HYDROCHLORIDE 50 MG/1
50 TABLET ORAL EVERY 4 HOURS PRN
Qty: 40 TABLET | Refills: 0 | Status: SHIPPED | OUTPATIENT
Start: 2021-07-01 | End: 2021-10-01 | Stop reason: SDUPTHER

## 2021-07-01 RX ORDER — PANTOPRAZOLE SODIUM 40 MG/1
40 TABLET, DELAYED RELEASE ORAL DAILY
Qty: 90 TABLET | Refills: 3 | Status: SHIPPED | OUTPATIENT
Start: 2021-07-01 | End: 2022-04-22

## 2021-07-01 RX ORDER — AMLODIPINE BESYLATE 5 MG/1
5 TABLET ORAL DAILY
Qty: 90 TABLET | Refills: 3 | Status: SHIPPED | OUTPATIENT
Start: 2021-07-01 | End: 2021-10-18 | Stop reason: SDUPTHER

## 2021-07-01 RX ORDER — TRAMADOL HYDROCHLORIDE 50 MG/1
TABLET ORAL
Qty: 40 TABLET | Refills: 0 | Status: SHIPPED | OUTPATIENT
Start: 2021-07-01 | End: 2021-10-02 | Stop reason: SDUPTHER

## 2021-09-09 ENCOUNTER — PATIENT MESSAGE (OUTPATIENT)
Dept: INTERNAL MEDICINE | Facility: CLINIC | Age: 56
End: 2021-09-09

## 2021-09-09 ENCOUNTER — PATIENT MESSAGE (OUTPATIENT)
Dept: SURGERY | Facility: CLINIC | Age: 56
End: 2021-09-09

## 2021-10-01 DIAGNOSIS — M54.50 CHRONIC LOW BACK PAIN WITHOUT SCIATICA, UNSPECIFIED BACK PAIN LATERALITY: ICD-10-CM

## 2021-10-01 DIAGNOSIS — G89.29 CHRONIC LOW BACK PAIN WITHOUT SCIATICA, UNSPECIFIED BACK PAIN LATERALITY: ICD-10-CM

## 2021-10-02 RX ORDER — TRAMADOL HYDROCHLORIDE 50 MG/1
TABLET ORAL
Qty: 40 TABLET | Refills: 0 | Status: SHIPPED | OUTPATIENT
Start: 2021-10-02 | End: 2021-11-30 | Stop reason: SDUPTHER

## 2021-10-18 DIAGNOSIS — I10 ESSENTIAL HYPERTENSION: ICD-10-CM

## 2021-10-19 RX ORDER — AMLODIPINE BESYLATE 5 MG/1
5 TABLET ORAL DAILY
Qty: 90 TABLET | Refills: 1 | Status: SHIPPED | OUTPATIENT
Start: 2021-10-19 | End: 2022-07-15 | Stop reason: SDUPTHER

## 2022-02-21 ENCOUNTER — OFFICE VISIT (OUTPATIENT)
Dept: INTERNAL MEDICINE | Facility: CLINIC | Age: 57
End: 2022-02-21
Payer: COMMERCIAL

## 2022-02-21 VITALS
TEMPERATURE: 99 F | WEIGHT: 255.06 LBS | RESPIRATION RATE: 18 BRPM | HEART RATE: 96 BPM | HEIGHT: 74 IN | DIASTOLIC BLOOD PRESSURE: 90 MMHG | BODY MASS INDEX: 32.73 KG/M2 | SYSTOLIC BLOOD PRESSURE: 142 MMHG | OXYGEN SATURATION: 97 %

## 2022-02-21 DIAGNOSIS — M79.601 RIGHT ARM PAIN: Primary | ICD-10-CM

## 2022-02-21 DIAGNOSIS — M79.629 PAIN OF UPPER ARM AFTER TRAUMA: ICD-10-CM

## 2022-02-21 PROCEDURE — 1160F PR REVIEW ALL MEDS BY PRESCRIBER/CLIN PHARMACIST DOCUMENTED: ICD-10-PCS | Mod: CPTII,S$GLB,, | Performed by: INTERNAL MEDICINE

## 2022-02-21 PROCEDURE — 99999 PR PBB SHADOW E&M-EST. PATIENT-LVL IV: CPT | Mod: PBBFAC,,, | Performed by: INTERNAL MEDICINE

## 2022-02-21 PROCEDURE — 1160F RVW MEDS BY RX/DR IN RCRD: CPT | Mod: CPTII,S$GLB,, | Performed by: INTERNAL MEDICINE

## 2022-02-21 PROCEDURE — 1159F PR MEDICATION LIST DOCUMENTED IN MEDICAL RECORD: ICD-10-PCS | Mod: CPTII,S$GLB,, | Performed by: INTERNAL MEDICINE

## 2022-02-21 PROCEDURE — 99214 OFFICE O/P EST MOD 30 MIN: CPT | Mod: S$GLB,,, | Performed by: INTERNAL MEDICINE

## 2022-02-21 PROCEDURE — 3008F BODY MASS INDEX DOCD: CPT | Mod: CPTII,S$GLB,, | Performed by: INTERNAL MEDICINE

## 2022-02-21 PROCEDURE — 1159F MED LIST DOCD IN RCRD: CPT | Mod: CPTII,S$GLB,, | Performed by: INTERNAL MEDICINE

## 2022-02-21 PROCEDURE — 99214 PR OFFICE/OUTPT VISIT, EST, LEVL IV, 30-39 MIN: ICD-10-PCS | Mod: S$GLB,,, | Performed by: INTERNAL MEDICINE

## 2022-02-21 PROCEDURE — 99999 PR PBB SHADOW E&M-EST. PATIENT-LVL IV: ICD-10-PCS | Mod: PBBFAC,,, | Performed by: INTERNAL MEDICINE

## 2022-02-21 PROCEDURE — 3077F PR MOST RECENT SYSTOLIC BLOOD PRESSURE >= 140 MM HG: ICD-10-PCS | Mod: CPTII,S$GLB,, | Performed by: INTERNAL MEDICINE

## 2022-02-21 PROCEDURE — 3080F DIAST BP >= 90 MM HG: CPT | Mod: CPTII,S$GLB,, | Performed by: INTERNAL MEDICINE

## 2022-02-21 PROCEDURE — 3008F PR BODY MASS INDEX (BMI) DOCUMENTED: ICD-10-PCS | Mod: CPTII,S$GLB,, | Performed by: INTERNAL MEDICINE

## 2022-02-21 PROCEDURE — 3077F SYST BP >= 140 MM HG: CPT | Mod: CPTII,S$GLB,, | Performed by: INTERNAL MEDICINE

## 2022-02-21 PROCEDURE — 3080F PR MOST RECENT DIASTOLIC BLOOD PRESSURE >= 90 MM HG: ICD-10-PCS | Mod: CPTII,S$GLB,, | Performed by: INTERNAL MEDICINE

## 2022-02-21 RX ORDER — TRAMADOL HYDROCHLORIDE 50 MG/1
50 TABLET ORAL EVERY 6 HOURS
Qty: 28 TABLET | Refills: 0 | Status: SHIPPED | OUTPATIENT
Start: 2022-02-21 | End: 2022-02-28

## 2022-02-21 NOTE — PROGRESS NOTES
Subjective:       Patient ID: Dimas Darden is a 56 y.o. male.    Chief Complaint: Arm Pain    Arm Pain   The incident occurred 3 to 5 days ago. The incident occurred at home. The injury mechanism was a fall and a direct blow. The pain is present in the upper right arm. The quality of the pain is described as aching. The pain does not radiate. The pain is at a severity of 4/10. The pain is moderate. The pain has been intermittent since the incident. Associated symptoms include muscle weakness. Pertinent negatives include no chest pain, numbness or tingling. The symptoms are aggravated by movement and lifting. He has tried rest and immobilization for the symptoms. The treatment provided mild relief.     Review of Systems   Constitutional: Negative for activity change and unexpected weight change.   HENT: Negative for hearing loss, rhinorrhea and trouble swallowing.    Eyes: Negative for discharge and visual disturbance.   Respiratory: Negative for chest tightness and wheezing.    Cardiovascular: Negative for chest pain and palpitations.   Gastrointestinal: Negative for blood in stool, constipation, diarrhea and vomiting.   Endocrine: Negative for polydipsia and polyuria.   Genitourinary: Negative for difficulty urinating, hematuria and urgency.   Musculoskeletal: Negative for arthralgias, joint swelling and neck pain.   Neurological: Negative for tingling, weakness, numbness and headaches.   Psychiatric/Behavioral: Negative for confusion and dysphoric mood.         Objective:      Physical Exam  Vitals reviewed.   Constitutional:       General: He is not in acute distress.     Appearance: He is well-developed.   Eyes:      General: No scleral icterus.  Cardiovascular:      Rate and Rhythm: Normal rate.      Pulses:           Radial pulses are 2+ on the right side.   Pulmonary:      Effort: Pulmonary effort is normal. No respiratory distress.   Musculoskeletal:      Right upper arm: Deformity and tenderness  present. No lacerations.   Skin:     General: Skin is warm and dry.   Neurological:      Mental Status: He is alert and oriented to person, place, and time.         Assessment:       Problem List Items Addressed This Visit    None     Visit Diagnoses     Right arm pain    -  Primary    Relevant Medications    traMADoL (ULTRAM) 50 mg tablet    Other Relevant Orders    US Soft Tissue Upper Extremity, Right    Pain of upper arm after trauma        Relevant Medications    traMADoL (ULTRAM) 50 mg tablet    Other Relevant Orders    US Soft Tissue Upper Extremity, Right          Plan:       Dimas was seen today for arm pain.    Diagnoses and all orders for this visit:    Right arm pain  -     traMADoL (ULTRAM) 50 mg tablet; Take 1 tablet (50 mg total) by mouth every 6 (six) hours. for 7 days  -     US Soft Tissue Upper Extremity, Right; Future    Pain of upper arm after trauma  -     traMADoL (ULTRAM) 50 mg tablet; Take 1 tablet (50 mg total) by mouth every 6 (six) hours. for 7 days  -     US Soft Tissue Upper Extremity, Right; Future    RTC if symptoms worsen or fail to resolve.

## 2022-02-22 ENCOUNTER — PATIENT MESSAGE (OUTPATIENT)
Dept: INTERNAL MEDICINE | Facility: CLINIC | Age: 57
End: 2022-02-22
Payer: COMMERCIAL

## 2022-02-22 ENCOUNTER — HOSPITAL ENCOUNTER (OUTPATIENT)
Dept: RADIOLOGY | Facility: HOSPITAL | Age: 57
Discharge: HOME | End: 2022-02-22
Attending: INTERNAL MEDICINE
Payer: COMMERCIAL

## 2022-02-22 DIAGNOSIS — M79.629 PAIN OF UPPER ARM AFTER TRAUMA: ICD-10-CM

## 2022-02-22 DIAGNOSIS — M79.629 PAIN OF UPPER ARM AFTER TRAUMA: Primary | ICD-10-CM

## 2022-02-22 DIAGNOSIS — M79.601 RIGHT ARM PAIN: ICD-10-CM

## 2022-02-22 PROCEDURE — 76882 US LMTD JT/FCL EVL NVASC XTR: CPT | Mod: TC,RT

## 2022-02-22 PROCEDURE — 76882 US LMTD JT/FCL EVL NVASC XTR: CPT | Mod: 26,RT,, | Performed by: RADIOLOGY

## 2022-02-22 PROCEDURE — 76882 US SOFT TISSUE, UPPER EXTREMITY, RIGHT: ICD-10-PCS | Mod: 26,RT,, | Performed by: RADIOLOGY

## 2022-02-23 ENCOUNTER — PATIENT OUTREACH (OUTPATIENT)
Dept: ADMINISTRATIVE | Facility: HOSPITAL | Age: 57
End: 2022-02-23
Payer: COMMERCIAL

## 2022-02-23 NOTE — TELEPHONE ENCOUNTER
----- Message from Beatriz Gallagher sent at 2/23/2022 10:52 AM CST -----  Contact: AIDE BARKER [[id number]] @ [phone]   Patient was scheduled  with speaking with him. He want to  to call him again to speak with him about rescheduling, I tried to help but it denied me scheduling.

## 2022-02-23 NOTE — PROGRESS NOTES
HTN Report: Patient notified to obtain a home BP reading. Patient states he does not have a current reading but will check his BP and send the reading via the [employer] portal.

## 2022-02-24 ENCOUNTER — TELEPHONE (OUTPATIENT)
Dept: INTERNAL MEDICINE | Facility: CLINIC | Age: 57
End: 2022-02-24
Payer: COMMERCIAL

## 2022-02-24 NOTE — PROGRESS NOTES
Patient sent [employer] message with current home BP reading, 126/85. Remote BP entered.

## 2022-03-04 DIAGNOSIS — M79.601 RIGHT ARM PAIN: Primary | ICD-10-CM

## 2022-03-08 ENCOUNTER — PATIENT OUTREACH (OUTPATIENT)
Dept: ADMINISTRATIVE | Facility: OTHER | Age: 57
End: 2022-03-08
Payer: COMMERCIAL

## 2022-03-09 ENCOUNTER — HOSPITAL ENCOUNTER (OUTPATIENT)
Dept: RADIOLOGY | Facility: HOSPITAL | Age: 57
Discharge: HOME | End: 2022-03-09
Attending: ORTHOPAEDIC SURGERY
Payer: COMMERCIAL

## 2022-03-09 ENCOUNTER — OFFICE VISIT (OUTPATIENT)
Dept: ORTHOPEDICS | Facility: CLINIC | Age: 57
End: 2022-03-09
Payer: COMMERCIAL

## 2022-03-09 VITALS — WEIGHT: 255 LBS | HEIGHT: 74 IN | BODY MASS INDEX: 32.73 KG/M2

## 2022-03-09 DIAGNOSIS — S46.211A TRAUMATIC RUPTURE OF RIGHT DISTAL BICEPS TENDON, INITIAL ENCOUNTER: Primary | ICD-10-CM

## 2022-03-09 DIAGNOSIS — G89.29 CHRONIC LOW BACK PAIN WITHOUT SCIATICA, UNSPECIFIED BACK PAIN LATERALITY: ICD-10-CM

## 2022-03-09 DIAGNOSIS — M79.601 RIGHT ARM PAIN: ICD-10-CM

## 2022-03-09 DIAGNOSIS — M79.629 PAIN OF UPPER ARM AFTER TRAUMA: ICD-10-CM

## 2022-03-09 DIAGNOSIS — M54.50 CHRONIC LOW BACK PAIN WITHOUT SCIATICA, UNSPECIFIED BACK PAIN LATERALITY: ICD-10-CM

## 2022-03-09 DIAGNOSIS — Z01.818 PREOP TESTING: Primary | ICD-10-CM

## 2022-03-09 DIAGNOSIS — M79.601 RIGHT ARM PAIN: Primary | ICD-10-CM

## 2022-03-09 PROCEDURE — 73060 X-RAY EXAM OF HUMERUS: CPT | Mod: 26,RT,, | Performed by: RADIOLOGY

## 2022-03-09 PROCEDURE — 99203 PR OFFICE/OUTPT VISIT, NEW, LEVL III, 30-44 MIN: ICD-10-PCS | Mod: S$GLB,,, | Performed by: ORTHOPAEDIC SURGERY

## 2022-03-09 PROCEDURE — 3008F PR BODY MASS INDEX (BMI) DOCUMENTED: ICD-10-PCS | Mod: CPTII,S$GLB,, | Performed by: ORTHOPAEDIC SURGERY

## 2022-03-09 PROCEDURE — 99999 PR PBB SHADOW E&M-EST. PATIENT-LVL III: CPT | Mod: PBBFAC,,, | Performed by: ORTHOPAEDIC SURGERY

## 2022-03-09 PROCEDURE — 1160F PR REVIEW ALL MEDS BY PRESCRIBER/CLIN PHARMACIST DOCUMENTED: ICD-10-PCS | Mod: CPTII,S$GLB,, | Performed by: ORTHOPAEDIC SURGERY

## 2022-03-09 PROCEDURE — 1160F RVW MEDS BY RX/DR IN RCRD: CPT | Mod: CPTII,S$GLB,, | Performed by: ORTHOPAEDIC SURGERY

## 2022-03-09 PROCEDURE — 73060 XR HUMERUS 2 VIEW RIGHT: ICD-10-PCS | Mod: 26,RT,, | Performed by: RADIOLOGY

## 2022-03-09 PROCEDURE — 1159F PR MEDICATION LIST DOCUMENTED IN MEDICAL RECORD: ICD-10-PCS | Mod: CPTII,S$GLB,, | Performed by: ORTHOPAEDIC SURGERY

## 2022-03-09 PROCEDURE — 99203 OFFICE O/P NEW LOW 30 MIN: CPT | Mod: S$GLB,,, | Performed by: ORTHOPAEDIC SURGERY

## 2022-03-09 PROCEDURE — 73060 X-RAY EXAM OF HUMERUS: CPT | Mod: TC,RT

## 2022-03-09 PROCEDURE — 1159F MED LIST DOCD IN RCRD: CPT | Mod: CPTII,S$GLB,, | Performed by: ORTHOPAEDIC SURGERY

## 2022-03-09 PROCEDURE — 3008F BODY MASS INDEX DOCD: CPT | Mod: CPTII,S$GLB,, | Performed by: ORTHOPAEDIC SURGERY

## 2022-03-09 PROCEDURE — 99999 PR PBB SHADOW E&M-EST. PATIENT-LVL III: ICD-10-PCS | Mod: PBBFAC,,, | Performed by: ORTHOPAEDIC SURGERY

## 2022-03-09 RX ORDER — TRAMADOL HYDROCHLORIDE 50 MG/1
50 TABLET ORAL EVERY 4 HOURS PRN
Qty: 40 TABLET | Refills: 0 | Status: CANCELLED | OUTPATIENT
Start: 2022-03-09

## 2022-03-09 NOTE — PROGRESS NOTES
Subjective:     Patient ID: Dimas Darden is a 56 y.o. male.    Chief Complaint: Pain and Injury of the Right Elbow and Pain and Injury of the Right Upper Arm      HPI:  The patient is a 56-year-old male with a right distal biceps tendon rupture after a fall 2022.  He is left-hand dominant.  He wishes to maintain strength and wishes to have a right distal biceps tendon rupture repair    Past Medical History:   Diagnosis Date    Back pain     Disc     Bulging disc in back    Diverticulitis      Past Surgical History:   Procedure Laterality Date    COLONOSCOPY N/A 3/13/2020    Procedure: COLONOSCOPY;  Surgeon: Mat Guerrero MD;  Location: [facility] ENDO;  Service: General;  Laterality: N/A;    ESOPHAGOGASTRODUODENOSCOPY N/A 4/15/2021    Procedure: EGD (ESOPHAGOGASTRODUODENOSCOPY) needs rapid COVID;  Surgeon: Josie Livingston MD;  Location: [facility] ENDO;  Service: Endoscopy;  Laterality: N/A;    INJECTION OF ANESTHETIC AGENT INTO TISSUE PLANE DEFINED BY TRANSVERSUS ABDOMINIS MUSCLE N/A 2021    Procedure: BLOCK, TRANSVERSUS ABDOMINIS PLANE;  Surgeon: Mat Guerrero MD;  Location: [facility] OR;  Service: General;  Laterality: N/A;    LAPAROSCOPIC SIGMOIDECTOMY N/A 2021    Procedure: COLECTOMY, SIGMOID, LAPAROSCOPIC;  Surgeon: Mat Guerrero MD;  Location: [facility] OR;  Service: General;  Laterality: N/A;    MOBILIZATION OF SPLENIC FLEXURE N/A 2021    Procedure: MOBILIZATION, SPLENIC FLEXURE;  Surgeon: Mat Guerrero MD;  Location: [facility] OR;  Service: General;  Laterality: N/A;  Laparoscopic    UNDESCENDED TESTICLE EXPLORATION Left     Age 15     Family History   Problem Relation Age of Onset    Diverticulitis Mother      Social History     Socioeconomic History    Marital status:    Tobacco Use    Smoking status: Former Smoker     Packs/day: 1.00     Years: 25.00     Pack years: 25.00     Quit date: 4/10/2012     Years since quittin.9    Smokeless tobacco: Never Used    Substance and Sexual Activity    Alcohol use: Yes     Alcohol/week: 4.0 standard drinks     Types: 4 Glasses of wine per week     Comment: socially  No alcohol 72h prior to sx    Drug use: No     Social Determinants of Health     Financial Resource Strain: Medium Risk    Difficulty of Paying Living Expenses: Somewhat hard   Food Insecurity: No Food Insecurity    Worried About Running Out of Food in the Last Year: Never true    Ran Out of Food in the Last Year: Never true   Transportation Needs: No Transportation Needs    Lack of Transportation (Medical): No    Lack of Transportation (Non-Medical): No   Physical Activity: Insufficiently Active    Days of Exercise per Week: 3 days    Minutes of Exercise per Session: 30 min   Stress: No Stress Concern Present    Feeling of Stress : Not at all   Social Connections: Unknown    Frequency of Communication with Friends and Family: Twice a week    Frequency of Social Gatherings with Friends and Family: Once a week    Attends Club or Organization Meetings: Never    Marital Status:    Housing Stability: Low Risk     Unable to Pay for Housing in the Last Year: No    Number of Places Lived in the Last Year: 1    Unstable Housing in the Last Year: No     Medication List with Changes/Refills   Current Medications    AMLODIPINE (NORVASC) 5 MG TABLET    Take 1 tablet (5 mg total) by mouth once daily.    CIPROFLOXACIN HCL (CIPRO) 500 MG TABLET    Take 1 tablet (500 mg total) by mouth 2 (two) times daily.    METHOCARBAMOL (ROBAXIN) 500 MG TAB    TAKE 1 TABLET BY MOUTH 4 TIMES DAILY FOR 10 DAYS    PANTOPRAZOLE (PROTONIX) 40 MG TABLET    Take 1 tablet (40 mg total) by mouth once daily.    TRAMADOL (ULTRAM) 50 MG TABLET    Take 1 tablet (50 mg total) by mouth every 4 (four) hours as needed for Pain. for pain.     Review of patient's allergies indicates:  No Known Allergies  Review of Systems   Constitutional: Negative for malaise/fatigue.   HENT: Negative for  hearing loss.    Eyes: Negative for double vision and visual disturbance.   Cardiovascular: Negative for chest pain.   Respiratory: Negative for shortness of breath.    Endocrine: Negative for cold intolerance.   Hematologic/Lymphatic: Does not bruise/bleed easily.   Skin: Negative for poor wound healing and suspicious lesions.   Musculoskeletal: Negative for gout, joint pain and joint swelling.   Gastrointestinal: Positive for abdominal pain and heartburn. Negative for nausea and vomiting.   Genitourinary: Negative for dysuria.   Neurological: Negative for numbness, paresthesias and sensory change.   Psychiatric/Behavioral: Negative for depression, memory loss and substance abuse. The patient is not nervous/anxious.    Allergic/Immunologic: Negative for persistent infections.       Objective:   Body mass index is 32.74 kg/m².  There were no vitals filed for this visit.             General    Constitutional: He is oriented to person, place, and time. He appears well-developed and well-nourished. No distress.   HENT:   Head: Normocephalic.   Mouth/Throat: Oropharynx is clear and moist.   Eyes: EOM are normal.   Cardiovascular: Normal rate.    Pulmonary/Chest: Effort normal.   Abdominal: Soft.   Neurological: He is alert and oriented to person, place, and time. No cranial nerve deficit.   Psychiatric: He has a normal mood and affect.         Right Elbow Exam     Inspection   Scars: absent  Effusion: absent  Deformity: present    Comments:  The patient has a distal biceps tendon rupture with retraction of the biceps muscle proximally.  There are no motor or sensory deficits.  Has full range of motion right elbow            Relevant imaging results reviewed and interpreted by me, discussed with the patient and / or family today radiographs right humerus and elbow were normal.  Assessment:     Encounter Diagnoses   Name Primary?    Pain of upper arm after trauma     Right arm pain     Traumatic rupture of right distal  biceps tendon, initial encounter Yes        Plan:     The patient wishes to have a right distal biceps tendon rupture repair.  He was counseled regarding the surgery.  Risk complications and alternatives were discussed including the risk of infection, anesthetic risk, injury to nerves and vessels, loss of motion, and possible need for additional surgeries were discussed.  He seems to understand and agree that surgery.  All questions were answered.  He was told he may elect not to have surgery but would lose about 20% of supination strength and 20% of flexion strength.                Disclaimer: This note was prepared using a voice recognition system and is likely to have sound alike errors within the text.

## 2022-03-09 NOTE — H&P (VIEW-ONLY)
Subjective:     Patient ID: Dimas Darden is a 56 y.o. male.    Chief Complaint: Pain and Injury of the Right Elbow and Pain and Injury of the Right Upper Arm      HPI:  The patient is a 56-year-old male with a right distal biceps tendon rupture after a fall 2022.  He is left-hand dominant.  He wishes to maintain strength and wishes to have a right distal biceps tendon rupture repair    Past Medical History:   Diagnosis Date    Back pain     Disc     Bulging disc in back    Diverticulitis      Past Surgical History:   Procedure Laterality Date    COLONOSCOPY N/A 3/13/2020    Procedure: COLONOSCOPY;  Surgeon: Mat Guerrero MD;  Location: [facility] ENDO;  Service: General;  Laterality: N/A;    ESOPHAGOGASTRODUODENOSCOPY N/A 4/15/2021    Procedure: EGD (ESOPHAGOGASTRODUODENOSCOPY) needs rapid COVID;  Surgeon: Josie Livingston MD;  Location: [facility] ENDO;  Service: Endoscopy;  Laterality: N/A;    INJECTION OF ANESTHETIC AGENT INTO TISSUE PLANE DEFINED BY TRANSVERSUS ABDOMINIS MUSCLE N/A 2021    Procedure: BLOCK, TRANSVERSUS ABDOMINIS PLANE;  Surgeon: Mat Guerrero MD;  Location: [facility] OR;  Service: General;  Laterality: N/A;    LAPAROSCOPIC SIGMOIDECTOMY N/A 2021    Procedure: COLECTOMY, SIGMOID, LAPAROSCOPIC;  Surgeon: Mat Guerrero MD;  Location: [facility] OR;  Service: General;  Laterality: N/A;    MOBILIZATION OF SPLENIC FLEXURE N/A 2021    Procedure: MOBILIZATION, SPLENIC FLEXURE;  Surgeon: Mat Guerrero MD;  Location: [facility] OR;  Service: General;  Laterality: N/A;  Laparoscopic    UNDESCENDED TESTICLE EXPLORATION Left     Age 15     Family History   Problem Relation Age of Onset    Diverticulitis Mother      Social History     Socioeconomic History    Marital status:    Tobacco Use    Smoking status: Former Smoker     Packs/day: 1.00     Years: 25.00     Pack years: 25.00     Quit date: 4/10/2012     Years since quittin.9    Smokeless tobacco: Never Used    Substance and Sexual Activity    Alcohol use: Yes     Alcohol/week: 4.0 standard drinks     Types: 4 Glasses of wine per week     Comment: socially  No alcohol 72h prior to sx    Drug use: No     Social Determinants of Health     Financial Resource Strain: Medium Risk    Difficulty of Paying Living Expenses: Somewhat hard   Food Insecurity: No Food Insecurity    Worried About Running Out of Food in the Last Year: Never true    Ran Out of Food in the Last Year: Never true   Transportation Needs: No Transportation Needs    Lack of Transportation (Medical): No    Lack of Transportation (Non-Medical): No   Physical Activity: Insufficiently Active    Days of Exercise per Week: 3 days    Minutes of Exercise per Session: 30 min   Stress: No Stress Concern Present    Feeling of Stress : Not at all   Social Connections: Unknown    Frequency of Communication with Friends and Family: Twice a week    Frequency of Social Gatherings with Friends and Family: Once a week    Attends Club or Organization Meetings: Never    Marital Status:    Housing Stability: Low Risk     Unable to Pay for Housing in the Last Year: No    Number of Places Lived in the Last Year: 1    Unstable Housing in the Last Year: No     Medication List with Changes/Refills   Current Medications    AMLODIPINE (NORVASC) 5 MG TABLET    Take 1 tablet (5 mg total) by mouth once daily.    CIPROFLOXACIN HCL (CIPRO) 500 MG TABLET    Take 1 tablet (500 mg total) by mouth 2 (two) times daily.    METHOCARBAMOL (ROBAXIN) 500 MG TAB    TAKE 1 TABLET BY MOUTH 4 TIMES DAILY FOR 10 DAYS    PANTOPRAZOLE (PROTONIX) 40 MG TABLET    Take 1 tablet (40 mg total) by mouth once daily.    TRAMADOL (ULTRAM) 50 MG TABLET    Take 1 tablet (50 mg total) by mouth every 4 (four) hours as needed for Pain. for pain.     Review of patient's allergies indicates:  No Known Allergies  Review of Systems   Constitutional: Negative for malaise/fatigue.   HENT: Negative for  hearing loss.    Eyes: Negative for double vision and visual disturbance.   Cardiovascular: Negative for chest pain.   Respiratory: Negative for shortness of breath.    Endocrine: Negative for cold intolerance.   Hematologic/Lymphatic: Does not bruise/bleed easily.   Skin: Negative for poor wound healing and suspicious lesions.   Musculoskeletal: Negative for gout, joint pain and joint swelling.   Gastrointestinal: Positive for abdominal pain and heartburn. Negative for nausea and vomiting.   Genitourinary: Negative for dysuria.   Neurological: Negative for numbness, paresthesias and sensory change.   Psychiatric/Behavioral: Negative for depression, memory loss and substance abuse. The patient is not nervous/anxious.    Allergic/Immunologic: Negative for persistent infections.       Objective:   Body mass index is 32.74 kg/m².  There were no vitals filed for this visit.             General    Constitutional: He is oriented to person, place, and time. He appears well-developed and well-nourished. No distress.   HENT:   Head: Normocephalic.   Mouth/Throat: Oropharynx is clear and moist.   Eyes: EOM are normal.   Cardiovascular: Normal rate.    Pulmonary/Chest: Effort normal.   Abdominal: Soft.   Neurological: He is alert and oriented to person, place, and time. No cranial nerve deficit.   Psychiatric: He has a normal mood and affect.         Right Elbow Exam     Inspection   Scars: absent  Effusion: absent  Deformity: present    Comments:  The patient has a distal biceps tendon rupture with retraction of the biceps muscle proximally.  There are no motor or sensory deficits.  Has full range of motion right elbow            Relevant imaging results reviewed and interpreted by me, discussed with the patient and / or family today radiographs right humerus and elbow were normal.  Assessment:     Encounter Diagnoses   Name Primary?    Pain of upper arm after trauma     Right arm pain     Traumatic rupture of right distal  biceps tendon, initial encounter Yes        Plan:     The patient wishes to have a right distal biceps tendon rupture repair.  He was counseled regarding the surgery.  Risk complications and alternatives were discussed including the risk of infection, anesthetic risk, injury to nerves and vessels, loss of motion, and possible need for additional surgeries were discussed.  He seems to understand and agree that surgery.  All questions were answered.  He was told he may elect not to have surgery but would lose about 20% of supination strength and 20% of flexion strength.                Disclaimer: This note was prepared using a voice recognition system and is likely to have sound alike errors within the text.

## 2022-03-09 NOTE — TELEPHONE ENCOUNTER
No new care gaps identified.  Powered by [employer]. Reference number: [id number].   3/09/2022 5:48:22 PM CST

## 2022-03-09 NOTE — PROGRESS NOTES
Health Maintenance Due   Topic Date Due    Hepatitis C Screening  Never done    HIV Screening  Never done    TETANUS VACCINE  Never done    LDCT Lung Screen  Never done    Shingles Vaccine (1 of 2) Never done    Influenza Vaccine (1) 09/01/2021    COVID-19 Vaccine (3 - Booster for Pfizer series) 10/18/2021     Updates were requested from care everywhere.  Chart was reviewed for overdue Proactive [facility] Encounters ([facility]) topics (CRS, Breast Cancer Screening, Eye exam)  Health Maintenance has been updated.  LINKS immunization registry triggered.  Immunizations were reconciled.

## 2022-03-11 RX ORDER — TRAMADOL HYDROCHLORIDE 50 MG/1
50 TABLET ORAL EVERY 6 HOURS
Qty: 28 TABLET | Refills: 0 | Status: SHIPPED | OUTPATIENT
Start: 2022-03-11 | End: 2022-03-18

## 2022-03-11 NOTE — TELEPHONE ENCOUNTER
-     The [place]  has been reviewed. There are no irregularities noted.   -     Tramadol refilled.

## 2022-03-18 ENCOUNTER — TELEPHONE (OUTPATIENT)
Dept: PREADMISSION TESTING | Facility: HOSPITAL | Age: 57
End: 2022-03-18

## 2022-03-18 ENCOUNTER — PATIENT MESSAGE (OUTPATIENT)
Dept: SURGERY | Facility: HOSPITAL | Age: 57
End: 2022-03-18
Payer: COMMERCIAL

## 2022-03-21 ENCOUNTER — HOSPITAL ENCOUNTER (OUTPATIENT)
Dept: CARDIOLOGY | Facility: HOSPITAL | Age: 57
Discharge: HOME | End: 2022-03-21
Attending: ORTHOPAEDIC SURGERY
Payer: COMMERCIAL

## 2022-03-21 DIAGNOSIS — Z01.818 PREOP TESTING: ICD-10-CM

## 2022-03-21 PROBLEM — S46.219A RUPTURE OF DISTAL BICEPS TENDON: Status: ACTIVE | Noted: 2022-03-21

## 2022-03-21 PROCEDURE — 93010 ELECTROCARDIOGRAM REPORT: CPT | Mod: ,,, | Performed by: INTERNAL MEDICINE

## 2022-03-21 PROCEDURE — 93010 EKG 12-LEAD: ICD-10-PCS | Mod: ,,, | Performed by: INTERNAL MEDICINE

## 2022-03-21 PROCEDURE — 93005 ELECTROCARDIOGRAM TRACING: CPT

## 2022-03-22 ENCOUNTER — TELEPHONE (OUTPATIENT)
Dept: ORTHOPEDICS | Facility: CLINIC | Age: 57
End: 2022-03-22
Payer: COMMERCIAL

## 2022-03-22 ENCOUNTER — HOSPITAL ENCOUNTER (OUTPATIENT)
Dept: PREADMISSION TESTING | Facility: HOSPITAL | Age: 57
Discharge: HOME | End: 2022-03-22
Attending: FAMILY MEDICINE
Payer: COMMERCIAL

## 2022-03-22 VITALS
TEMPERATURE: 99 F | OXYGEN SATURATION: 98 % | HEART RATE: 82 BPM | BODY MASS INDEX: 32.73 KG/M2 | HEIGHT: 74 IN | DIASTOLIC BLOOD PRESSURE: 88 MMHG | WEIGHT: 255 LBS | SYSTOLIC BLOOD PRESSURE: 148 MMHG | RESPIRATION RATE: 16 BRPM

## 2022-03-22 DIAGNOSIS — I10 HYPERTENSION, UNSPECIFIED TYPE: ICD-10-CM

## 2022-03-22 DIAGNOSIS — R94.31 EKG ABNORMALITIES: ICD-10-CM

## 2022-03-22 NOTE — DISCHARGE INSTRUCTIONS
To confirm, Your doctor has instructed you that surgery is scheduled for 3/28/2022.       Please report to [facility].      Pre admit office will call afternoon prior to surgery with final arrival time.    INSTRUCTIONS IMPORTANT!!!     Do not eat, drink, or smoke after 12 midnight-including water. OK to brush teeth, no gum, candy or mints!    ¨ Take only these medicines with a small swallow of water-morning of surgery.    Amlodipine    Pre operative instructions:  Please review the Pre-Operative Instruction booklet that you were given.        Bathing Instructions--See page 6 in the Pre-operative booklet.      Prevention of surgical site infections:     -Keep incisions clean and dry.   -Do not soak/submerge incisions in water until completely healed.   -Do not apply lotions, powders, creams, or deodorants to site.   -Always make sure hands are cleaned with antibacterial soap/ alcohol-based  prior to touching the surgical site.  (This includes doctors, nurses, staff, and yourself.)    Signs and symptoms:   -Redness and pain around the area where you had surgery   -Drainage of cloudy fluid from your surgical wound   -Fever over 100.4       I have read or had read and explained to me, and understand the above information.  Additional comments or instructions:  Received a copy of Pre-operative instructions booklet, FAQ surgical site infection sheet, and packets of hibiclens (if indicated).

## 2022-03-22 NOTE — H&P
Preoperative History and Physical                                                             Hospital Medicine      Chief Complaint: Preoperative evaluation     History of Present Illness:      Dimas Darden is a 56 y.o. male with a PMHx of Back pain, GERD, HTN, and Diverticulitis who presents to the office today for a preoperative consultation at the request of Dr. Blood who plans on performing a Right biceps tendon repair on March 28.     Functional Status:      The patient is able to climb a flight of stairs. The patient is able to ambulate without difficulty. The patient's functional status is affected by the surgical problem. The patient's functional status is not affected by shortness of breath, chest pain, dyspnea on exertion and fatigue.    MET score greater than 4    Past Medical History:      Past Medical History:   Diagnosis Date    Back pain     Digestive disorder     Disc     Bulging disc in back    Diverticulitis     Hypertension         Past Surgical History:      Past Surgical History:   Procedure Laterality Date    COLONOSCOPY N/A 3/13/2020    Procedure: COLONOSCOPY;  Surgeon: Mat Guerrero MD;  Location: [facility];  Service: General;  Laterality: N/A;    ESOPHAGOGASTRODUODENOSCOPY N/A 4/15/2021    Procedure: EGD (ESOPHAGOGASTRODUODENOSCOPY) needs rapid COVID;  Surgeon: Josie Livingston MD;  Location: [facility];  Service: Endoscopy;  Laterality: N/A;    INJECTION OF ANESTHETIC AGENT INTO TISSUE PLANE DEFINED BY TRANSVERSUS ABDOMINIS MUSCLE N/A 6/8/2021    Procedure: BLOCK, TRANSVERSUS ABDOMINIS PLANE;  Surgeon: Mat Guerrero MD;  [facility];  Service: General;  Laterality: N/A;    LAPAROSCOPIC SIGMOIDECTOMY N/A 6/8/2021    Procedure: COLECTOMY, SIGMOID, LAPAROSCOPIC;  Surgeon: Mat Guerrero MD;  Location: [facility] OR;  Service: General;  Laterality: N/A;    MOBILIZATION OF SPLENIC FLEXURE N/A 6/8/2021    Procedure:  MOBILIZATION, SPLENIC FLEXURE;  Surgeon: Mat Guerrero MD;  Location: [facility];  Service: General;  Laterality: N/A;  Laparoscopic    UNDESCENDED TESTICLE EXPLORATION Left     Age 15        Social History:      Social History     Socioeconomic History    Marital status:    Tobacco Use    Smoking status: Former Smoker     Packs/day: 1.00     Years: 25.00     Pack years: 25.00     Quit date: 4/10/2012     Years since quittin.9    Smokeless tobacco: Never Used   Substance and Sexual Activity    Alcohol use: Yes     Alcohol/week: 4.0 standard drinks     Types: 4 Glasses of wine per week     Comment: Socially    Drug use: No     Social Determinants of Health     Financial Resource Strain: Medium Risk    Difficulty of Paying Living Expenses: Somewhat hard   Food Insecurity: No Food Insecurity    Worried About Running Out of Food in the Last Year: Never true    Ran Out of Food in the Last Year: Never true   Transportation Needs: No Transportation Needs    Lack of Transportation (Medical): No    Lack of Transportation (Non-Medical): No   Physical Activity: Insufficiently Active    Days of Exercise per Week: 3 days    Minutes of Exercise per Session: 30 min   Stress: No Stress Concern Present    Feeling of Stress : Not at all   Social Connections: Unknown    Frequency of Communication with Friends and Family: Twice a week    Frequency of Social Gatherings with Friends and Family: Once a week    Attends Club or Organization Meetings: Never    Marital Status:    Housing Stability: Low Risk     Unable to Pay for Housing in the Last Year: No    Number of Places Lived in the Last Year: 1    Unstable Housing in the Last Year: No        Family History:      Family History   Problem Relation Age of Onset    Diverticulitis Mother     No Known Problems Father     No Known Problems Sister     No Known Problems Brother     No Known Problems Brother        Allergies:      Review of  patient's allergies indicates:  No Known Allergies    Medications:      Current Outpatient Medications   Medication Sig    amLODIPine (NORVASC) 5 MG tablet Take 1 tablet (5 mg total) by mouth once daily.    traMADoL (ULTRAM) 50 mg tablet Take 1 tablet (50 mg total) by mouth every 4 (four) hours as needed for Pain. for pain.    ciprofloxacin HCl (CIPRO) 500 MG tablet Take 1 tablet (500 mg total) by mouth 2 (two) times daily.    methocarbamoL (ROBAXIN) 500 MG Tab TAKE 1 TABLET BY MOUTH 4 TIMES DAILY FOR 10 DAYS    pantoprazole (PROTONIX) 40 MG tablet Take 1 tablet (40 mg total) by mouth once daily. (Patient not taking: Reported on 3/22/2022)     Current Facility-Administered Medications   Medication    lidocaine (PF) 10 mg/ml (1%) injection 10 mg       Vitals:      Vitals:    03/22/22 0808   BP: (!) 148/88   Pulse: 82   Resp: 16   Temp: 98.8 °F (37.1 °C)       Review of Systems:        Constitutional: Negative for fever, chills, weight loss, malaise/fatigue and diaphoresis.   HENT: Negative for hearing loss, ear pain, nosebleeds, congestion, sore throat, neck pain, tinnitus and ear discharge.    Eyes: Negative for blurred vision, double vision, photophobia, pain, discharge and redness.   Respiratory: Negative for cough, hemoptysis, sputum production, shortness of breath, wheezing and stridor.    Cardiovascular: Negative for chest pain, palpitations, orthopnea, claudication, leg swelling and PND.   Gastrointestinal: Negative for heartburn, nausea, vomiting, abdominal pain, diarrhea, constipation, blood in stool and melena.   Genitourinary: Negative for dysuria, urgency, frequency, hematuria and flank pain.   Musculoskeletal: Negative for myalgias, back pain, and falls. Positive for RUE pain, described as a shooting/stabbing pain, rates 10/10 at worst, aggravated by lifting/pulling.  Skin: Negative for itching and rash.   Neurological: Negative for dizziness, tingling, tremors, sensory change, speech change, focal  weakness, seizures, loss of consciousness, weakness and headaches.   Endo/Heme/Allergies: Negative for environmental allergies and polydipsia. Does not bruise/bleed easily.   Psychiatric/Behavioral: Negative for depression, suicidal ideas, hallucinations, memory loss and substance abuse. The patient is not nervous/anxious and does not have insomnia.    All 14 systems reviewed and negative except as noted above.    Physical Exam:      Constitutional: Appears well-developed, well-nourished and in no acute distress.  Patient is oriented to person, place, and time.   Head: Normocephalic and atraumatic. Mucous membranes moist.  Neck: Neck supple no mass.   Cardiovascular: Normal rate and regular rhythm.  S1 S2 appreciated by ascultation.  Pulmonary/Chest: Effort normal and clear to auscultation bilaterally. No respiratory distress.   Abdomen: Soft. Non-tender and non-distended. Bowel sounds are normal.   Neurological: Patient is alert and oriented to person, place and time. Moves all extremities.  Skin: Warm and dry. No lesions.  Extremities: No clubbing, cyanosis or edema.    Laboratory data:      Reviewed and noted in plan where applicable. Please see chart for full laboratory data.    No results for input(s): CPK, CPKMB, TROPONINI, MB in the last 24 hours. No results for input(s): POCTGLUCOSE in the last 24 hours.     Lab Results   Component Value Date    INR 1.0 12/27/2016       Lab Results   Component Value Date    WBC 5.84 03/21/2022    HGB 15.1 03/21/2022    HCT 46.3 03/21/2022    MCV 90 03/21/2022     03/21/2022       Recent Labs   Lab 03/21/22  1001   *      K 4.5      CO2 25   BUN 15   CREATININE 0.9   CALCIUM 9.8       Predictors of intubation difficulty:       Morbid obesity? no   Anatomically abnormal facies? no   Prominent incisors? no   Receding mandible? no   Short, thick neck? yes    Neck range of motion: normal   Dentition: Missing teeth to upper.   Based on the Modified  Mallampati patient is a Class 1.    Cardiographics:      ECG: Normal sinus rhythm  Left anterior fascicular block  No significant change was found  When compared with ECG of 24-MAY-2021 13:21,  Incomplete right bundle branch block is no longer Present.    Stress Echocardiogram in 2020 showed:    · Normal left ventricular systolic function. The estimated ejection fraction is 55%.  · Grade I (mild) left ventricular diastolic dysfunction consistent with impaired relaxation.  · Normal right ventricular systolic function.  · The patient's exercise capacity was normal.  · The test was stopped because the patient experienced fatigue.  · There were no arrhythmias during stress.  · The stress echo portion of this study is negative for myocardial ischemia.  · The ECG portion of this study is negative for myocardial ischemia.      Imaging:      Chest x-ray: Not indicated.    Assessment and Plan:      Rupture of distal biceps tendon right  - Patient presents today at the request of Dr. Blood who plans on performing a Right biceps tendon repair on 3/28.    Known risk factors for perioperative complications: None    Difficulty with intubation is not anticipated.    Cardiac Risk Estimation: Based on the Revised Cardiac Risk Index, patient is a Class 1 risk with a 3.9% risk of a major cardiac event in a low risk procedure.        1.) Preoperative workup as follows: ECG, hemoglobin, hematocrit, electrolytes, creatinine, glucose, liver function studies.  2.) Change in medication regimen before surgery: discontinue ASA 6 days before surgery, discontinue NSAIDs 5 days before surgery.  3.) Prophylaxis for cardiac events with perioperative beta-blockers: not indicated.  4.) Invasive hemodynamic monitoring perioperatively: not indicated.  5.) Deep vein thrombosis prophylaxis postoperatively: intermittent pneumatic compression boots and regimen to be chosen by surgical team.  6.) Surveillance for postoperative MI with ECG immediately  postoperatively and on postoperati ve days 1 and 2 AND troponin levels 24 hours postoperatively and on day 4 or hospital discharge (whichever comes first): not indicated.  7.) Current medications which may produce withdrawal symptoms if withheld perioperatively: None.  8.) Other measures: None.    HTN (hypertension)  - BP under fair control.  - Continue home Norvasc.    GERD (gastroesophageal reflux disease)  - Continue home PPI.    EKG abnormalities  - Currently asymptomatic with no c/o CP and/or equivalent.   - Evaluated by Dr. Santacruz in March of 2020; Stress ECHO at that time showed;    · Normal left ventricular systolic function. The estimated ejection fraction is 55%.  · Grade I (mild) left ventricular diastolic dysfunction consistent with impaired relaxation.  · Normal right ventricular systolic function.  · The patient's exercise capacity was normal.  · The test was stopped because the patient experienced fatigue.  · There were no arrhythmias during stress.  · The stress echo portion of this study is negative for myocardial ischemia.  · The ECG portion of this study is negative for myocardial ischemia.    - Recent EKG showed NSR with LAFB with no significant change.

## 2022-03-22 NOTE — ASSESSMENT & PLAN NOTE
- Currently asymptomatic with no c/o CP and/or equivalent.   - Evaluated by Dr. Santacruz in March of 2020; Stress ECHO at that time showed;    · Normal left ventricular systolic function. The estimated ejection fraction is 55%.  · Grade I (mild) left ventricular diastolic dysfunction consistent with impaired relaxation.  · Normal right ventricular systolic function.  · The patient's exercise capacity was normal.  · The test was stopped because the patient experienced fatigue.  · There were no arrhythmias during stress.  · The stress echo portion of this study is negative for myocardial ischemia.  · The ECG portion of this study is negative for myocardial ischemia.    - Recent EKG showed NSR with LAFB with no significant change.

## 2022-03-22 NOTE — ASSESSMENT & PLAN NOTE
- Patient presents today at the request of Dr. Blood who plans on performing a Right biceps tendon repair on 3/28.    Known risk factors for perioperative complications: None    Difficulty with intubation is not anticipated.    Cardiac Risk Estimation: Based on the Revised Cardiac Risk Index, patient is a Class 1 risk with a 3.9% risk of a major cardiac event in a low risk procedure.        1.) Preoperative workup as follows: ECG, hemoglobin, hematocrit, electrolytes, creatinine, glucose, liver function studies.  2.) Change in medication regimen before surgery: discontinue ASA 6 days before surgery, discontinue NSAIDs 5 days before surgery.  3.) Prophylaxis for cardiac events with perioperative beta-blockers: not indicated.  4.) Invasive hemodynamic monitoring perioperatively: not indicated.  5.) Deep vein thrombosis prophylaxis postoperatively: intermittent pneumatic compression boots and regimen to be chosen by surgical team.  6.) Surveillance for postoperative MI with ECG immediately postoperatively and on postoperati ve days 1 and 2 AND troponin levels 24 hours postoperatively and on day 4 or hospital discharge (whichever comes first): not indicated.  7.) Current medications which may produce withdrawal symptoms if withheld perioperatively: None.  8.) Other measures: None.

## 2022-03-22 NOTE — TELEPHONE ENCOUNTER
Spoke to Taty regarding this patient. Pt did not mention it was WC on the day of his OV. Pt did see Lisset Ledbetter DO on 2/21/2022 and in the note it states the incident happened at his home. Taty will keep me updated, as of today surgery is not approved.        ----- Message from Venessa Soto NP sent at 3/22/2022  8:05 AM CDT -----  Regarding: Workman's Comp  Good morning :)    I am seeing this patient now for his PAT appointment.  He mentioned his surgery with Dr. Blood on 3/28 should be filed under Workman's comp, but [facility] has asked him to pay $2000 out of pocket.  He reports that he has spoken with Taty Miles Worker's Comp Coordinator Business services at [facility], but he just received the notice to pay $2000, so he has reached out to her to clarify.  Taty can be reached at [phone].  I am passing along the message so that everyone is aware of the Workman's Comp.    Electronically signed by Venessa Soto DNP, ACNP on 3/22/2022 at 8:09 AM.

## 2022-03-25 ENCOUNTER — TELEPHONE (OUTPATIENT)
Dept: ORTHOPEDICS | Facility: CLINIC | Age: 57
End: 2022-03-25
Payer: COMMERCIAL

## 2022-03-25 ENCOUNTER — TELEPHONE (OUTPATIENT)
Dept: PREADMISSION TESTING | Facility: HOSPITAL | Age: 57
End: 2022-03-25
Payer: COMMERCIAL

## 2022-03-25 ENCOUNTER — ANESTHESIA EVENT (OUTPATIENT)
Dept: SURGERY | Facility: HOSPITAL | Age: 57
End: 2022-03-25
Payer: COMMERCIAL

## 2022-03-25 NOTE — ANESTHESIA PREPROCEDURE EVALUATION
03/25/2022  Dimas Darden is a 56 y.o., male.  Past Medical History:   Diagnosis Date    Back pain     Digestive disorder     Disc     Bulging disc in back    Diverticulitis     Hypertension        Past Surgical History:   Procedure Laterality Date    COLONOSCOPY N/A 3/13/2020    Procedure: COLONOSCOPY;  Surgeon: Mat Guerrero MD;  Location: [facility] ENDO;  Service: General;  Laterality: N/A;    ESOPHAGOGASTRODUODENOSCOPY N/A 4/15/2021    Procedure: EGD (ESOPHAGOGASTRODUODENOSCOPY) needs rapid COVID;  Surgeon: Josie Livingston MD;  Location: [facility] ENDO;  Service: Endoscopy;  Laterality: N/A;    INJECTION OF ANESTHETIC AGENT INTO TISSUE PLANE DEFINED BY TRANSVERSUS ABDOMINIS MUSCLE N/A 6/8/2021    Procedure: BLOCK, TRANSVERSUS ABDOMINIS PLANE;  Surgeon: Mat Guerrero MD;  Location: [facility] OR;  Service: General;  Laterality: N/A;    LAPAROSCOPIC SIGMOIDECTOMY N/A 6/8/2021    Procedure: COLECTOMY, SIGMOID, LAPAROSCOPIC;  Surgeon: Mat Guerrero MD;  Location: [facility] OR;  Service: General;  Laterality: N/A;    MOBILIZATION OF SPLENIC FLEXURE N/A 6/8/2021    Procedure: MOBILIZATION, SPLENIC FLEXURE;  Surgeon: Mat Guerrero MD;  Location: [facility] OR;  Service: General;  Laterality: N/A;  Laparoscopic    UNDESCENDED TESTICLE EXPLORATION Left     Age 15         Pre-op Assessment    I have reviewed the Patient Summary Reports.     I have reviewed the Nursing Notes. I have reviewed the NPO Status.   I have reviewed the Medications.     Review of Systems  Anesthesia Hx:  No problems with previous Anesthesia  Denies Family Hx of Anesthesia complications.   Denies Personal Hx of Anesthesia complications.   Social:  Smoker, Social Alcohol Use    Hematology/Oncology:  Hematology Normal        Cardiovascular:   Hypertension Recent stress echo NEG for ischemia, NL EF at 55%.   Pulmonary:  Pulmonary  Normal    Renal/:  Renal/ Normal     Hepatic/GI:   GERD    Neurological:  Neurology Normal    Endocrine:  Obesity / BMI > 30  Psych:  Psychiatric Normal           Physical Exam  General: Alert and Oriented    Airway:  Mallampati: II   Mouth Opening: Normal  TM Distance: Normal  Tongue: Normal  Neck ROM: Normal ROM    Dental:  Intact    Chest/Lungs:  Clear to auscultation, Normal Respiratory Rate    Heart:  Rate: Normal  Rhythm: Regular Rhythm        Anesthesia Plan  Type of Anesthesia, risks & benefits discussed:    Anesthesia Type: Gen Supraglottic Airway  Intra-op Monitoring Plan: Standard ASA Monitors  Post Op Pain Control Plan: multimodal analgesia and IV/PO Opioids PRN  Induction:  IV  Informed Consent: Informed consent signed with the Patient and all parties understand the risks and agree with anesthesia plan.  All questions answered.   ASA Score: 2  Day of Surgery Review of History & Physical: H&P Update referred to the surgeon/provider.    Ready For Surgery From Anesthesia Perspective.     .

## 2022-03-25 NOTE — TELEPHONE ENCOUNTER
Called and spoke with the patient about the following:    Your Surgery arrival time is at 6 AM on 3/28/2022 at [facility] location.    The address is [address] [place]  [address].     Only one adult (over 18) is to accompany you to surgery, unless it is a Pediatric patient, then 2 adults are encouraged to accompany them to the surgery center.    Your ride MUST STAY the entire time until you are discharged.      Please come in the main lobby (located on the 1st floor).     Be prepared to show your photo ID and insurance card.     Masks should be worn by ALL persons entering the building.     Nothing to eat or drink after after midnight, unless you were instructed to take specific medications discussed with the Pre-admit Nurse.     Please call [phone] with any questions or concerns.     Thanks.

## 2022-03-28 ENCOUNTER — ANESTHESIA (OUTPATIENT)
Dept: SURGERY | Facility: HOSPITAL | Age: 57
End: 2022-03-28
Payer: COMMERCIAL

## 2022-03-28 ENCOUNTER — HOSPITAL ENCOUNTER (OUTPATIENT)
Dept: RADIOLOGY | Facility: HOSPITAL | Age: 57
Discharge: HOME | End: 2022-03-28
Attending: ORTHOPAEDIC SURGERY | Admitting: ORTHOPAEDIC SURGERY
Payer: COMMERCIAL

## 2022-03-28 ENCOUNTER — HOSPITAL ENCOUNTER (OUTPATIENT)
Facility: HOSPITAL | Age: 57
Discharge: HOME | End: 2022-03-28
Attending: ORTHOPAEDIC SURGERY | Admitting: ORTHOPAEDIC SURGERY
Payer: COMMERCIAL

## 2022-03-28 VITALS
BODY MASS INDEX: 30.94 KG/M2 | WEIGHT: 241.06 LBS | OXYGEN SATURATION: 96 % | RESPIRATION RATE: 18 BRPM | TEMPERATURE: 98 F | HEIGHT: 74 IN | DIASTOLIC BLOOD PRESSURE: 83 MMHG | HEART RATE: 81 BPM | SYSTOLIC BLOOD PRESSURE: 156 MMHG

## 2022-03-28 DIAGNOSIS — S46.101D INJURY OF TENDON OF LONG HEAD OF RIGHT BICEPS, SUBSEQUENT ENCOUNTER: ICD-10-CM

## 2022-03-28 DIAGNOSIS — S46.211D RUPTURE OF RIGHT DISTAL BICEPS TENDON, SUBSEQUENT ENCOUNTER: Primary | ICD-10-CM

## 2022-03-28 PROCEDURE — C1713 ANCHOR/SCREW BN/BN,TIS/BN: HCPCS | Performed by: ORTHOPAEDIC SURGERY

## 2022-03-28 PROCEDURE — 25000003 PHARM REV CODE 250: Performed by: ORTHOPAEDIC SURGERY

## 2022-03-28 PROCEDURE — 63600175 PHARM REV CODE 636 W HCPCS: Performed by: PHYSICIAN ASSISTANT

## 2022-03-28 PROCEDURE — 63600175 PHARM REV CODE 636 W HCPCS: Performed by: ANESTHESIOLOGY

## 2022-03-28 PROCEDURE — 37000008 HC ANESTHESIA 1ST 15 MINUTES: Performed by: ORTHOPAEDIC SURGERY

## 2022-03-28 PROCEDURE — 71000016 HC POSTOP RECOV ADDL HR: Performed by: ORTHOPAEDIC SURGERY

## 2022-03-28 PROCEDURE — 71000039 HC RECOVERY, EACH ADD'L HOUR: Performed by: ORTHOPAEDIC SURGERY

## 2022-03-28 PROCEDURE — D9220A PRA ANESTHESIA: Mod: ANES,,, | Performed by: ANESTHESIOLOGY

## 2022-03-28 PROCEDURE — 25000003 PHARM REV CODE 250: Performed by: ANESTHESIOLOGY

## 2022-03-28 PROCEDURE — 24342 REPAIR OF RUPTURED TENDON: CPT | Mod: RT,,, | Performed by: ORTHOPAEDIC SURGERY

## 2022-03-28 PROCEDURE — 71000033 HC RECOVERY, INTIAL HOUR: Performed by: ORTHOPAEDIC SURGERY

## 2022-03-28 PROCEDURE — D9220A PRA ANESTHESIA: Mod: CRNA,,, | Performed by: NURSE ANESTHETIST, CERTIFIED REGISTERED

## 2022-03-28 PROCEDURE — 27201423 OPTIME MED/SURG SUP & DEVICES STERILE SUPPLY: Performed by: ORTHOPAEDIC SURGERY

## 2022-03-28 PROCEDURE — 27200651 HC AIRWAY, LMA: Performed by: ANESTHESIOLOGY

## 2022-03-28 PROCEDURE — 71000015 HC POSTOP RECOV 1ST HR: Performed by: ORTHOPAEDIC SURGERY

## 2022-03-28 PROCEDURE — D9220A PRA ANESTHESIA: ICD-10-PCS | Mod: ANES,,, | Performed by: ANESTHESIOLOGY

## 2022-03-28 PROCEDURE — 37000009 HC ANESTHESIA EA ADD 15 MINS: Performed by: ORTHOPAEDIC SURGERY

## 2022-03-28 PROCEDURE — 73070 X-RAY EXAM OF ELBOW: CPT | Mod: TC,RT

## 2022-03-28 PROCEDURE — 63600175 PHARM REV CODE 636 W HCPCS: Performed by: NURSE ANESTHETIST, CERTIFIED REGISTERED

## 2022-03-28 PROCEDURE — D9220A PRA ANESTHESIA: ICD-10-PCS | Mod: CRNA,,, | Performed by: NURSE ANESTHETIST, CERTIFIED REGISTERED

## 2022-03-28 PROCEDURE — 36000709 HC OR TIME LEV III EA ADD 15 MIN: Performed by: ORTHOPAEDIC SURGERY

## 2022-03-28 PROCEDURE — 36000708 HC OR TIME LEV III 1ST 15 MIN: Performed by: ORTHOPAEDIC SURGERY

## 2022-03-28 PROCEDURE — 24342 PR REINSERT BI/TRICEPS TENDON,DISTAL: ICD-10-PCS | Mod: RT,,, | Performed by: ORTHOPAEDIC SURGERY

## 2022-03-28 DEVICE — SYS IMPL DEL DISTAL BICEPS BC: Type: IMPLANTABLE DEVICE | Site: ARM | Status: FUNCTIONAL

## 2022-03-28 RX ORDER — HYDROMORPHONE HYDROCHLORIDE 2 MG/ML
0.1 INJECTION, SOLUTION INTRAMUSCULAR; INTRAVENOUS; SUBCUTANEOUS ONCE
Status: COMPLETED | OUTPATIENT
Start: 2022-03-28 | End: 2022-03-28

## 2022-03-28 RX ORDER — LIDOCAINE HCL/PF 100 MG/5ML
SYRINGE (ML) INTRAVENOUS
Status: DISCONTINUED | OUTPATIENT
Start: 2022-03-28 | End: 2022-03-28

## 2022-03-28 RX ORDER — MEPERIDINE HYDROCHLORIDE 25 MG/ML
12.5 INJECTION INTRAMUSCULAR; INTRAVENOUS; SUBCUTANEOUS ONCE
Status: DISCONTINUED | OUTPATIENT
Start: 2022-03-28 | End: 2022-03-28 | Stop reason: HOSPADM

## 2022-03-28 RX ORDER — SODIUM CHLORIDE, SODIUM LACTATE, POTASSIUM CHLORIDE, CALCIUM CHLORIDE 600; 310; 30; 20 MG/100ML; MG/100ML; MG/100ML; MG/100ML
INJECTION, SOLUTION INTRAVENOUS
Status: ACTIVE | OUTPATIENT
Start: 2022-03-28

## 2022-03-28 RX ORDER — FENTANYL CITRATE 50 UG/ML
INJECTION, SOLUTION INTRAMUSCULAR; INTRAVENOUS
Status: DISCONTINUED | OUTPATIENT
Start: 2022-03-28 | End: 2022-03-28

## 2022-03-28 RX ORDER — ONDANSETRON 2 MG/ML
4 INJECTION INTRAMUSCULAR; INTRAVENOUS ONCE AS NEEDED
Status: DISCONTINUED | OUTPATIENT
Start: 2022-03-28 | End: 2022-03-28 | Stop reason: HOSPADM

## 2022-03-28 RX ORDER — DIPHENHYDRAMINE HYDROCHLORIDE 50 MG/ML
25 INJECTION INTRAMUSCULAR; INTRAVENOUS EVERY 6 HOURS PRN
Status: DISCONTINUED | OUTPATIENT
Start: 2022-03-28 | End: 2022-03-28 | Stop reason: HOSPADM

## 2022-03-28 RX ORDER — MIDAZOLAM HYDROCHLORIDE 1 MG/ML
INJECTION INTRAMUSCULAR; INTRAVENOUS
Status: DISCONTINUED | OUTPATIENT
Start: 2022-03-28 | End: 2022-03-28

## 2022-03-28 RX ORDER — ACETAMINOPHEN 10 MG/ML
INJECTION, SOLUTION INTRAVENOUS
Status: DISCONTINUED | OUTPATIENT
Start: 2022-03-28 | End: 2022-03-28

## 2022-03-28 RX ORDER — ALBUTEROL SULFATE 0.83 MG/ML
2.5 SOLUTION RESPIRATORY (INHALATION) EVERY 4 HOURS PRN
Status: DISCONTINUED | OUTPATIENT
Start: 2022-03-28 | End: 2022-03-28 | Stop reason: HOSPADM

## 2022-03-28 RX ORDER — DEXAMETHASONE SODIUM PHOSPHATE 4 MG/ML
INJECTION, SOLUTION INTRA-ARTICULAR; INTRALESIONAL; INTRAMUSCULAR; INTRAVENOUS; SOFT TISSUE
Status: DISCONTINUED | OUTPATIENT
Start: 2022-03-28 | End: 2022-03-28

## 2022-03-28 RX ORDER — DEXMEDETOMIDINE HYDROCHLORIDE 100 UG/ML
INJECTION, SOLUTION INTRAVENOUS
Status: DISCONTINUED | OUTPATIENT
Start: 2022-03-28 | End: 2022-03-28

## 2022-03-28 RX ORDER — HYDROCODONE BITARTRATE AND ACETAMINOPHEN 5; 325 MG/1; MG/1
1 TABLET ORAL
Status: DISCONTINUED | OUTPATIENT
Start: 2022-03-28 | End: 2022-03-28 | Stop reason: HOSPADM

## 2022-03-28 RX ORDER — PROPOFOL 10 MG/ML
INJECTION, EMULSION INTRAVENOUS
Status: DISCONTINUED | OUTPATIENT
Start: 2022-03-28 | End: 2022-03-28

## 2022-03-28 RX ORDER — OXYCODONE AND ACETAMINOPHEN 10; 325 MG/1; MG/1
1 TABLET ORAL EVERY 6 HOURS PRN
Qty: 28 TABLET | Refills: 0 | Status: SHIPPED | OUTPATIENT
Start: 2022-03-28 | End: 2022-04-08

## 2022-03-28 RX ORDER — ONDANSETRON 2 MG/ML
INJECTION INTRAMUSCULAR; INTRAVENOUS
Status: DISCONTINUED | OUTPATIENT
Start: 2022-03-28 | End: 2022-03-28

## 2022-03-28 RX ORDER — BUPIVACAINE HYDROCHLORIDE 2.5 MG/ML
INJECTION, SOLUTION EPIDURAL; INFILTRATION; INTRACAUDAL
Status: DISCONTINUED | OUTPATIENT
Start: 2022-03-28 | End: 2022-03-28 | Stop reason: HOSPADM

## 2022-03-28 RX ORDER — CEFAZOLIN SODIUM 2 G/50ML
2 SOLUTION INTRAVENOUS
Status: COMPLETED | OUTPATIENT
Start: 2022-03-28 | End: 2022-03-28

## 2022-03-28 RX ORDER — SODIUM CHLORIDE, SODIUM LACTATE, POTASSIUM CHLORIDE, CALCIUM CHLORIDE 600; 310; 30; 20 MG/100ML; MG/100ML; MG/100ML; MG/100ML
INJECTION, SOLUTION INTRAVENOUS CONTINUOUS
Status: DISCONTINUED | OUTPATIENT
Start: 2022-03-28 | End: 2022-03-28 | Stop reason: HOSPADM

## 2022-03-28 RX ORDER — FENTANYL CITRATE 50 UG/ML
25 INJECTION, SOLUTION INTRAMUSCULAR; INTRAVENOUS EVERY 5 MIN PRN
Status: COMPLETED | OUTPATIENT
Start: 2022-03-28 | End: 2022-03-28

## 2022-03-28 RX ADMIN — DEXMEDETOMIDINE HYDROCHLORIDE 4 MCG: 100 INJECTION, SOLUTION INTRAVENOUS at 09:03

## 2022-03-28 RX ADMIN — ACETAMINOPHEN 1000 MG: 10 INJECTION, SOLUTION INTRAVENOUS at 08:03

## 2022-03-28 RX ADMIN — FENTANYL CITRATE 25 MCG: 50 INJECTION, SOLUTION INTRAMUSCULAR; INTRAVENOUS at 08:03

## 2022-03-28 RX ADMIN — ONDANSETRON 4 MG: 2 INJECTION, SOLUTION INTRAMUSCULAR; INTRAVENOUS at 08:03

## 2022-03-28 RX ADMIN — LORAZEPAM 0.25 MG: 2 INJECTION INTRAMUSCULAR; INTRAVENOUS at 10:03

## 2022-03-28 RX ADMIN — FENTANYL CITRATE 25 MCG: 50 INJECTION, SOLUTION INTRAMUSCULAR; INTRAVENOUS at 10:03

## 2022-03-28 RX ADMIN — Medication 75 MG: at 08:03

## 2022-03-28 RX ADMIN — FENTANYL CITRATE 50 MCG: 50 INJECTION, SOLUTION INTRAMUSCULAR; INTRAVENOUS at 08:03

## 2022-03-28 RX ADMIN — PROPOFOL 40 MG: 10 INJECTION, EMULSION INTRAVENOUS at 08:03

## 2022-03-28 RX ADMIN — PROPOFOL 200 MG: 10 INJECTION, EMULSION INTRAVENOUS at 08:03

## 2022-03-28 RX ADMIN — CEFAZOLIN SODIUM 2 G: 2 SOLUTION INTRAVENOUS at 08:03

## 2022-03-28 RX ADMIN — HYDROMORPHONE HYDROCHLORIDE 0.1 MG: 2 INJECTION INTRAMUSCULAR; INTRAVENOUS; SUBCUTANEOUS at 11:03

## 2022-03-28 RX ADMIN — SODIUM CHLORIDE, SODIUM LACTATE, POTASSIUM CHLORIDE, AND CALCIUM CHLORIDE: 600; 310; 30; 20 INJECTION, SOLUTION INTRAVENOUS at 09:03

## 2022-03-28 RX ADMIN — DEXAMETHASONE SODIUM PHOSPHATE 4 MG: 4 INJECTION, SOLUTION INTRA-ARTICULAR; INTRALESIONAL; INTRAMUSCULAR; INTRAVENOUS; SOFT TISSUE at 08:03

## 2022-03-28 RX ADMIN — SODIUM CHLORIDE, SODIUM LACTATE, POTASSIUM CHLORIDE, AND CALCIUM CHLORIDE: 600; 310; 30; 20 INJECTION, SOLUTION INTRAVENOUS at 06:03

## 2022-03-28 RX ADMIN — MIDAZOLAM HYDROCHLORIDE 2 MG: 1 INJECTION INTRAMUSCULAR; INTRAVENOUS at 08:03

## 2022-03-28 RX ADMIN — HYDROCODONE BITARTRATE AND ACETAMINOPHEN 1 TABLET: 5; 325 TABLET ORAL at 10:03

## 2022-03-28 RX ADMIN — DEXMEDETOMIDINE HYDROCHLORIDE 4 MCG: 100 INJECTION, SOLUTION INTRAVENOUS at 08:03

## 2022-03-28 NOTE — DISCHARGE INSTRUCTIONS
Nozin Instructions  Goal: the goal of Nozin is to reduce the risk of post-procedural infections by bacteria in the nasal cavity. Think of it as hand  for your nose.    How to use:    1. Shake Nozin bottle well    2. Take a cotton swab and apply 4 drops to one tip    3. Insert cotton tip into one nostril, being sure not to go deeper into nose than tip of the swab.    4. Swab nostril 6 times counterclockwise and 6 times clockwise. Make sure to swab the inside front pocket of the nostril.    5. Take swab out and apply 2 drops to the same cotton tip. Repeat steps 3 and 4 in the other nostril.        Do steps 1-5 twice a day for 7 days.     After Hand Surgery  After surgery, the better you take care of yourself--especially your hand--the sooner it will heal. Follow your surgeons instructions. Try not to bump your hand, and dont move or lift anything while youre still wearing bandages, a splint, or a cast.  Care for your hand    Keep your hand elevated above heart level as much as possible for the first several days after surgery. This helps reduce swelling and pain.  To help prevent infection and speed healing, take care not to get your cast or bandages wet.  Relieve pain as directed  Your surgeon may prescribe pain medicine or suggest you take an anti-inflammatory medicine. You might also be instructed to apply ice (or another cold source) to your hand. If you use ice cubes, put them in a plastic bag and rest it on top of your bandages. Leave the cold source on your hand for as long as its comfortable. Do this several times a day for the first few days after surgery. It may take several minutes before you can feel the cold through the cast or bandages.  Follow up with your surgeon  During a follow-up visit after surgery, your surgeon will check your progress. The stitches, bandages, splint, or cast may be removed. A new cast or splint may be placed. If your hand has healed enough, your surgeon may prescribe  exercises.  Do prescribed hand exercises  Your surgeon may recommend that you do exercises. These may be done under the guidance of a physical or occupational therapist. The exercises strengthen your hand, help you regain flexibility, and restore proper function. Do the exercises as advised.  Call your surgeon if you have...  A fever higher than 100.4°F (38.0°C) taken by mouth  Side effects from your medicine, such as prolonged nausea  A wet or loose dressing, or a dressing that is too tight  Excessive bleeding  Increased, ongoing pain or numbness  Signs of infection (such as drainage, warmth, or redness) at the incision site   Date Last Reviewed: 11/11/2015  © 2098-3376 [employer]. [address] [place] [address], [place] [address]. All rights reserved. This information is not intended as a substitute for professional medical care. Always follow your healthcare professional's instructions.

## 2022-03-28 NOTE — PLAN OF CARE
Pt remains on 3L NC stating 95%. IS teaching provided and preformed w/ follow up. SBP remains 160-170s/99. Dr. Chávez notified. No new orders at this time. Per Dr. Chávez, send pt to [facility] to be closely monitored; O2 & BP to be back to baseline for discharge.     Report given to ISELA Kelly & ISELA Keith. Pt transferred to [facility] 5. Stretcher in lowest locked potion, call light in place, side rail up x2. O2 in place. RN at bedside.     Pt's wife updated and at bedside. POC reviewed with Dimas Darden and family. Discharge instructions provided, AVS printed and reviewed; including all discharge instructions, follow up appointments, medications, reasons to contact the doctor, and when to report to the ER. Questions and concerns addressed, family verbalized understanding & provided teach back. Will continue to monitor. See flowsheets for full assessment and VS info.

## 2022-03-28 NOTE — CARE UPDATE
Received patient resting quietly on stretcher with spouse and PACU nurse at bedside. Safety measures in place. Patient states that pain is at an 8 out of 10 but is tolerable at this time.  Will continue to monitor.

## 2022-03-28 NOTE — TRANSFER OF CARE
"Anesthesia Transfer of Care Note    Patient: Dimas Darden    Procedure(s) Performed: Procedure(s) (LRB):  REPAIR, TENDON, BICEPS, DISTAL (Right)    Patient location: PACU    Anesthesia Type: general    Transport from OR: Transported from OR on room air with adequate spontaneous ventilation    Post pain: adequate analgesia    Post assessment: no apparent anesthetic complications and tolerated procedure well    Post vital signs: stable    Level of consciousness: awake    Nausea/Vomiting: no nausea/vomiting    Complications: none    Transfer of care protocol was followed      Last vitals:   Visit Vitals  /89 (BP Location: Left arm, Patient Position: Sitting)   Pulse 75   Temp 36.6 °C (97.9 °F) (Temporal)   Resp 17   Ht 6' 2" (1.88 m)   Wt 109.4 kg (241 lb 1.2 oz)   SpO2 95%   BMI 30.95 kg/m²     "

## 2022-03-28 NOTE — ANESTHESIA POSTPROCEDURE EVALUATION
Anesthesia Post Evaluation    Patient: Dimas Darden    Procedure(s) Performed: Procedure(s) (LRB):  REPAIR, TENDON, BICEPS, DISTAL (Right)    Final Anesthesia Type: general      Patient location during evaluation: PACU  Patient participation: Yes- Able to Participate  Level of consciousness: awake and alert and oriented  Post-procedure vital signs: reviewed and stable  Pain management: adequate  Airway patency: patent    PONV status at discharge: No PONV  Anesthetic complications: no      Cardiovascular status: blood pressure returned to baseline, stable and hemodynamically stable  Respiratory status: unassisted  Hydration status: euvolemic  Follow-up not needed.          Vitals Value Taken Time   /94 03/28/22 1210   Temp 36.7 °C (98.1 °F) 03/28/22 1210   Pulse 81 03/28/22 1210   Resp 18 03/28/22 1210   SpO2 96 % 03/28/22 1210         Event Time   Out of Recovery 11:45:00         Pain/Sal Score: Pain Rating Prior to Med Admin: 9 (3/28/2022 11:08 AM)  Sal Score: 8 (3/28/2022 11:53 AM)

## 2022-03-28 NOTE — OP NOTE
[facility]  General Surgery  Operative Note    SUMMARY     Date of Procedure: 3/28/2022     Procedure: Procedure(s) (LRB):  REPAIR, TENDON, BICEPS, DISTAL (Right)   using Arthrex technique    Surgeon(s) and Role:     * Rafael Blood MD - Primary    Assisting Surgeon: None    Pre-Operative Diagnosis: Traumatic rupture of right distal biceps tendon,    Post-Operative Diagnosis: Post-Op Diagnosis Codes:     * Traumatic rupture of right distal biceps tendon,     Anesthesia: General    Description:  The patient is a 56-year-old male status post right distal biceps tendon rupture date of injury was 02/18/2022.  He is taken to the operating room for repair.  He is left-hand dominant.  The patient was taken to the operating room where general anesthesia was administered.  Satisfactory anesthesia had been achieved the right arm was prepped and draped in the usual sterile fashion.  After exsanguination of the extremity a proximal tourniquet was inflated to 250 mmHg after patient received 2 g of Ancef intravenously preoperatively.  At this time a 4 cm transverse incision was made at the level of the bicipital tubercle.  The incision extended using blunt and sharp dissection using 3.5 loupe magnification.  The lateral antebrachial cutaneous nerves identified and carefully retracted.  The biceps tendon was found to be retracted and significantly scarred down.  This was freed up from scar and developed and a Arthrex 2. FiberLoop was used to pass 3 Krackow sutures.  The straight needle was cut off.  Attention was directed to the bicipital tubercle.  A approach was developed between the mobile wad of 3 and the radial nerve was identified and carefully retracted.  A guidewire was placed under image intensification and judged to be in satisfactory position and drilled bicortically.  At this time the number 8 mm Reamer was used and this was used unicortically.  At this time the Endobutton was used and these  Gael needle was used to pass the suture through the endo-button using standard technique.  The but was able to slide.  The  was placed through the 2nd cortex and the Endobutton was deployed and tension.  The biceps tendon was noted to be advanced into the drill hole.  A nocturnal needle was used to pass 1 limb of the suture through the peek screw and through the screwdriver handle.  The screw was deployed with good interference.  The suture was tied to itself and a free curved needle was used to pass 1 limb of the suture through the tendon and this was again tied to the other limb of the suture.  The tourniquet was deflated.  Hemostasis achieved using pressure and electrocautery.  Subcutaneous tissue was closed using 3 0 Vicryl suture.  Skin was closed using horizontal mattress 4 0 nylon suture.  Xeroform gauze 4x4s and 2 ABD pads were over wrapped with a well-padded posterior splint in 90°.  A sling was applied.  The patient tolerated the procedure well was transferred to the recovery room in satisfactory condition.                                                    Significant Surgical Tasks Conducted by the Assistant(s), if Applicable:  No assistant    Complications:  None     Estimated Blood Loss (EBL):  15 mL           Implants: Arthrex endo-button and peek interference screw    Specimens: None           Condition: Good    Disposition: PACU - hemodynamically stable.    Attestation: I was present and scrubbed for the entire procedure.

## 2022-03-28 NOTE — CARE UPDATE
Patient meets criteria to be discharged home.  Patient to private vehicle with belongings via wheelchair.  Patient with no distress noted upon discharge.

## 2022-03-28 NOTE — CARE UPDATE
Patient states that pain level is now at a 3. Spouse at bedside with no concerns voiced at this time. Patient meets criteria for discharge and patient's wife with assist patient with getting dressed.

## 2022-03-28 NOTE — PLAN OF CARE
Pt's pain despite 10/10 despite Fentanyl, Norco, and Ativan dose for aggitation. Pt's SBP range 170s-180s. Pt remains irritable and restless, frequently positioning self. Dr. Chávez notified. Verbal order for Dilaudid 0.1 mg x2 doses. Order placed, verified by pharmacy, & given. Pt remains on 3L NC stating 95%.

## 2022-03-28 NOTE — DISCHARGE SUMMARY
[facility]  Discharge Note  Short Stay    Procedure(s) (LRB):  REPAIR, TENDON, BICEPS, DISTAL (Right)    OUTCOME: Patient tolerated treatment/procedure well without complication and is now ready for discharge.    DISPOSITION: Home or Self Care    FINAL DIAGNOSIS:  Right distal biceps tendon rupture    FOLLOWUP: In clinic    DISCHARGE INSTRUCTIONS:  No discharge procedures on file.     TIME SPENT ON DISCHARGE:  20 minutes

## 2022-03-28 NOTE — CARE UPDATE
Patient continues to rest comfortably on stretcher with no complaints voiced at this time. Spouse assisting patient with urinal, patient voiding without any problems. Safety measures in place. Will continue to monitor.

## 2022-03-31 ENCOUNTER — OFFICE VISIT (OUTPATIENT)
Dept: ORTHOPEDICS | Facility: CLINIC | Age: 57
End: 2022-03-31
Payer: COMMERCIAL

## 2022-03-31 VITALS
TEMPERATURE: 98 F | DIASTOLIC BLOOD PRESSURE: 98 MMHG | SYSTOLIC BLOOD PRESSURE: 156 MMHG | HEIGHT: 74 IN | BODY MASS INDEX: 30.95 KG/M2 | WEIGHT: 241.19 LBS | HEART RATE: 68 BPM

## 2022-03-31 DIAGNOSIS — S46.211D TRAUMATIC RUPTURE OF RIGHT DISTAL BICEPS TENDON, SUBSEQUENT ENCOUNTER: Primary | ICD-10-CM

## 2022-03-31 PROCEDURE — 3080F DIAST BP >= 90 MM HG: CPT | Mod: CPTII,S$GLB,, | Performed by: PHYSICIAN ASSISTANT

## 2022-03-31 PROCEDURE — 99999 PR PBB SHADOW E&M-EST. PATIENT-LVL III: CPT | Mod: PBBFAC,,, | Performed by: PHYSICIAN ASSISTANT

## 2022-03-31 PROCEDURE — 3077F SYST BP >= 140 MM HG: CPT | Mod: CPTII,S$GLB,, | Performed by: PHYSICIAN ASSISTANT

## 2022-03-31 PROCEDURE — 3008F BODY MASS INDEX DOCD: CPT | Mod: CPTII,S$GLB,, | Performed by: PHYSICIAN ASSISTANT

## 2022-03-31 PROCEDURE — 1159F PR MEDICATION LIST DOCUMENTED IN MEDICAL RECORD: ICD-10-PCS | Mod: CPTII,S$GLB,, | Performed by: PHYSICIAN ASSISTANT

## 2022-03-31 PROCEDURE — 1160F PR REVIEW ALL MEDS BY PRESCRIBER/CLIN PHARMACIST DOCUMENTED: ICD-10-PCS | Mod: CPTII,S$GLB,, | Performed by: PHYSICIAN ASSISTANT

## 2022-03-31 PROCEDURE — 1159F MED LIST DOCD IN RCRD: CPT | Mod: CPTII,S$GLB,, | Performed by: PHYSICIAN ASSISTANT

## 2022-03-31 PROCEDURE — 99024 PR POST-OP FOLLOW-UP VISIT: ICD-10-PCS | Mod: S$GLB,,, | Performed by: PHYSICIAN ASSISTANT

## 2022-03-31 PROCEDURE — 3080F PR MOST RECENT DIASTOLIC BLOOD PRESSURE >= 90 MM HG: ICD-10-PCS | Mod: CPTII,S$GLB,, | Performed by: PHYSICIAN ASSISTANT

## 2022-03-31 PROCEDURE — 1160F RVW MEDS BY RX/DR IN RCRD: CPT | Mod: CPTII,S$GLB,, | Performed by: PHYSICIAN ASSISTANT

## 2022-03-31 PROCEDURE — 3077F PR MOST RECENT SYSTOLIC BLOOD PRESSURE >= 140 MM HG: ICD-10-PCS | Mod: CPTII,S$GLB,, | Performed by: PHYSICIAN ASSISTANT

## 2022-03-31 PROCEDURE — 99024 POSTOP FOLLOW-UP VISIT: CPT | Mod: S$GLB,,, | Performed by: PHYSICIAN ASSISTANT

## 2022-03-31 PROCEDURE — 3008F PR BODY MASS INDEX (BMI) DOCUMENTED: ICD-10-PCS | Mod: CPTII,S$GLB,, | Performed by: PHYSICIAN ASSISTANT

## 2022-03-31 PROCEDURE — 99999 PR PBB SHADOW E&M-EST. PATIENT-LVL III: ICD-10-PCS | Mod: PBBFAC,,, | Performed by: PHYSICIAN ASSISTANT

## 2022-04-07 NOTE — PROGRESS NOTES
"Subjective:      Patient ID: Dimas Darden is a 56 y.o. male.    Chief Complaint: Post-op Evaluation and Pain of the Right Elbow      HPI: Dimas Darden is a 56-year-old male in clinic today for postoperative follow-up.  Patient is 3 days status post repair of the right distal biceps tendon using Arthrex system.  Patient is doing well this time.  Still wearing the splint that was placed in the operating room.  No new complaints at this time    Past Medical History:   Diagnosis Date    Back pain     Digestive disorder     Disc     Bulging disc in back    Diverticulitis     Hypertension        Current Outpatient Medications:     amLODIPine (NORVASC) 5 MG tablet, Take 1 tablet (5 mg total) by mouth once daily., Disp: 90 tablet, Rfl: 1    ciprofloxacin HCl (CIPRO) 500 MG tablet, Take 1 tablet (500 mg total) by mouth 2 (two) times daily., Disp: 20 tablet, Rfl: 0    methocarbamoL (ROBAXIN) 500 MG Tab, TAKE 1 TABLET BY MOUTH 4 TIMES DAILY FOR 10 DAYS, Disp: 40 tablet, Rfl: 0    oxyCODONE-acetaminophen (PERCOCET)  mg per tablet, Take 1 tablet by mouth every 6 (six) hours as needed for Pain., Disp: 28 tablet, Rfl: 0    pantoprazole (PROTONIX) 40 MG tablet, Take 1 tablet (40 mg total) by mouth once daily., Disp: 90 tablet, Rfl: 3    traMADoL (ULTRAM) 50 mg tablet, Take 1 tablet (50 mg total) by mouth every 4 (four) hours as needed for Pain. for pain., Disp: 40 tablet, Rfl: 0    Current Facility-Administered Medications:     lidocaine (PF) 10 mg/ml (1%) injection 10 mg, 1 mL, Intradermal, Once, Mat Guerrero MD    Facility-Administered Medications Ordered in Other Visits:     lactated ringers infusion, , Intravenous, On Call Procedure, TABATHA Roger  Review of patient's allergies indicates:  No Known Allergies    BP (!) 156/98 (BP Location: Left arm, Patient Position: Sitting, BP Method: Large (Automatic))   Pulse 68   Temp 97.6 °F (36.4 °C)   Ht 6' 2" (1.88 m)   Wt 109.4 kg (241 lb 2.9 " oz)   BMI 30.97 kg/m²     ROS      Objective:    Ortho Exam   Right arm:  Splint removed for physical exam  Sutures intact, wound margins well approximated, no signs of infection  Moderate edema  Mild TTP  ROM elbow decreased secondary to pain/stiffness  Motor exam normal  Sensation and pulses intact    GEN: Well developed, well nourished male. AAOX3. No acute distress.   Normocephalic, atraumatic.   SHA  Breathing unlabored.  Mood and affect appropriate.        Assessment:     Imaging:  No new imaging ordered today        1. Traumatic rupture of right distal biceps tendon, subsequent encounter          Plan:       Incision cleaned with hydrogen peroxide and covered with nonstick gauze, light wrap, and Ace bandage.  Patient instructed to continue with the sling as needed.  May begin gentle ROM, but no lifting greater than a coffee cup.  Patient may remove dressings and only cover with a sterile, waterproof bandage.  Keep sutures clean and dry until follow-up in about 10 days for suture removal     Follow up in about 10 days (around 4/10/2022).          Patient note was created using MModal Dictation.  Any errors in syntax or even information may not have been identified and edited on initial review prior to signing this note.

## 2022-04-08 ENCOUNTER — OFFICE VISIT (OUTPATIENT)
Dept: ORTHOPEDICS | Facility: CLINIC | Age: 57
End: 2022-04-08
Payer: COMMERCIAL

## 2022-04-08 VITALS — BODY MASS INDEX: 30.93 KG/M2 | HEIGHT: 74 IN | WEIGHT: 241 LBS

## 2022-04-08 DIAGNOSIS — G89.29 CHRONIC LOW BACK PAIN WITHOUT SCIATICA, UNSPECIFIED BACK PAIN LATERALITY: ICD-10-CM

## 2022-04-08 DIAGNOSIS — M54.50 CHRONIC LOW BACK PAIN WITHOUT SCIATICA, UNSPECIFIED BACK PAIN LATERALITY: ICD-10-CM

## 2022-04-08 DIAGNOSIS — S46.211D TRAUMATIC RUPTURE OF RIGHT DISTAL BICEPS TENDON, SUBSEQUENT ENCOUNTER: Primary | ICD-10-CM

## 2022-04-08 PROCEDURE — 1160F PR REVIEW ALL MEDS BY PRESCRIBER/CLIN PHARMACIST DOCUMENTED: ICD-10-PCS | Mod: CPTII,S$GLB,, | Performed by: PHYSICIAN ASSISTANT

## 2022-04-08 PROCEDURE — 99024 POSTOP FOLLOW-UP VISIT: CPT | Mod: S$GLB,,, | Performed by: PHYSICIAN ASSISTANT

## 2022-04-08 PROCEDURE — 3008F PR BODY MASS INDEX (BMI) DOCUMENTED: ICD-10-PCS | Mod: CPTII,S$GLB,, | Performed by: PHYSICIAN ASSISTANT

## 2022-04-08 PROCEDURE — 99999 PR PBB SHADOW E&M-EST. PATIENT-LVL III: CPT | Mod: PBBFAC,,, | Performed by: PHYSICIAN ASSISTANT

## 2022-04-08 PROCEDURE — 1159F MED LIST DOCD IN RCRD: CPT | Mod: CPTII,S$GLB,, | Performed by: PHYSICIAN ASSISTANT

## 2022-04-08 PROCEDURE — 1160F RVW MEDS BY RX/DR IN RCRD: CPT | Mod: CPTII,S$GLB,, | Performed by: PHYSICIAN ASSISTANT

## 2022-04-08 PROCEDURE — 99024 PR POST-OP FOLLOW-UP VISIT: ICD-10-PCS | Mod: S$GLB,,, | Performed by: PHYSICIAN ASSISTANT

## 2022-04-08 PROCEDURE — 99999 PR PBB SHADOW E&M-EST. PATIENT-LVL III: ICD-10-PCS | Mod: PBBFAC,,, | Performed by: PHYSICIAN ASSISTANT

## 2022-04-08 PROCEDURE — 1159F PR MEDICATION LIST DOCUMENTED IN MEDICAL RECORD: ICD-10-PCS | Mod: CPTII,S$GLB,, | Performed by: PHYSICIAN ASSISTANT

## 2022-04-08 PROCEDURE — 3008F BODY MASS INDEX DOCD: CPT | Mod: CPTII,S$GLB,, | Performed by: PHYSICIAN ASSISTANT

## 2022-04-08 RX ORDER — TRAMADOL HYDROCHLORIDE 50 MG/1
50 TABLET ORAL EVERY 8 HOURS PRN
Qty: 21 TABLET | Refills: 0 | Status: SHIPPED | OUTPATIENT
Start: 2022-04-08 | End: 2022-04-22

## 2022-04-08 NOTE — PROGRESS NOTES
Patient ID: Dimas Darden is a 56 y.o. male.    Chief Complaint: Pain and Post-op Evaluation of the Right Upper Arm      HPI: Dimas Darden  is a 56 y.o. male who c/o Pain and Post-op Evaluation of the Right Upper Arm     Post op visit 2  Pain is at worse a 2/10  Patient states he is doing okay since surgery although is still experiencing numbness tingling and loss of function  He states he is no longer taking any narcotic pain medication.  He is taking over-the-counter pain medication if needed  He has been wrapping the extremity additionally with an Ace wrap for comfort and support.  Not using any type of sling  He inquires about therapy for regain of full function which I agree with and will place the order for today after suture removal  He has been fully compliant with postop instructions and keeping the extremity dry    Patient is presently denying any shortness of breath, chest pain, fever/chills, nausea/vomiting, loss of taste or smell, numbness/tingling or sensation changes, loss of bladder or bowel function.      Procedure: Right bicep tendon repair  DOS:  03/28/2022    Past Medical History:   Diagnosis Date    Back pain     Digestive disorder     Disc     Bulging disc in back    Diverticulitis     Hypertension      Past Surgical History:   Procedure Laterality Date    COLONOSCOPY N/A 3/13/2020    Procedure: COLONOSCOPY;  Surgeon: Mat Guerrero MD;  Location: [facility];  Service: General;  Laterality: N/A;    ESOPHAGOGASTRODUODENOSCOPY N/A 4/15/2021    Procedure: EGD (ESOPHAGOGASTRODUODENOSCOPY) needs rapid COVID;  Surgeon: Josie Livingston MD;  Location: [facility];  Service: Endoscopy;  Laterality: N/A;    INJECTION OF ANESTHETIC AGENT INTO TISSUE PLANE DEFINED BY TRANSVERSUS ABDOMINIS MUSCLE N/A 6/8/2021    Procedure: BLOCK, TRANSVERSUS ABDOMINIS PLANE;  Surgeon: Mat Guerrero MD;  Location: [facility];  Service: General;  Laterality: N/A;    LAPAROSCOPIC SIGMOIDECTOMY N/A  6/8/2021    Procedure: COLECTOMY, SIGMOID, LAPAROSCOPIC;  Surgeon: Mat Guerrero MD;  Location: [facility] OR;  Service: General;  Laterality: N/A;    MOBILIZATION OF SPLENIC FLEXURE N/A 6/8/2021    Procedure: MOBILIZATION, SPLENIC FLEXURE;  Surgeon: Mat Guerrero MD;  Location: [facility] OR;  Service: General;  Laterality: N/A;  Laparoscopic    UNDESCENDED TESTICLE EXPLORATION Left     Age 15     Family History   Problem Relation Age of Onset    Diverticulitis Mother     No Known Problems Father     No Known Problems Sister     No Known Problems Brother     No Known Problems Brother      Social History     Socioeconomic History    Marital status:    Tobacco Use    Smoking status: Former Smoker     Packs/day: 1.00     Years: 25.00     Pack years: 25.00     Quit date: 4/10/2012     Years since quitting: 10.0    Smokeless tobacco: Never Used   Substance and Sexual Activity    Alcohol use: Yes     Alcohol/week: 2.0 standard drinks     Types: 2 Glasses of wine per week     Comment: Socially on the weekend    Drug use: No     Social Determinants of Health     Financial Resource Strain: Medium Risk    Difficulty of Paying Living Expenses: Somewhat hard   Food Insecurity: No Food Insecurity    Worried About Running Out of Food in the Last Year: Never true    Ran Out of Food in the Last Year: Never true   Transportation Needs: No Transportation Needs    Lack of Transportation (Medical): No    Lack of Transportation (Non-Medical): No   Physical Activity: Insufficiently Active    Days of Exercise per Week: 3 days    Minutes of Exercise per Session: 30 min   Stress: No Stress Concern Present    Feeling of Stress : Not at all   Social Connections: Unknown    Frequency of Communication with Friends and Family: Twice a week    Frequency of Social Gatherings with Friends and Family: Once a week    Attends Club or Organization Meetings: Never    Marital Status:    Housing Stability: Low Risk      Unable to Pay for Housing in the Last Year: No    Number of Places Lived in the Last Year: 1    Unstable Housing in the Last Year: No     Medication List with Changes/Refills   Current Medications    AMLODIPINE (NORVASC) 5 MG TABLET    Take 1 tablet (5 mg total) by mouth once daily.    CIPROFLOXACIN HCL (CIPRO) 500 MG TABLET    Take 1 tablet (500 mg total) by mouth 2 (two) times daily.    METHOCARBAMOL (ROBAXIN) 500 MG TAB    TAKE 1 TABLET BY MOUTH 4 TIMES DAILY FOR 10 DAYS    OXYCODONE-ACETAMINOPHEN (PERCOCET)  MG PER TABLET    Take 1 tablet by mouth every 6 (six) hours as needed for Pain.    PANTOPRAZOLE (PROTONIX) 40 MG TABLET    Take 1 tablet (40 mg total) by mouth once daily.    TRAMADOL (ULTRAM) 50 MG TABLET    Take 1 tablet (50 mg total) by mouth every 4 (four) hours as needed for Pain. for pain.     Review of patient's allergies indicates:  No Known Allergies    Objective:     Right Hand  2+ rad pulse  Comp soft  Sensation intact to light touch and point discrimination; main notice of defect is to the medial forearm  Inc C/D/I  No SOI  Cap refill < 2 sec  Motor intact to hand and wrist; encouraged gentle range of motion in use    Assessment:       Encounter Diagnosis   Name Primary?    Traumatic rupture of right distal biceps tendon, subsequent encounter Yes          Plan:       Dimas was seen today for pain and post-op evaluation.    Diagnoses and all orders for this visit:    Traumatic rupture of right distal biceps tendon, subsequent encounter        Dimas Darden is an established pt here for postop follow-up.  Patient asked for refill on tramadol as this helps with the muscle pain and allows him to sleep at night.  The incision was cleaned with hydrogen peroxide and normal saline. All sutures were removed and steri- strips were placed over the incision. These are to remain on for 1-2 weeks and will fall off on their own. Patient is now allowed to let water run over the incision but  should continue to refrain from soaking the hand in standing water like a tub or doing dishes without a protective layer. Patient may clean the incision daily as above.  He may continue to use the Ace wrap as needed for support.  Patient should notify the office of any signs or symptoms of infection including fevers, erythema, purulent drainage, increasing pain.  Patient will follow up as scheduled, I would like him to return in roughly 4 weeks to see Dr. Blood.  He verbalizes understanding and agrees.  I will place the order for therapy today.    No follow-ups on file.    The patient understands, chooses and consents to this plan and accepts all   the risks which include but are not limited to the risks mentioned above.     Disclaimer: This note was prepared using a voice recognition system and is likely to have sound alike errors within the text.

## 2022-04-11 ENCOUNTER — PATIENT MESSAGE (OUTPATIENT)
Dept: ORTHOPEDICS | Facility: CLINIC | Age: 57
End: 2022-04-11
Payer: COMMERCIAL

## 2022-04-19 ENCOUNTER — PATIENT MESSAGE (OUTPATIENT)
Dept: ORTHOPEDICS | Facility: CLINIC | Age: 57
End: 2022-04-19
Payer: COMMERCIAL

## 2022-04-21 ENCOUNTER — PATIENT MESSAGE (OUTPATIENT)
Dept: ORTHOPEDICS | Facility: CLINIC | Age: 57
End: 2022-04-21
Payer: COMMERCIAL

## 2022-04-21 DIAGNOSIS — K21.9 GASTROESOPHAGEAL REFLUX DISEASE WITHOUT ESOPHAGITIS: ICD-10-CM

## 2022-04-21 NOTE — TELEPHONE ENCOUNTER
No new care gaps identified.  Powered by Access[employer]. Reference number: [id number].   4/21/2022 5:58:48 PM CDT

## 2022-04-21 NOTE — TELEPHONE ENCOUNTER
Refill Routing Note   Medication(s) are not appropriate for processing by [facility] for the following reason(s):      - Patient has been seen in the ED/Hospital since the last PCP visit    [facility] action(s):  Defer          Medication reconciliation completed: No     Appointments  past 12m or future 3m with PCP    Date Provider   Last Visit   5/6/2021 Angela Payne MD   Next Visit   Visit date not found Angela Payne MD   ED visits in past 90 days: 0        Note composed:6:14 PM 04/21/2022

## 2022-04-22 RX ORDER — PANTOPRAZOLE SODIUM 40 MG/1
TABLET, DELAYED RELEASE ORAL
Qty: 30 TABLET | Refills: 0 | Status: SHIPPED | OUTPATIENT
Start: 2022-04-22 | End: 2022-07-20

## 2022-05-02 ENCOUNTER — CLINICAL SUPPORT (OUTPATIENT)
Dept: REHABILITATION | Facility: HOSPITAL | Age: 57
End: 2022-05-02
Payer: COMMERCIAL

## 2022-05-02 DIAGNOSIS — M25.631 DECREASED RANGE OF MOTION OF RIGHT WRIST: ICD-10-CM

## 2022-05-02 DIAGNOSIS — R53.1 GENERALIZED WEAKNESS: ICD-10-CM

## 2022-05-02 DIAGNOSIS — R29.898 DECREASED GRIP STRENGTH OF RIGHT HAND: ICD-10-CM

## 2022-05-02 DIAGNOSIS — S46.211D TRAUMATIC RUPTURE OF RIGHT DISTAL BICEPS TENDON, SUBSEQUENT ENCOUNTER: ICD-10-CM

## 2022-05-02 PROCEDURE — 97165 OT EVAL LOW COMPLEX 30 MIN: CPT

## 2022-05-02 PROCEDURE — 97110 THERAPEUTIC EXERCISES: CPT

## 2022-05-02 NOTE — PROGRESS NOTES
Pt became very agitated and began yelling and screaming at the deputy in the room over not turning on the television. He began calling him inappropriate names, threatening to fight him and spit at him. He began pulling at the handcuffs attempting to climb out of bed. Charge nurse was brought in the room in attempt to calm the patient. Hospitalist was notified and ativan was ordered. Pt agreed to then cooperate. See OT evaluation under Plan of Care

## 2022-05-02 NOTE — PLAN OF CARE
[facility]  Initial Evaluation     Date: 5/2/2022  Name: Dimas Darden  Clinic Number: [id number]    Therapy Diagnosis:   Encounter Diagnosis   Name Primary?    Traumatic rupture of right distal biceps tendon, subsequent encounter      Physician: Julia Quintana PA    Physician Orders: OT eval and tx  Medical Diagnosis: S46.211D (ICD-10-CM) - Traumatic rupture of right distal biceps tendon, subsequent encounter  Evaluation Date: 5/2/2022  Insurance Authorization Period Expiration: 4/8/2023  Plan of Care Certification Period: 5/2/2022 to 8/2/2022    Visit # / Visits authorized: 1/12  FOTO: 1/3    Surgical Procedure: REPAIR, TENDON, BICEPS, DISTAL (Right) using Arthrex technique  Date of Surgery: 3/28/2022  Date of Return to MD: 5/11/2022    Precautions:  Standard (pt is 5 weeks post-op on 5/2/2022)    Time In: 1250  Time Out: 1330  Total Appointment Time (timed & untimed codes): 40 minutes    Subjective     Involved Side: Right  Dominant Side: Left    Date of Onset: 2/18/2022  Mechanism of Injury/ History of Current Condition: Pt reports falling at work on 2/18/2022 and rupturing his right biceps tendon. He reports having a lot of trouble with extending his R wrist and digits. He reports tightness in his biceps tendon with passive flexion stretching. He reports residual numbness in his R forearm. He reports wearing an ace wrap for compression and to remind him not to lift anything.    Per PA Notes on 4/8/2022: Post op visit 2. Pain is at worse a 2/10  Patient states he is doing okay since surgery although is still experiencing numbness tingling and loss of function  He states he is no longer taking any narcotic pain medication.  He is taking over-the-counter pain medication if needed  He has been wrapping the extremity additionally with an Ace wrap for comfort and support.  Not using any type of sling  He inquires about therapy for regain of full function which I agree  "with and will place the order for today after suture removal  He has been fully compliant with postop instructions and keeping the extremity dry    Imaging: see EPIC    Previous Therapy: n/a    Patient's Goals for Therapy: "improve the use and movement of my R wrist and hand"    Pain:  Functional Pain Scale Rating 0-10:   n/a/10 on average  n/a/10 at best  7/10 at worst  Location: RUE   Description: Aching, Shooting and Variable  Aggravating Factors: Extension and Flexing  Easing Factors: rest    Functional Limitations/Social History:    Previous Functional Status: Independent with all ADL/IADL tasks   Current Functional Status: unable to lift with RUE, no dexterity and control of R small finger, drops objects, poor grasp    Home/Living environment: lives with their spouse    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  Working presently: employed  Duties: sits at a computer        Past Medical History/Physical Systems Review:   Medical History:   Past Medical History:   Diagnosis Date    Back pain     Digestive disorder     Disc     Bulging disc in back    Diverticulitis     Hypertension        Surgical History:    has a past surgical history that includes Undescended testicle exploration (Left); Colonoscopy (N/A, 3/13/2020); Esophagogastroduodenoscopy (N/A, 4/15/2021); Laparoscopic sigmoidectomy (N/A, 6/8/2021); Injection of anesthetic agent into tissue plane defined by transversus abdominis muscle (N/A, 6/8/2021); and Mobilization of splenic flexure (N/A, 6/8/2021).    Medications:   has a current medication list which includes the following prescription(s): pantoprazole, amlodipine, ciprofloxacin hcl, methocarbamol, and tramadol, and the following Facility-Administered Medications: lactated ringers and lidocaine (pf) 10 mg/ml (1%).    Allergies:   Review of patient's allergies indicates:  No Known Allergies       Objective     Observation/Inspection:  Pt arrived with an ace wrap on his " "proximal R forearm. No elbow brace or sling - fully extending and flexing R elbow. He reported that he was told he could removed his brace at one of his first post-op visits and start moving his arm. He also reports that he was told to use a sling for 6 weeks.    Sensation: Pt denies hypersensitivity. Intact to light touch. Paresthesias reported in forearm and along ulnar nerve distribution of hand.   Scar: No tenderness to palpation. Scar is moderately thickened and raised, with mod adherence.    Edema:    (in cm) L R    5/2/2022 5/2/2022   2" above elbow Crease 34.5cm 33cm   Wrist crease 18.3cm 18cm      Active Range of Motion:    ROM (in degrees) R    5/2/2022   Shoulder flexion WNL   Shoulder abduction WNL   Shoulder internal rotation WNL   Shoulder external rotation WNL   Elbow flexion WNL   Elbow extension WNL   Pronation 74   Supination 58   Radial deviation 20   Ulnar deviation 24   Wrist flexion 50   Wrist extension 32   * per [place] Protocol for distal biceps repair: pt should not be extending elbow past 30* of extension and should still be in a long arm splint   * per protocol: elbow extension allowed to 15* at 6 weeks post-op and splint discontinued at 8 weeks       Right Hand Active Range of Motion: WNL     and Pinch Strength (in pounds, psi's):   Left Right    5/2/2022 5/2/2022    II 93 58   Lateral 23 14   Tripod 25 16   Tip 15 12       CMS Impairment/Limitation/Restriction for FOTO Upper Arm Survey    Therapist reviewed FOTO scores for Dimas Darden on 5/2/2022.   FOTO documents entered into [employer] - see Media section.    Limitation Score: 40%          Treatment     Total Treatment time (time-based codes) separate from Evaluation: 10 minutes    Dimas received therapeutic exercises for 10 minutes including:  - HEP    Home Exercise Program/Education:    Education provided:   - HEP  - scar massage    Written Home Exercises Provided: Yes  Exercises were reviewed and Dimas was able to " demonstrate them prior to the end of the session.  Dimas demonstrated good understanding of the education provided. See EMR under Patient Instructions for exercises provided during therapy sessions.     Pt was advised to perform these exercises free of pain, and to stop performing them if pain occurs.    Patient/Family Education: role of OT, goals for OT, scheduling/cancellations - pt verbalized understanding    Assessment     Dimas Darden is a 56 y.o. male referred to outpatient occupational therapy and presents with a medical diagnosis of S46.211D (ICD-10-CM) - Traumatic rupture of right distal biceps tendon, subsequent encounter. Patient presents with the following therapy deficits: decreased ROM, decreased  strength, decreased pinch strength, decreased muscle strength, decreased functional hand use, increased pain, joint Stiffness, scar adhesions, diminished/Impaired sensation and decreased coordination. He demonstrates limitations as described in the chart below.     Following medical record review, it is determined that the pt will benefit from occupational therapy services in order to maximize pain free and/or functional use of his right UE. The following goals were discussed with the patient and patient is in agreement with them as to be addressed in the treatment plan. The patient's rehab potential is Good.     Anticipated barriers to occupational therapy: delayed start to therapy post-operatively  Pt has no cultural, educational or language barriers to learning provided.    Profile and History Assessment of Occupational Performance Level of Clinical Decision Making Complexity Score   Occupational Profile:   Dimas Darden is a 56 y.o. male who lives with their spouse and is currently employed. Dimas Darden has difficulty with  driving/transportation management and housework/household chores  affecting his daily functional abilities. His main goal for therapy is to improve the use and  movement of his R hand/wrist.     Comorbidities:    has a past medical history of Back pain, Digestive disorder, Disc, Diverticulitis, and Hypertension.    Medical and Therapy History Review:   Brief               Performance Deficits    Physical:  Joint Mobility  Muscle Power/Strength  Skin Integrity/Scar Formation   Strength  Pinch Strength  Fine Motor Coordination  Proprioception Functions  Pain    Cognitive:  Safety Awareness/Insight to Disability    Psychosocial:    Habits  Routines     Clinical Decision Making:  low    Assessment Process:  Problem-Focused Assessments    Modification/Need for Assistance:  Not Necessary    Intervention Selection:  Several Treatment Options       low  Based on PMHX, co morbidities, data from assessments and functional level of assistance required with task and clinical presentation directly impacting function.       The following goals were discussed with the patient and patient is in agreement with them as to be addressed in the treatment plan.     Goals:   Short Term Goals: (4 weeks)  1. Pt will be independent with HEP in 2 visits.  2. Pt will report decreased occurrences of R elbow intermittent shooting and aching pain.  3. Pt will increase R forearm sup AROM by 8-10 degrees to enable dressing, grooming activities.  4. Pt will increase R wrist flex/ext AROM by 10 degrees to enable dressing, grooming activities.    Long Term Goals: (8 weeks)  1. Pt will report decreased pain to 1-2/10 with ADLs.   2. Pt will exhibit 70-80 degrees of R supination AROM to enable independence with self-care and meal preparation.  3. Pt will exhibit 65-75 degrees of R wrist flexion/extension AROM to enable independence with self-care and meal preparation.  4. Pt will exhibit 70-80# of R  strength to allow a firm grasp on cooking utensils, steering wheel, etc.  5. Pt will exhibit a decrease in FOTO limitation score of <40% which would indicate an improvement in quality of life.  6. 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      Plan   Plan of Care Certification: 5/2/2022 to 8/2/2022.     Outpatient Occupational Therapy 2 times weekly for 6-8 weeks to include the following interventions: Electrical Stimulation NMES, Fluidotherapy, Manual Therapy, Moist Heat/ Ice, Neuromuscular Re-ed, Paraffin, Patient Education, Self Care, Therapeutic Activities, Therapeutic Exercise and Ultrasound    ДМИТРИЙ NOBLE OT      I CERTIFY THE NEED FOR THESE SERVICES FURNISHED UNDER THIS PLAN OF TREATMENT AND WHILE UNDER MY CARE  Physician's comments:      Physician's Signature: ___________________________________________________

## 2022-05-09 ENCOUNTER — CLINICAL SUPPORT (OUTPATIENT)
Dept: REHABILITATION | Facility: HOSPITAL | Age: 57
End: 2022-05-09
Payer: COMMERCIAL

## 2022-05-09 DIAGNOSIS — M25.631 DECREASED RANGE OF MOTION OF RIGHT WRIST: Primary | ICD-10-CM

## 2022-05-09 DIAGNOSIS — R53.1 GENERALIZED WEAKNESS: ICD-10-CM

## 2022-05-09 DIAGNOSIS — R29.898 DECREASED GRIP STRENGTH OF RIGHT HAND: ICD-10-CM

## 2022-05-09 PROCEDURE — 97110 THERAPEUTIC EXERCISES: CPT

## 2022-05-09 NOTE — PROGRESS NOTES
"  Occupational Therapy Treatment Note     Date: 5/9/2022  Name: Dimas Darden  Clinic Number: [id number]    Therapy Diagnosis:   Encounter Diagnoses   Name Primary?    Decreased range of motion of right wrist Yes    Decreased  strength of right hand     Generalized weakness      Physician: Julia Quintana PA    Physician Orders: OT eval and tx  Medical Diagnosis: S46.211D (ICD-10-CM) - Traumatic rupture of right distal biceps tendon, subsequent encounter  Evaluation Date: 5/2/2022  Insurance Authorization Period Expiration: 4/8/2023  Plan of Care Certification Period: 5/2/2022 to 8/2/2022     Visit # / Visits authorized: 2/12  FOTO: 1/3     Surgical Procedure: REPAIR, TENDON, BICEPS, DISTAL (Right) using Arthrex technique  Date of Surgery: 3/28/2022  Date of Return to MD: 5/11/2022     Precautions:  Standard (pt is 6 weeks post-op on 5/9/2022)    Time In: 1145  Time Out: 1230  Total Billable Time: 45 minutes    Subjective     Pt reports: "I got a little bit more control of my hands. I still have an occasional spasm though."    he was compliant with home exercise program given on evaluation.  Response to previous treatment: good  Functional change: improved R wrist ROM    Pain: 0/10 (none on evaluation)  Location: RUE    Objective   Objective measures updated at progress report unless specified.    Treatment     Dimas received the following supervised modalities after being cleared for contradictions for 5 minutes:   - MHP to R wrist (during simultaneous US)    Dimas received the following direct contact modalities after being cleared for contraindications for 5 minutes:  - Ultrasound:  1 Mhz, 100% duty cycle, 0.9 w/cm2 to proximal forearm scar to decrease adhesions and to improve tissue extensibility    Dimas received the following manual therapy techniques for 2 minutes:   - scar massage    Dimas received therapeutic exercises for RUE for 38 minutes including:  - B wrist extension prayer " stretch  - B wrist flexion reverse prayer stretch  - R wrist extrinsic stretches (flexors and extensors) x 30 sec each  - elbow flex/ext AROM with 2# DB, 3 ways, x 15 reps each  - forearm pron/sup AROM with 1# pronator wand x 15 reps  - wrist AROM with 2# DB, 3 ways over bolster, x 20 reps each  - UD strengthening with 1# DB, FA pronated over bolster, x 10 reps  - UD strengthening (with wrist extended) with yellow theraband x 10 reps  - composite  strengthening with Progressive Hand Exerciser (3rd position) x 30 reps    Home Exercises and Education Provided     Education provided:   - updated HEP to include wrist ulnar deviation strengthening    Written Home Exercises Provided: Patient instructed to cont prior HEP  Exercises were reviewed and Dimas was able to demonstrate them prior to the end of the session. Dimas demonstrated good understanding of the HEP provided.     See EMR under Patient Instructions for exercises provided during prior visit.        Assessment     Dimas was seen for his first tx session on this date since his initial evaluation on 5/2/2022. He was compliant with his HEP and demonstrated improved wrist ROM. Noted weakness of extensor carpi ulnaris - wrist falling into flexion with UD. Able to self-correct against gravity but unable to sustain with resistance.     Dimas is progressing towards his goals and there are no updates to goals at this time. Pt prognosis is Good.     Pt will continue to benefit from skilled outpatient occupational therapy to address the deficits listed in the problem list on initial evaluation provide pt/family education and to maximize pt's level of independence in the home and community environment.     Pt's spiritual, cultural and educational needs considered and pt agreeable to plan of care and goals.    Anticipated barriers to occupational therapy: delayed start to therapy post-operatively    Goals:  Short Term Goals: (4 weeks)  1. Pt will be independent  with HEP in 2 visits. - MET 5/9/2022  2. Pt will report decreased occurrences of R elbow intermittent shooting and aching pain.  3. Pt will increase R forearm sup AROM by 8-10 degrees to enable dressing, grooming activities.  4. Pt will increase R wrist flex/ext AROM by 10 degrees to enable dressing, grooming activities.     Long Term Goals: (8 weeks)  1. Pt will report decreased pain to 1-2/10 with ADLs.   2. Pt will exhibit 70-80 degrees of R supination AROM to enable independence with self-care and meal preparation.  3. Pt will exhibit 65-75 degrees of R wrist flexion/extension AROM to enable independence with self-care and meal preparation.  4. Pt will exhibit 70-80# of R  strength to allow a firm grasp on cooking utensils, steering wheel, etc.  5. Pt will exhibit a decrease in FOTO limitation score of <40% which would indicate an improvement in quality of life.  6. 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    Plan     Plan of Care Certification: 5/2/2022 to 8/2/2022.      Outpatient Occupational Therapy 2 times weekly for 6-8 weeks to include the following interventions: Electrical Stimulation NMES, Fluidotherapy, Manual Therapy, Moist Heat/ Ice, Neuromuscular Re-ed, Paraffin, Patient Education, Self Care, Therapeutic Activities, Therapeutic Exercise and Ultrasound      ДМИТРИЙ NOBLE OT

## 2022-05-11 ENCOUNTER — HOSPITAL ENCOUNTER (OUTPATIENT)
Dept: RADIOLOGY | Facility: HOSPITAL | Age: 57
Discharge: HOME | End: 2022-05-11
Attending: ORTHOPAEDIC SURGERY
Payer: COMMERCIAL

## 2022-05-11 ENCOUNTER — OFFICE VISIT (OUTPATIENT)
Dept: ORTHOPEDICS | Facility: CLINIC | Age: 57
End: 2022-05-11
Payer: COMMERCIAL

## 2022-05-11 VITALS — HEIGHT: 74 IN | BODY MASS INDEX: 30.92 KG/M2 | WEIGHT: 240.94 LBS

## 2022-05-11 DIAGNOSIS — M25.521 RIGHT ELBOW PAIN: ICD-10-CM

## 2022-05-11 DIAGNOSIS — S46.211D TRAUMATIC RUPTURE OF RIGHT DISTAL BICEPS TENDON, SUBSEQUENT ENCOUNTER: Primary | ICD-10-CM

## 2022-05-11 DIAGNOSIS — M25.521 RIGHT ELBOW PAIN: Primary | ICD-10-CM

## 2022-05-11 PROCEDURE — 3008F BODY MASS INDEX DOCD: CPT | Mod: CPTII,S$GLB,, | Performed by: ORTHOPAEDIC SURGERY

## 2022-05-11 PROCEDURE — 73080 XR ELBOW COMPLETE 3 VIEW RIGHT: ICD-10-PCS | Mod: 26,RT,, | Performed by: RADIOLOGY

## 2022-05-11 PROCEDURE — 99024 PR POST-OP FOLLOW-UP VISIT: ICD-10-PCS | Mod: S$GLB,,, | Performed by: ORTHOPAEDIC SURGERY

## 2022-05-11 PROCEDURE — 99999 PR PBB SHADOW E&M-EST. PATIENT-LVL III: ICD-10-PCS | Mod: PBBFAC,,, | Performed by: ORTHOPAEDIC SURGERY

## 2022-05-11 PROCEDURE — 99999 PR PBB SHADOW E&M-EST. PATIENT-LVL III: CPT | Mod: PBBFAC,,, | Performed by: ORTHOPAEDIC SURGERY

## 2022-05-11 PROCEDURE — 3008F PR BODY MASS INDEX (BMI) DOCUMENTED: ICD-10-PCS | Mod: CPTII,S$GLB,, | Performed by: ORTHOPAEDIC SURGERY

## 2022-05-11 PROCEDURE — 1160F RVW MEDS BY RX/DR IN RCRD: CPT | Mod: CPTII,S$GLB,, | Performed by: ORTHOPAEDIC SURGERY

## 2022-05-11 PROCEDURE — 1159F MED LIST DOCD IN RCRD: CPT | Mod: CPTII,S$GLB,, | Performed by: ORTHOPAEDIC SURGERY

## 2022-05-11 PROCEDURE — 73080 X-RAY EXAM OF ELBOW: CPT | Mod: TC,RT

## 2022-05-11 PROCEDURE — 73080 X-RAY EXAM OF ELBOW: CPT | Mod: 26,RT,, | Performed by: RADIOLOGY

## 2022-05-11 PROCEDURE — 1159F PR MEDICATION LIST DOCUMENTED IN MEDICAL RECORD: ICD-10-PCS | Mod: CPTII,S$GLB,, | Performed by: ORTHOPAEDIC SURGERY

## 2022-05-11 PROCEDURE — 99024 POSTOP FOLLOW-UP VISIT: CPT | Mod: S$GLB,,, | Performed by: ORTHOPAEDIC SURGERY

## 2022-05-11 PROCEDURE — 1160F PR REVIEW ALL MEDS BY PRESCRIBER/CLIN PHARMACIST DOCUMENTED: ICD-10-PCS | Mod: CPTII,S$GLB,, | Performed by: ORTHOPAEDIC SURGERY

## 2022-05-11 NOTE — PROGRESS NOTES
Subjective:     Patient ID: Dimas Darden is a 56 y.o. male.    Chief Complaint: Post-op Evaluation of the Right Upper Arm      HPI:  The patient is a 56-year-old male status post right distal biceps tendon rupture repair using Arthrex technique with interference screw and Endobutton date of surgery was 03/28/2022.  He seems to be doing well.  He has been to physical therapy.  He has cut the grass in changed his oil apparently without difficulty.    Past Medical History:   Diagnosis Date    Back pain     Digestive disorder     Disc     Bulging disc in back    Diverticulitis     Hypertension      Past Surgical History:   Procedure Laterality Date    COLONOSCOPY N/A 3/13/2020    Procedure: COLONOSCOPY;  Surgeon: Mat Guerrero MD;  Location: [facility] ENDO;  Service: General;  Laterality: N/A;    ESOPHAGOGASTRODUODENOSCOPY N/A 4/15/2021    Procedure: EGD (ESOPHAGOGASTRODUODENOSCOPY) needs rapid COVID;  Surgeon: Josie Livingston MD;  Location: [facility] ENDO;  Service: Endoscopy;  Laterality: N/A;    INJECTION OF ANESTHETIC AGENT INTO TISSUE PLANE DEFINED BY TRANSVERSUS ABDOMINIS MUSCLE N/A 6/8/2021    Procedure: BLOCK, TRANSVERSUS ABDOMINIS PLANE;  Surgeon: Mat Guerrero MD;  Location: [facility] OR;  Service: General;  Laterality: N/A;    LAPAROSCOPIC SIGMOIDECTOMY N/A 6/8/2021    Procedure: COLECTOMY, SIGMOID, LAPAROSCOPIC;  Surgeon: Mat Guerrero MD;  Location: [facility] OR;  Service: General;  Laterality: N/A;    MOBILIZATION OF SPLENIC FLEXURE N/A 6/8/2021    Procedure: MOBILIZATION, SPLENIC FLEXURE;  Surgeon: Mat Guerrero MD;  Location: [facility] OR;  Service: General;  Laterality: N/A;  Laparoscopic    UNDESCENDED TESTICLE EXPLORATION Left     Age 15     Family History   Problem Relation Age of Onset    Diverticulitis Mother     No Known Problems Father     No Known Problems Sister     No Known Problems Brother     No Known Problems Brother      Social History     Socioeconomic History     Marital status:    Tobacco Use    Smoking status: Former Smoker     Packs/day: 1.00     Years: 25.00     Pack years: 25.00     Quit date: 4/10/2012     Years since quitting: 10.0    Smokeless tobacco: Never Used   Substance and Sexual Activity    Alcohol use: Yes     Alcohol/week: 2.0 standard drinks     Types: 2 Glasses of wine per week     Comment: Socially on the weekend    Drug use: No     Social Determinants of Health     Financial Resource Strain: Medium Risk    Difficulty of Paying Living Expenses: Somewhat hard   Food Insecurity: No Food Insecurity    Worried About Running Out of Food in the Last Year: Never true    Ran Out of Food in the Last Year: Never true   Transportation Needs: No Transportation Needs    Lack of Transportation (Medical): No    Lack of Transportation (Non-Medical): No   Physical Activity: Insufficiently Active    Days of Exercise per Week: 3 days    Minutes of Exercise per Session: 30 min   Stress: No Stress Concern Present    Feeling of Stress : Not at all   Social Connections: Unknown    Frequency of Communication with Friends and Family: Twice a week    Frequency of Social Gatherings with Friends and Family: Once a week    Attends Club or Organization Meetings: Never    Marital Status:    Housing Stability: Low Risk     Unable to Pay for Housing in the Last Year: No    Number of Places Lived in the Last Year: 1    Unstable Housing in the Last Year: No     Medication List with Changes/Refills   Current Medications    AMLODIPINE (NORVASC) 5 MG TABLET    Take 1 tablet (5 mg total) by mouth once daily.    CIPROFLOXACIN HCL (CIPRO) 500 MG TABLET    Take 1 tablet (500 mg total) by mouth 2 (two) times daily.    METHOCARBAMOL (ROBAXIN) 500 MG TAB    TAKE 1 TABLET BY MOUTH 4 TIMES DAILY FOR 10 DAYS    PANTOPRAZOLE (PROTONIX) 40 MG TABLET    Take 1 tablet by mouth once daily    TRAMADOL (ULTRAM) 50 MG TABLET    Take 1 tablet (50 mg total) by mouth every 8  (eight) hours as needed.     Review of patient's allergies indicates:  No Known Allergies  Review of Systems   Constitutional: Negative for malaise/fatigue.   HENT: Negative for hearing loss.    Eyes: Negative for double vision and visual disturbance.   Cardiovascular: Negative for chest pain.   Respiratory: Negative for shortness of breath.    Endocrine: Negative for cold intolerance.   Hematologic/Lymphatic: Does not bruise/bleed easily.   Skin: Negative for poor wound healing and suspicious lesions.   Musculoskeletal: Negative for gout, joint pain and joint swelling.   Gastrointestinal: Positive for abdominal pain and heartburn. Negative for nausea and vomiting.   Genitourinary: Negative for dysuria.   Neurological: Negative for numbness, paresthesias and sensory change.   Psychiatric/Behavioral: Negative for depression, memory loss and substance abuse. The patient is not nervous/anxious.    Allergic/Immunologic: Negative for persistent infections.       Objective:   Body mass index is 30.94 kg/m².  There were no vitals filed for this visit.             General    Constitutional: He is oriented to person, place, and time. He appears well-developed and well-nourished. No distress.   HENT:   Head: Normocephalic.   Eyes: EOM are normal.   Pulmonary/Chest: Effort normal.   Neurological: He is oriented to person, place, and time.   Psychiatric: He has a normal mood and affect.             Right Hand/Wrist Exam     Other     Neuorologic Exam    Median Distribution: normal  Ulnar Distribution: normal  Radial Distribution: normal      Right Elbow Exam     Inspection   Scars: present  Effusion: present    Other   Sensation: normal    Comments:  The patient has transverse scar right proximal volar forearm.  There is no sign of infection.  There are no motor or sensory deficits.          Vascular Exam       Capillary Refill  Right Hand: normal capillary refill            Relevant imaging results reviewed and interpreted by  me, discussed with the patient and / or family today re-x-ray of the right elbow showed the Endobutton in satisfactory position unchanged from intraoperative films  Assessment:     Encounter Diagnosis   Name Primary?    Traumatic rupture of right distal biceps tendon, subsequent encounter Yes        Plan:     The patient seems to be doing well.  He is regain full motion.  He seems pleased with the results.  He will return on a as needed basis.                Disclaimer: This note was prepared using a voice recognition system and is likely to have sound alike errors within the text.

## 2022-05-13 ENCOUNTER — CLINICAL SUPPORT (OUTPATIENT)
Dept: REHABILITATION | Facility: HOSPITAL | Age: 57
End: 2022-05-13
Payer: COMMERCIAL

## 2022-05-13 DIAGNOSIS — M25.631 DECREASED RANGE OF MOTION OF RIGHT WRIST: Primary | ICD-10-CM

## 2022-05-13 DIAGNOSIS — R53.1 GENERALIZED WEAKNESS: ICD-10-CM

## 2022-05-13 DIAGNOSIS — R29.898 DECREASED GRIP STRENGTH OF RIGHT HAND: ICD-10-CM

## 2022-05-13 PROCEDURE — 97140 MANUAL THERAPY 1/> REGIONS: CPT

## 2022-05-13 PROCEDURE — 97110 THERAPEUTIC EXERCISES: CPT

## 2022-05-13 NOTE — PROGRESS NOTES
"  Occupational Therapy Treatment Note     Date: 5/13/2022  Name: Dimas Darden  Clinic Number: [id number]    Therapy Diagnosis:   Encounter Diagnoses   Name Primary?    Decreased range of motion of right wrist Yes    Decreased  strength of right hand     Generalized weakness      Physician: Julia Quintana PA    Physician Orders: OT eval and tx  Medical Diagnosis: S46.211D (ICD-10-CM) - Traumatic rupture of right distal biceps tendon, subsequent encounter  Evaluation Date: 5/2/2022  Insurance Authorization Period Expiration: 4/8/2023  Plan of Care Certification Period: 5/2/2022 to 8/2/2022     Visit # / Visits authorized: 3/12  FOTO: 1/3     Surgical Procedure: REPAIR, TENDON, BICEPS, DISTAL (Right) using Arthrex technique  Date of Surgery: 3/28/2022  Date of Return to MD: 5/11/2022     Precautions:  Standard (pt was 6 weeks post-op on 5/9/2022)    Time In: 1045  Time Out: 1130  Total Billable Time: 45 minutes    Subjective     Pt reports: "My arm is fine. It just feels tight in my forearm. Less spasms and some of the numbness has gone away. Still not full control - can tell when I grab things."    he was compliant with home exercise program given on evaluation.  Response to previous treatment: good  Functional change: decreased spasms    Pain: 0/10 (none on evaluation)  Location: [facility]    Objective   Objective measures updated at progress report unless specified.    Treatment     Dimas received the following supervised modalities after being cleared for contradictions for 6 minutes:   - MHP to R wrist (during simultaneous US)    Dimas received the following direct contact modalities after being cleared for contraindications for 6 minutes:  - Ultrasound:  1 Mhz, 100% duty cycle, 0.9 w/cm2 to proximal forearm scar to decrease adhesions and to improve tissue extensibility    Dimas received the following manual therapy techniques for RUE for 10 minutes:   - scar massage to decrease adhesions  - IASTM " to dorsal surface of R forearm to increase circulation and tissue extensibility    Dimas received therapeutic exercises for RUE for 29 minutes including:  - R wrist extrinsic stretches (flexors, extensors, and crossbody) x 30 sec each  - elbow flex/ext AROM with 3# DB, 3 ways, x 15 reps each  - forearm pron/sup AROM with 1# pronator wand x 20 reps  - wrist AROM with 2# DB, 3 ways over bolster, x 20 reps each  - UD with wrist extension AROM (FA pronated) over bolster, x 20 reps  - UD strengthening (with wrist extended) with yellow theraband x 10 reps (NT)  - composite  strengthening with Progressive Hand Exerciser (3rd position) x 30 reps    Home Exercises and Education Provided     Education provided:   - reviewed updated HEP of wrist ulnar deviation strengthening    Written Home Exercises Provided: Patient instructed to cont prior HEP  Exercises were reviewed and Dimas was able to demonstrate them prior to the end of the session. Dimas demonstrated good understanding of the HEP provided.     See EMR under Patient Instructions for exercises provided during prior visit.        Assessment     Dimas was seen for his 2nd tx session on this date. He tolerated a slight increase in reps and resistance with no complaints of pain. He continues to report tightness in his R forearm and weakness with ulnar deviation and wrist extension. He has been compliant with his HEP.     Dimas is progressing towards his goals and there are no updates to goals at this time. Pt prognosis is Good.     Pt will continue to benefit from skilled outpatient occupational therapy to address the deficits listed in the problem list on initial evaluation provide pt/family education and to maximize pt's level of independence in the home and community environment.     Pt's spiritual, cultural and educational needs considered and pt agreeable to plan of care and goals.    Anticipated barriers to occupational therapy: delayed start to therapy  post-operatively    Goals:  Short Term Goals: (4 weeks)  1. Pt will be independent with HEP in 2 visits. - MET 5/9/2022  2. Pt will report decreased occurrences of R elbow intermittent shooting and aching pain. - MET 5/13/2022  3. Pt will increase R forearm sup AROM by 8-10 degrees to enable dressing, grooming activities.  4. Pt will increase R wrist flex/ext AROM by 10 degrees to enable dressing, grooming activities.     Long Term Goals: (8 weeks)  1. Pt will report decreased pain to 1-2/10 with ADLs.   2. Pt will exhibit 70-80 degrees of R supination AROM to enable independence with self-care and meal preparation.  3. Pt will exhibit 65-75 degrees of R wrist flexion/extension AROM to enable independence with self-care and meal preparation.  4. Pt will exhibit 70-80# of R  strength to allow a firm grasp on cooking utensils, steering wheel, etc.  5. Pt will exhibit a decrease in FOTO limitation score of <40% which would indicate an improvement in quality of life.  6. 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    Plan     Plan of Care Certification: 5/2/2022 to 8/2/2022.      Outpatient Occupational Therapy 2 times weekly for 6-8 weeks to include the following interventions: Electrical Stimulation NMES, Fluidotherapy, Manual Therapy, Moist Heat/ Ice, Neuromuscular Re-ed, Paraffin, Patient Education, Self Care, Therapeutic Activities, Therapeutic Exercise and Ultrasound      ДМИТРИЙ NOBLE OT

## 2022-05-16 ENCOUNTER — CLINICAL SUPPORT (OUTPATIENT)
Dept: REHABILITATION | Facility: HOSPITAL | Age: 57
End: 2022-05-16
Payer: COMMERCIAL

## 2022-05-16 DIAGNOSIS — R53.1 GENERALIZED WEAKNESS: ICD-10-CM

## 2022-05-16 DIAGNOSIS — M25.631 DECREASED RANGE OF MOTION OF RIGHT WRIST: Primary | ICD-10-CM

## 2022-05-16 DIAGNOSIS — R29.898 DECREASED GRIP STRENGTH OF RIGHT HAND: ICD-10-CM

## 2022-05-16 PROCEDURE — 97140 MANUAL THERAPY 1/> REGIONS: CPT

## 2022-05-16 PROCEDURE — 97110 THERAPEUTIC EXERCISES: CPT

## 2022-05-16 NOTE — PROGRESS NOTES
"  Occupational Therapy Treatment Note     Date: 5/16/2022  Name: Dimas Darden  Clinic Number: [id number]    Therapy Diagnosis:   Encounter Diagnoses   Name Primary?    Decreased range of motion of right wrist Yes    Decreased  strength of right hand     Generalized weakness      Physician: Julia Quintana, PA    Physician Orders: OT eval and tx  Medical Diagnosis: S46.211D (ICD-10-CM) - Traumatic rupture of right distal biceps tendon, subsequent encounter  Evaluation Date: 5/2/2022  Insurance Authorization Period Expiration: 4/8/2023  Plan of Care Certification Period: 5/2/2022 to 8/2/2022     Visit # / Visits authorized: 4/12  FOTO: 1/3     Surgical Procedure: REPAIR, TENDON, BICEPS, DISTAL (Right) using Arthrex technique  Date of Surgery: 3/28/2022  Date of Return to MD: 5/11/2022     Precautions:  Standard (pt was 6 weeks post-op on 5/9/2022)    Time In: 1145  Time Out: 1230  Total Billable Time: 45 minutes    Subjective     Pt reports: "I don't really have any more shooting pain or spasms, just the occasional pain around my scar."    he was compliant with home exercise program given on evaluation.  Response to previous treatment: good  Functional change: improved tolerance for UD ROM    Pain: 0/10 (none on evaluation)  Location: [facility]    Objective   Objective measures updated at progress report unless specified.    Treatment     Dimas received the following supervised modalities after being cleared for contradictions for 4 minutes:   - MHP to R wrist (during simultaneous US)    Dimas received the following direct contact modalities after being cleared for contraindications for 4 minutes:  - Ultrasound:  1 Mhz, 100% duty cycle, 0.9 w/cm2 to proximal forearm scar to decrease adhesions and to improve tissue extensibility    Dimas received the following manual therapy techniques for RUE for 10 minutes:   - scar massage to decrease adhesions  - IASTM to dorsal surface of R forearm to increase " circulation and tissue extensibility  - FA and wrist mobs  - STM to intrinsics to decrease tightness    Dimas received therapeutic exercises for RUE for 31 minutes including:  - R wrist extrinsic stretches (flexors, extensors, and crossbody) x 30 sec each  - elbow flex/ext AROM with 3# DB, 3 ways, x 15 reps each  - forearm pron/sup AROM with 1# pronator wand x 20 reps  - wrist AROM with 2# DB, 3 ways over bolster, x 20 reps each  - UD with wrist extension AROM (FA pronated) over bolster, x 20 reps  - UD slides on tabletop with pillowcase and 2# weight on dorsal surface of hand x 20 reps  - UD strengthening (with wrist extended) with yellow theraband x 10 reps (NT)  - composite  strengthening with Progressive Hand Exerciser (3rd position) x 30 reps    Home Exercises and Education Provided     Education provided:   - reviewed updated HEP of wrist ulnar deviation strengthening    Written Home Exercises Provided: Patient instructed to cont prior HEP  Exercises were reviewed and Dimas was able to demonstrate them prior to the end of the session. Dimas demonstrated good understanding of the HEP provided.     See EMR under Patient Instructions for exercises provided during prior visit.        Assessment     Dimas was seen for his 4th tx session on this date. He tolerated UD ROM better on this date and is reporting decreased overall spasms and pain.    Dimas is progressing towards his goals and there are no updates to goals at this time. Pt prognosis is Good.     Pt will continue to benefit from skilled outpatient occupational therapy to address the deficits listed in the problem list on initial evaluation provide pt/family education and to maximize pt's level of independence in the home and community environment.     Pt's spiritual, cultural and educational needs considered and pt agreeable to plan of care and goals.    Anticipated barriers to occupational therapy: delayed start to therapy  post-operatively    Goals:  Short Term Goals: (4 weeks)  1. Pt will be independent with HEP in 2 visits. - MET 5/9/2022  2. Pt will report decreased occurrences of R elbow intermittent shooting and aching pain. - MET 5/13/2022  3. Pt will increase R forearm sup AROM by 8-10 degrees to enable dressing, grooming activities.  4. Pt will increase R wrist flex/ext AROM by 10 degrees to enable dressing, grooming activities.     Long Term Goals: (8 weeks)  1. Pt will report decreased pain to 1-2/10 with ADLs.   2. Pt will exhibit 70-80 degrees of R supination AROM to enable independence with self-care and meal preparation.  3. Pt will exhibit 65-75 degrees of R wrist flexion/extension AROM to enable independence with self-care and meal preparation.  4. Pt will exhibit 70-80# of R  strength to allow a firm grasp on cooking utensils, steering wheel, etc.  5. Pt will exhibit a decrease in FOTO limitation score of <40% which would indicate an improvement in quality of life.  6. 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    Plan     Plan of Care Certification: 5/2/2022 to 8/2/2022.      Outpatient Occupational Therapy 2 times weekly for 6-8 weeks to include the following interventions: Electrical Stimulation NMES, Fluidotherapy, Manual Therapy, Moist Heat/ Ice, Neuromuscular Re-ed, Paraffin, Patient Education, Self Care, Therapeutic Activities, Therapeutic Exercise and Ultrasound      ДМИТРИЙ NOBLE OT

## 2022-05-18 ENCOUNTER — CLINICAL SUPPORT (OUTPATIENT)
Dept: REHABILITATION | Facility: HOSPITAL | Age: 57
End: 2022-05-18
Payer: COMMERCIAL

## 2022-05-18 DIAGNOSIS — M25.631 DECREASED RANGE OF MOTION OF RIGHT WRIST: Primary | ICD-10-CM

## 2022-05-18 DIAGNOSIS — R53.1 GENERALIZED WEAKNESS: ICD-10-CM

## 2022-05-18 DIAGNOSIS — R29.898 DECREASED GRIP STRENGTH OF RIGHT HAND: ICD-10-CM

## 2022-05-18 PROCEDURE — 97140 MANUAL THERAPY 1/> REGIONS: CPT

## 2022-05-18 PROCEDURE — 97110 THERAPEUTIC EXERCISES: CPT

## 2022-05-18 NOTE — PROGRESS NOTES
"  Occupational Therapy Treatment Note     Date: 5/18/2022  Name: Dimas Darden  Clinic Number: [id number]    Therapy Diagnosis:   Encounter Diagnoses   Name Primary?    Decreased range of motion of right wrist Yes    Decreased  strength of right hand     Generalized weakness      Physician: Julia Quintana PA    Physician Orders: OT eval and tx  Medical Diagnosis: S46.211D (ICD-10-CM) - Traumatic rupture of right distal biceps tendon, subsequent encounter  Evaluation Date: 5/2/2022  Insurance Authorization Period Expiration: 4/8/2023  Plan of Care Certification Period: 5/2/2022 to 8/2/2022     Visit # / Visits authorized: 5/12  FOTO: 1/3     Surgical Procedure: REPAIR, TENDON, BICEPS, DISTAL (Right) using Arthrex technique  Date of Surgery: 3/28/2022  Date of Return to MD: 5/11/2022     Precautions:  Standard (pt was 7 weeks post-op on 5/16/2022)    Time In: 1255  Time Out: 1340  Total Billable Time: 45 minutes    Subjective     Pt reports: "It doesn't hurt anymore to move my wrist like this."    he was compliant with home exercise program given on evaluation.  Response to previous treatment: good  Functional change: decreased pain with movement    Pain: 0/10 (none on evaluation)  Location: [facility]    Objective   Objective measures updated at progress report unless specified.       CMS Impairment/Limitation/Restriction for FOTO Upper Arm Survey     Therapist reviewed FOTO scores for Dimas Darden on 5/18/2022.   FOTO documents entered into [employer] - see Media section.     Limitation Score: 35% (40% on evaluation on 5/2/2022)           Treatment     Dimas received the following supervised modalities after being cleared for contradictions for 8 minutes:   - MHP to R wrist (during simultaneous US)    Dimas received the following direct contact modalities after being cleared for contraindications for 8 minutes:  - Ultrasound:  1 Mhz, 100% duty cycle, 1.0 w/cm2 to proximal forearm scar to decrease " Attending Physician: No att. providers found    Review Type: ADMITTING ORDER Reviewer: Boom Rick     Date: January 16, 2017 - 11:29 AM  Payor: [address] [place] [address] Number: obs stay  Admit date: 1/13/2017  1:13 AM        REVIEWER COMMENTS adhesions and to improve tissue extensibility    Dimas received the following manual therapy techniques for [facility] for 10 minutes:   - scar massage to decrease adhesions  - IASTM to dorsal surface of R forearm to increase circulation and tissue extensibility  - FA and wrist mobs  - STM to intrinsics to decrease tightness    Dimas received therapeutic exercises for RUE for 27 minutes including:  - R wrist extrinsic stretches (flexors, extensors, and crossbody) x 30 sec each  - elbow flex/ext AROM with 3# DB, 3 ways, x 15 reps each  - forearm pron/sup AROM with 1# pronator wand x 20 reps  - wrist AROM with 2# DB, 3 ways over bolster, x 20 reps each  - UD with wrist extension AROM (FA pronated) over bolster, x 20 reps  - UD slides on tabletop with pillowcase and 3# weight on dorsal surface of hand x 20 reps   - UD strengthening (with wrist extended) with 1# DB x 10 reps   - composite  strengthening with Progressive Hand Exerciser (3rd position) x 30 reps    Home Exercises and Education Provided     Education provided:   - progress toward     Written Home Exercises Provided: Patient instructed to cont prior HEP  Exercises were reviewed and Dimas was able to demonstrate them prior to the end of the session. Dimas demonstrated good understanding of the HEP provided.     See EMR under Patient Instructions for exercises provided during prior visit.        Assessment     Dimas was seen for his 5th tx session on this date. He is reporting decreased overall pain and improved tolerance for strengthening exercises.    Dimas is progressing towards his goals and there are no updates to goals at this time. Pt prognosis is Good.     Pt will continue to benefit from skilled outpatient occupational therapy to address the deficits listed in the problem list on initial evaluation provide pt/family education and to maximize pt's level of independence in the home and community environment.     Pt's spiritual, cultural and  educational needs considered and pt agreeable to plan of care and goals.    Anticipated barriers to occupational therapy: delayed start to therapy post-operatively    Goals:  Short Term Goals: (4 weeks)  1. Pt will be independent with HEP in 2 visits. - MET 5/9/2022  2. Pt will report decreased occurrences of R elbow intermittent shooting and aching pain. - MET 5/13/2022  3. Pt will increase R forearm sup AROM by 8-10 degrees to enable dressing, grooming activities.  4. Pt will increase R wrist flex/ext AROM by 10 degrees to enable dressing, grooming activities.     Long Term Goals: (8 weeks)  1. Pt will report decreased pain to 1-2/10 with ADLs. - MET 5/18/2022  2. Pt will exhibit 70-80 degrees of R supination AROM to enable independence with self-care and meal preparation.  3. Pt will exhibit 65-75 degrees of R wrist flexion/extension AROM to enable independence with self-care and meal preparation.  4. Pt will exhibit 70-80# of R  strength to allow a firm grasp on cooking utensils, steering wheel, etc.  5. Pt will exhibit a decrease in FOTO limitation score of <40% which would indicate an improvement in quality of life. - MET 5/18/2022  6. 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    Plan     Plan of Care Certification: 5/2/2022 to 8/2/2022.      Outpatient Occupational Therapy 2 times weekly for 6-8 weeks to include the following interventions: Electrical Stimulation NMES, Fluidotherapy, Manual Therapy, Moist Heat/ Ice, Neuromuscular Re-ed, Paraffin, Patient Education, Self Care, Therapeutic Activities, Therapeutic Exercise and Ultrasound      ДМИТРИЙ NOBLE, OT

## 2022-05-23 ENCOUNTER — CLINICAL SUPPORT (OUTPATIENT)
Dept: REHABILITATION | Facility: HOSPITAL | Age: 57
End: 2022-05-23
Payer: COMMERCIAL

## 2022-05-23 DIAGNOSIS — R53.1 GENERALIZED WEAKNESS: ICD-10-CM

## 2022-05-23 DIAGNOSIS — M25.631 DECREASED RANGE OF MOTION OF RIGHT WRIST: Primary | ICD-10-CM

## 2022-05-23 DIAGNOSIS — R29.898 DECREASED GRIP STRENGTH OF RIGHT HAND: ICD-10-CM

## 2022-05-23 PROCEDURE — 97110 THERAPEUTIC EXERCISES: CPT

## 2022-05-23 PROCEDURE — 97140 MANUAL THERAPY 1/> REGIONS: CPT

## 2022-05-23 NOTE — PROGRESS NOTES
"  Occupational Therapy Treatment Note     Date: 5/23/2022  Name: Dimas Darden  Clinic Number: [id number]    Therapy Diagnosis:   Encounter Diagnoses   Name Primary?    Decreased range of motion of right wrist Yes    Decreased  strength of right hand     Generalized weakness      Physician: Julia Quintana, PA    Physician Orders: OT eval and tx  Medical Diagnosis: S46.211D (ICD-10-CM) - Traumatic rupture of right distal biceps tendon, subsequent encounter  Evaluation Date: 5/2/2022  Insurance Authorization Period Expiration: 4/8/2023  Plan of Care Certification Period: 5/2/2022 to 8/2/2022     Visit # / Visits authorized: 6/12  FOTO: 1/3     Surgical Procedure: REPAIR, TENDON, BICEPS, DISTAL (Right) using Arthrex technique  Date of Surgery: 3/28/2022  Date of Return to MD: 5/11/2022     Precautions:  Standard (pt was 7 weeks post-op on 5/16/2022)    Time In: 1145  Time Out: 1230  Total Billable Time: 45 minutes    Subjective     Pt reports: "I think my wrist is getting better but I still can't scratch my ear with my small finger."    he was compliant with home exercise program given on evaluation.  Response to previous treatment: good  Functional change: improved tolerance for right upper extremity strengthening    Pain: 0/10 (none on evaluation)  Location: [facility]    Objective   Objective measures updated at progress report unless specified.       CMS Impairment/Limitation/Restriction for FOTO Upper Arm Survey     Therapist reviewed FOTO scores for Dimas Darden on 5/18/2022.   FOTO documents entered into [employer] - see Media section.     Limitation Score: 35% (40% on evaluation on 5/2/2022)           Treatment     Dimas received the following supervised modalities after being cleared for contradictions for 8 minutes:   - MHP to R wrist (during simultaneous US)    Dimas received the following direct contact modalities after being cleared for contraindications for 8 minutes:  - Ultrasound:  1 Mhz, " 100% duty cycle, 1.0 w/cm2 to proximal forearm scar to decrease adhesions and to improve tissue extensibility    Dimas received the following manual therapy techniques for RUE for 10 minutes:   - scar massage to decrease adhesions  - IASTM to dorsal surface of R forearm to increase circulation and tissue extensibility  - FA and wrist mobs  - STM to intrinsics to decrease tightness (not today)    Dimas received therapeutic exercises for RUE for 27 minutes including:  - R wrist extrinsic stretches (flexors, extensors, and crossbody) x 30 sec each  - elbow flex/ext AROM with 3# DB, 3 ways, x 20 reps each  - forearm pron/sup AROM with 1# pronator wand x 20 reps  - wrist AROM with 2# DB, 3 ways over bolster, x 20 reps each  - UD with wrist extension AROM with 1# DB (FA pronated) over bolster, x 20 reps  - UD slides on tabletop with pillowcase and 3# weight on dorsal surface of hand x 20 reps   - UD strengthening (with wrist extended) with 1# DB x 10 reps   - yellow putty pulls with small finger x 15 reps  - composite  strengthening with Progressive Hand Exerciser (3rd position) x 30 reps (not today)    Home Exercises and Education Provided     Education provided:   - progress toward   - issued yellow putty for home use for small finger strengthening    Written Home Exercises Provided: Patient instructed to cont prior HEP  Exercises were reviewed and Dimas was able to demonstrate them prior to the end of the session. Dimas demonstrated good understanding of the HEP provided.     See EMR under Patient Instructions for exercises provided during prior visit.        Assessment     Dimas was seen for his 6th tx session on this date. He tolerated an increase in reps with elbow strengthening exercises but continues to be limited by weakness with wrist extension and ulnar deviation. Christopher OLVERA regarding concerns.    Dimas is progressing towards his goals and there are no updates to goals at this time. Pt  prognosis is Good.     Pt will continue to benefit from skilled outpatient occupational therapy to address the deficits listed in the problem list on initial evaluation provide pt/family education and to maximize pt's level of independence in the home and community environment.     Pt's spiritual, cultural and educational needs considered and pt agreeable to plan of care and goals.    Anticipated barriers to occupational therapy: delayed start to therapy post-operatively    Goals:  Short Term Goals: (4 weeks)  1. Pt will be independent with HEP in 2 visits. - MET 5/9/2022  2. Pt will report decreased occurrences of R elbow intermittent shooting and aching pain. - MET 5/13/2022  3. Pt will increase R forearm sup AROM by 8-10 degrees to enable dressing, grooming activities.  4. Pt will increase R wrist flex/ext AROM by 10 degrees to enable dressing, grooming activities.     Long Term Goals: (8 weeks)  1. Pt will report decreased pain to 1-2/10 with ADLs. - MET 5/18/2022  2. Pt will exhibit 70-80 degrees of R supination AROM to enable independence with self-care and meal preparation.  3. Pt will exhibit 65-75 degrees of R wrist flexion/extension AROM to enable independence with self-care and meal preparation.  4. Pt will exhibit 70-80# of R  strength to allow a firm grasp on cooking utensils, steering wheel, etc.  5. Pt will exhibit a decrease in FOTO limitation score of <40% which would indicate an improvement in quality of life. - MET 5/18/2022  6. 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    Plan     Plan of Care Certification: 5/2/2022 to 8/2/2022.      Outpatient Occupational Therapy 2 times weekly for 6-8 weeks to include the following interventions: Electrical Stimulation NMES, Fluidotherapy, Manual Therapy, Moist Heat/ Ice, Neuromuscular Re-ed, Paraffin, Patient Education, Self Care, Therapeutic Activities, Therapeutic Exercise and Ultrasound      ДМИТРИЙ NOBLE OT

## 2022-05-25 ENCOUNTER — CLINICAL SUPPORT (OUTPATIENT)
Dept: REHABILITATION | Facility: HOSPITAL | Age: 57
End: 2022-05-25
Payer: COMMERCIAL

## 2022-05-25 DIAGNOSIS — R29.898 DECREASED GRIP STRENGTH OF RIGHT HAND: ICD-10-CM

## 2022-05-25 DIAGNOSIS — M25.631 DECREASED RANGE OF MOTION OF RIGHT WRIST: Primary | ICD-10-CM

## 2022-05-25 DIAGNOSIS — R53.1 GENERALIZED WEAKNESS: ICD-10-CM

## 2022-05-25 PROCEDURE — 97140 MANUAL THERAPY 1/> REGIONS: CPT

## 2022-05-25 PROCEDURE — 97110 THERAPEUTIC EXERCISES: CPT

## 2022-05-25 PROCEDURE — 97035 APP MDLTY 1+ULTRASOUND EA 15: CPT

## 2022-05-25 NOTE — PROGRESS NOTES
"  Occupational Therapy Treatment Note     Date: 5/25/2022  Name: Dimas Darden  Clinic Number: [id number]    Therapy Diagnosis:   Encounter Diagnoses   Name Primary?    Decreased range of motion of right wrist Yes    Decreased  strength of right hand     Generalized weakness      Physician: Julia Quintana PA    Physician Orders: OT eval and tx  Medical Diagnosis: S46.211D (ICD-10-CM) - Traumatic rupture of right distal biceps tendon, subsequent encounter  Evaluation Date: 5/2/2022  Insurance Authorization Period Expiration: 4/8/2023  Plan of Care Certification Period: 5/2/2022 to 8/2/2022     Visit # / Visits authorized: 7/12  FOTO: 1/3     Surgical Procedure: REPAIR, TENDON, BICEPS, DISTAL (Right) using Arthrex technique  Date of Surgery: 3/28/2022  Date of Return to MD: 5/11/2022     Precautions:  Standard (pt was 7 weeks post-op on 5/16/2022)    Time In: 1255  Time Out: 1340  Total Billable Time: 45 minutes    Subjective     Pt reports: "I'm still having some tightness and tingling in my forearm but my elbow seems to be doing better. My wrist is the biggest problem though."    he was compliant with home exercise program given on evaluation.  Response to previous treatment: good  Functional change: improved tolerance for right upper extremity strengthening    Pain: 0/10 (none on evaluation)  Location: [facility]    Objective   Objective measures updated at progress report unless specified.       CMS Impairment/Limitation/Restriction for FOTO Upper Arm Survey     Therapist reviewed FOTO scores for Dimas Darden on 5/18/2022.   FOTO documents entered into [employer] - see Media section.     Limitation Score: 35% (40% on evaluation on 5/2/2022)           Treatment     Dimas received the following supervised modalities after being cleared for contradictions for 8 minutes:   - MHP to R wrist (during simultaneous US)    Dimas received the following direct contact modalities after being cleared for " contraindications for 8 minutes:  - Ultrasound:  1 Mhz, 100% duty cycle, 1.0 w/cm2 to proximal forearm scar to decrease adhesions and to improve tissue extensibility    Dimas received the following manual therapy techniques for [facility] for 15 minutes:   - scar massage to decrease adhesions  - IASTM to dorsal surface of R forearm to increase circulation and tissue extensibility  - FA and wrist mobs  - STM to intrinsics to decrease tightness     Dimas received therapeutic exercises for [facility] for 22 minutes including:  - R wrist extrinsic stretches (flexors, extensors, and crossbody) x 30 sec each  - elbow flex/ext AROM with 4# DB, 3 ways, x 20 reps each  - forearm pron/sup AROM with 1# pronator wand x 20 reps  - wrist AROM with 2# DB, 3 ways over bolster, x 20 reps each  - UD with wrist extension with 1# DB (FA pronated) over bolster, x 20 reps  - UD slides on tabletop with pillowcase and 3# weight on dorsal surface of hand x 20 reps   - yellow putty pulls with small finger x 15 reps (not today)  - composite  strengthening with Progressive Hand Exerciser (3rd position) x 30 reps (not today)    Home Exercises and Education Provided     Education provided:   - progress toward goals    Written Home Exercises Provided: Patient instructed to cont prior HEP  Exercises were reviewed and Dimas was able to demonstrate them prior to the end of the session. Dimas demonstrated good understanding of the HEP provided.     See EMR under Patient Instructions for exercises provided during prior visit.        Assessment     Dimas was seen for his 7th tx session on this date. Pt unable to lift right small finger off table. Notified MD of deficits (difficulty with wrist extension, ulnar deviation, and small finger extension) after last session and he requested a follow-up apt. Informed pt to schedule apt.     Dimas is progressing towards his goals and there are no updates to goals at this time. Pt prognosis is Good.     Pt will  continue to benefit from skilled outpatient occupational therapy to address the deficits listed in the problem list on initial evaluation provide pt/family education and to maximize pt's level of independence in the home and community environment.     Pt's spiritual, cultural and educational needs considered and pt agreeable to plan of care and goals.    Anticipated barriers to occupational therapy: delayed start to therapy post-operatively    Goals:  Short Term Goals: (4 weeks)  1. Pt will be independent with HEP in 2 visits. - MET 5/9/2022  2. Pt will report decreased occurrences of R elbow intermittent shooting and aching pain. - MET 5/13/2022  3. Pt will increase R forearm sup AROM by 8-10 degrees to enable dressing, grooming activities.  4. Pt will increase R wrist flex/ext AROM by 10 degrees to enable dressing, grooming activities.     Long Term Goals: (8 weeks)  1. Pt will report decreased pain to 1-2/10 with ADLs. - MET 5/18/2022  2. Pt will exhibit 70-80 degrees of R supination AROM to enable independence with self-care and meal preparation.  3. Pt will exhibit 65-75 degrees of R wrist flexion/extension AROM to enable independence with self-care and meal preparation.  4. Pt will exhibit 70-80# of R  strength to allow a firm grasp on cooking utensils, steering wheel, etc.  5. Pt will exhibit a decrease in FOTO limitation score of <40% which would indicate an improvement in quality of life. - MET 5/18/2022  6. 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    Plan     Plan of Care Certification: 5/2/2022 to 8/2/2022.      Outpatient Occupational Therapy 2 times weekly for 6-8 weeks to include the following interventions: Electrical Stimulation NMES, Fluidotherapy, Manual Therapy, Moist Heat/ Ice, Neuromuscular Re-ed, Paraffin, Patient Education, Self Care, Therapeutic Activities, Therapeutic Exercise and Ultrasound      ДМИТРИЙ NOBLE OT

## 2022-05-27 ENCOUNTER — PATIENT MESSAGE (OUTPATIENT)
Dept: ORTHOPEDICS | Facility: CLINIC | Age: 57
End: 2022-05-27
Payer: COMMERCIAL

## 2022-06-01 ENCOUNTER — CLINICAL SUPPORT (OUTPATIENT)
Dept: REHABILITATION | Facility: HOSPITAL | Age: 57
End: 2022-06-01
Payer: COMMERCIAL

## 2022-06-01 DIAGNOSIS — R29.898 DECREASED GRIP STRENGTH OF RIGHT HAND: ICD-10-CM

## 2022-06-01 DIAGNOSIS — R53.1 GENERALIZED WEAKNESS: ICD-10-CM

## 2022-06-01 DIAGNOSIS — M25.631 DECREASED RANGE OF MOTION OF RIGHT WRIST: Primary | ICD-10-CM

## 2022-06-01 PROCEDURE — 97110 THERAPEUTIC EXERCISES: CPT

## 2022-06-01 PROCEDURE — 97140 MANUAL THERAPY 1/> REGIONS: CPT

## 2022-06-01 PROCEDURE — 97035 APP MDLTY 1+ULTRASOUND EA 15: CPT

## 2022-06-03 ENCOUNTER — CLINICAL SUPPORT (OUTPATIENT)
Dept: REHABILITATION | Facility: HOSPITAL | Age: 57
End: 2022-06-03
Payer: COMMERCIAL

## 2022-06-03 DIAGNOSIS — R29.898 DECREASED GRIP STRENGTH OF RIGHT HAND: ICD-10-CM

## 2022-06-03 DIAGNOSIS — R53.1 GENERALIZED WEAKNESS: ICD-10-CM

## 2022-06-03 DIAGNOSIS — M25.631 DECREASED RANGE OF MOTION OF RIGHT WRIST: Primary | ICD-10-CM

## 2022-06-03 PROCEDURE — 97110 THERAPEUTIC EXERCISES: CPT

## 2022-06-03 PROCEDURE — 97140 MANUAL THERAPY 1/> REGIONS: CPT

## 2022-06-03 NOTE — PROGRESS NOTES
"  Occupational Therapy Treatment Note     Date: 6/3/2022  Name: Dimas Darden  Clinic Number: [id number]    Therapy Diagnosis:   Encounter Diagnoses   Name Primary?    Decreased range of motion of right wrist Yes    Decreased  strength of right hand     Generalized weakness      Physician: Julia Quintana PA    Physician Orders: OT eval and tx  Medical Diagnosis: S46.211D (ICD-10-CM) - Traumatic rupture of right distal biceps tendon, subsequent encounter  Evaluation Date: 5/2/2022  Insurance Authorization Period Expiration: 4/8/2023  Plan of Care Certification Period: 5/2/2022 to 8/2/2022     Visit # / Visits authorized: 9/12  FOTO: 2/3     Surgical Procedure: REPAIR, TENDON, BICEPS, DISTAL (Right) using Arthrex technique  Date of Surgery: 3/28/2022  Date of Return to MD: 6/7/2022     Precautions:  Standard (pt was 9 weeks post-op on 5/30/2022)    Time In: 1045  Time Out: 1130  Total Billable Time: 45 minutes    Subjective     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  Response to previous treatment: good  Functional change: improved tolerance for strengthening    Pain: 0/10 (none on evaluation)  Location: [facility]    Objective   Objective measures updated at progress report unless specified.       CMS Impairment/Limitation/Restriction for FOTO Upper Arm Survey     Therapist reviewed FOTO scores for Dimas Darden on 5/18/2022.   FOTO documents entered into [employer] - see Media section.     Limitation Score: 35% (40% on evaluation on 5/2/2022)           Treatment     Dimas received the following supervised modalities after being cleared for contradictions for 8 minutes:   - MHP to R wrist (during simultaneous US)    Dimas received the following direct contact modalities after being cleared for contraindications for 8 minutes:  - Ultrasound:  1 Mhz, 100% duty cycle, 1.0 w/cm2 to proximal forearm scar to decrease " adhesions and to improve tissue extensibility    Dimas received the following manual therapy techniques for [facility] for 10 minutes:   - scar massage to decrease adhesions  - IASTM to dorsal surface of R forearm to increase circulation and tissue extensibility  - FA and wrist mobs  - STM to intrinsics to decrease tightness     Dimas received therapeutic exercises for [facility] for 27 minutes including:  - R wrist extrinsic stretches (flexors, extensors, and crossbody) x 30 sec each  - elbow flex/ext AROM with 4# DB, 3 ways, x 20 reps each  - forearm pron/sup AROM with 2# pronator wand x 20 reps  - wrist AROM with 3# DB, 3 ways over bolster, x 20 reps each  - composite  strengthening with Progressive Hand Exerciser (4th position) x 30 reps  - UD slides on tabletop with pillowcase and 3# weight on dorsal surface of hand x 30 reps   - wrist extension AROM (with mod A to maintain ulnar deviation) over bolster 2x10  - small finger lifts of tabletop AAROM 2x10    Home Exercises and Education Provided     Education provided:   - progress toward goals    Written Home Exercises Provided: Patient instructed to cont prior HEP  Exercises were reviewed and Dimas was able to demonstrate them prior to the end of the session. Dimas demonstrated good understanding of the HEP provided.     See EMR under Patient Instructions for exercises provided during prior visit.        Assessment     Dimas was seen for his 9th tx session on this date. He continues to deviate radially with wrist extension and experiences mild wrist drop with ulnar deviation. He is also unable to lift his small finger off tabletop. He has a follow-up apt scheduled with MD next week regarding these concerns. Despite this, he tolerated an increase in resistance during other exercises.    Dimas is progressing towards his goals and there are no updates to goals at this time. Pt prognosis is Good.     Pt will continue to benefit from skilled outpatient occupational  therapy to address the deficits listed in the problem list on initial evaluation provide pt/family education and to maximize pt's level of independence in the home and community environment.     Pt's spiritual, cultural and educational needs considered and pt agreeable to plan of care and goals.    Anticipated barriers to occupational therapy: delayed start to therapy post-operatively    Goals:  Short Term Goals: (4 weeks)  1. Pt will be independent with HEP in 2 visits. - MET 5/9/2022  2. Pt will report decreased occurrences of R elbow intermittent shooting and aching pain. - MET 5/13/2022  3. Pt will increase R forearm sup AROM by 8-10 degrees to enable dressing, grooming activities.  4. Pt will increase R wrist flex/ext AROM by 10 degrees to enable dressing, grooming activities.     Long Term Goals: (8 weeks)  1. Pt will report decreased pain to 1-2/10 with ADLs. - MET 5/18/2022  2. Pt will exhibit 70-80 degrees of R supination AROM to enable independence with self-care and meal preparation.  3. Pt will exhibit 65-75 degrees of R wrist flexion/extension AROM to enable independence with self-care and meal preparation.  4. Pt will exhibit 70-80# of R  strength to allow a firm grasp on cooking utensils, steering wheel, etc.  5. Pt will exhibit a decrease in FOTO limitation score of <40% which would indicate an improvement in quality of life. - MET 5/18/2022  6. 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    Plan     Plan of Care Certification: 5/2/2022 to 8/2/2022.      Outpatient Occupational Therapy 2 times weekly for 6-8 weeks to include the following interventions: Electrical Stimulation NMES, Fluidotherapy, Manual Therapy, Moist Heat/ Ice, Neuromuscular Re-ed, Paraffin, Patient Education, Self Care, Therapeutic Activities, Therapeutic Exercise and Ultrasound      ДМИТРИЙ NOBLE OT

## 2022-06-06 ENCOUNTER — OFFICE VISIT (OUTPATIENT)
Dept: INTERNAL MEDICINE | Facility: CLINIC | Age: 57
End: 2022-06-06
Payer: COMMERCIAL

## 2022-06-06 VITALS
HEART RATE: 84 BPM | WEIGHT: 252 LBS | OXYGEN SATURATION: 97 % | TEMPERATURE: 99 F | BODY MASS INDEX: 32.34 KG/M2 | DIASTOLIC BLOOD PRESSURE: 88 MMHG | SYSTOLIC BLOOD PRESSURE: 130 MMHG | HEIGHT: 74 IN

## 2022-06-06 DIAGNOSIS — M54.50 CHRONIC LOW BACK PAIN WITHOUT SCIATICA, UNSPECIFIED BACK PAIN LATERALITY: ICD-10-CM

## 2022-06-06 DIAGNOSIS — G89.29 CHRONIC LOW BACK PAIN WITHOUT SCIATICA, UNSPECIFIED BACK PAIN LATERALITY: ICD-10-CM

## 2022-06-06 PROCEDURE — 3079F DIAST BP 80-89 MM HG: CPT | Mod: CPTII,S$GLB,, | Performed by: PHYSICIAN ASSISTANT

## 2022-06-06 PROCEDURE — 99999 PR PBB SHADOW E&M-EST. PATIENT-LVL III: CPT | Mod: PBBFAC,,, | Performed by: PHYSICIAN ASSISTANT

## 2022-06-06 PROCEDURE — 99213 PR OFFICE/OUTPT VISIT, EST, LEVL III, 20-29 MIN: ICD-10-PCS | Mod: S$GLB,,, | Performed by: PHYSICIAN ASSISTANT

## 2022-06-06 PROCEDURE — 1159F MED LIST DOCD IN RCRD: CPT | Mod: CPTII,S$GLB,, | Performed by: PHYSICIAN ASSISTANT

## 2022-06-06 PROCEDURE — 3075F SYST BP GE 130 - 139MM HG: CPT | Mod: CPTII,S$GLB,, | Performed by: PHYSICIAN ASSISTANT

## 2022-06-06 PROCEDURE — 99999 PR PBB SHADOW E&M-EST. PATIENT-LVL III: ICD-10-PCS | Mod: PBBFAC,,, | Performed by: PHYSICIAN ASSISTANT

## 2022-06-06 PROCEDURE — 1160F PR REVIEW ALL MEDS BY PRESCRIBER/CLIN PHARMACIST DOCUMENTED: ICD-10-PCS | Mod: CPTII,S$GLB,, | Performed by: PHYSICIAN ASSISTANT

## 2022-06-06 PROCEDURE — 1160F RVW MEDS BY RX/DR IN RCRD: CPT | Mod: CPTII,S$GLB,, | Performed by: PHYSICIAN ASSISTANT

## 2022-06-06 PROCEDURE — 3008F PR BODY MASS INDEX (BMI) DOCUMENTED: ICD-10-PCS | Mod: CPTII,S$GLB,, | Performed by: PHYSICIAN ASSISTANT

## 2022-06-06 PROCEDURE — 3075F PR MOST RECENT SYSTOLIC BLOOD PRESS GE 130-139MM HG: ICD-10-PCS | Mod: CPTII,S$GLB,, | Performed by: PHYSICIAN ASSISTANT

## 2022-06-06 PROCEDURE — 3008F BODY MASS INDEX DOCD: CPT | Mod: CPTII,S$GLB,, | Performed by: PHYSICIAN ASSISTANT

## 2022-06-06 PROCEDURE — 99213 OFFICE O/P EST LOW 20 MIN: CPT | Mod: S$GLB,,, | Performed by: PHYSICIAN ASSISTANT

## 2022-06-06 PROCEDURE — 1159F PR MEDICATION LIST DOCUMENTED IN MEDICAL RECORD: ICD-10-PCS | Mod: CPTII,S$GLB,, | Performed by: PHYSICIAN ASSISTANT

## 2022-06-06 PROCEDURE — 3079F PR MOST RECENT DIASTOLIC BLOOD PRESSURE 80-89 MM HG: ICD-10-PCS | Mod: CPTII,S$GLB,, | Performed by: PHYSICIAN ASSISTANT

## 2022-06-06 RX ORDER — TRAMADOL HYDROCHLORIDE 50 MG/1
50 TABLET ORAL EVERY 4 HOURS PRN
Qty: 30 TABLET | Refills: 0 | Status: SHIPPED | OUTPATIENT
Start: 2022-06-06 | End: 2022-06-13
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
## 2022-06-07 ENCOUNTER — OFFICE VISIT (OUTPATIENT)
Dept: ORTHOPEDICS | Facility: CLINIC | Age: 57
End: 2022-06-07
Payer: COMMERCIAL

## 2022-06-07 VITALS — HEIGHT: 74 IN | BODY MASS INDEX: 32.21 KG/M2 | WEIGHT: 251 LBS

## 2022-06-07 DIAGNOSIS — R20.2 NUMBNESS AND TINGLING IN BOTH HANDS: Primary | ICD-10-CM

## 2022-06-07 DIAGNOSIS — R20.0 NUMBNESS AND TINGLING IN BOTH HANDS: Primary | ICD-10-CM

## 2022-06-07 DIAGNOSIS — S46.211D TRAUMATIC RUPTURE OF RIGHT DISTAL BICEPS TENDON, SUBSEQUENT ENCOUNTER: Primary | ICD-10-CM

## 2022-06-07 PROCEDURE — 99999 PR PBB SHADOW E&M-EST. PATIENT-LVL III: ICD-10-PCS | Mod: PBBFAC,,, | Performed by: ORTHOPAEDIC SURGERY

## 2022-06-07 PROCEDURE — 1160F RVW MEDS BY RX/DR IN RCRD: CPT | Mod: CPTII,S$GLB,, | Performed by: ORTHOPAEDIC SURGERY

## 2022-06-07 PROCEDURE — 99024 PR POST-OP FOLLOW-UP VISIT: ICD-10-PCS | Mod: S$GLB,,, | Performed by: ORTHOPAEDIC SURGERY

## 2022-06-07 PROCEDURE — 1159F PR MEDICATION LIST DOCUMENTED IN MEDICAL RECORD: ICD-10-PCS | Mod: CPTII,S$GLB,, | Performed by: ORTHOPAEDIC SURGERY

## 2022-06-07 PROCEDURE — 1160F PR REVIEW ALL MEDS BY PRESCRIBER/CLIN PHARMACIST DOCUMENTED: ICD-10-PCS | Mod: CPTII,S$GLB,, | Performed by: ORTHOPAEDIC SURGERY

## 2022-06-07 PROCEDURE — 99999 PR PBB SHADOW E&M-EST. PATIENT-LVL III: CPT | Mod: PBBFAC,,, | Performed by: ORTHOPAEDIC SURGERY

## 2022-06-07 PROCEDURE — 3008F BODY MASS INDEX DOCD: CPT | Mod: CPTII,S$GLB,, | Performed by: ORTHOPAEDIC SURGERY

## 2022-06-07 PROCEDURE — 99024 POSTOP FOLLOW-UP VISIT: CPT | Mod: S$GLB,,, | Performed by: ORTHOPAEDIC SURGERY

## 2022-06-07 PROCEDURE — 3008F PR BODY MASS INDEX (BMI) DOCUMENTED: ICD-10-PCS | Mod: CPTII,S$GLB,, | Performed by: ORTHOPAEDIC SURGERY

## 2022-06-07 PROCEDURE — 1159F MED LIST DOCD IN RCRD: CPT | Mod: CPTII,S$GLB,, | Performed by: ORTHOPAEDIC SURGERY

## 2022-06-07 NOTE — PROGRESS NOTES
Subjective:     Patient ID: Dimas Darden is a 56 y.o. male.    Chief Complaint: Numbness of the Right Hand      HPI:  The patient is a 56-year-old male seen in follow-up after a right distal biceps tendon rupture repair date of surgery is 03/28/2022.  He was referred from physical therapy because of concerns of radially deviating with wrist extension wrist drop with ulnar deviation and unable to extend small finger metacarpophalangeal joint past neutral.  He says he is improving.  We have not done nerve conduction study after surgery.    Past Medical History:   Diagnosis Date    Back pain     Digestive disorder     Disc     Bulging disc in back    Diverticulitis     Hypertension      Past Surgical History:   Procedure Laterality Date    COLONOSCOPY N/A 3/13/2020    Procedure: COLONOSCOPY;  Surgeon: Mat Guerrero MD;  Location: [facility] ENDO;  Service: General;  Laterality: N/A;    ESOPHAGOGASTRODUODENOSCOPY N/A 4/15/2021    Procedure: EGD (ESOPHAGOGASTRODUODENOSCOPY) needs rapid COVID;  Surgeon: Josie Livingston MD;  Location: [facility] ENDO;  Service: Endoscopy;  Laterality: N/A;    INJECTION OF ANESTHETIC AGENT INTO TISSUE PLANE DEFINED BY TRANSVERSUS ABDOMINIS MUSCLE N/A 6/8/2021    Procedure: BLOCK, TRANSVERSUS ABDOMINIS PLANE;  Surgeon: Mat Guerrero MD;  Location: [facility] OR;  Service: General;  Laterality: N/A;    LAPAROSCOPIC SIGMOIDECTOMY N/A 6/8/2021    Procedure: COLECTOMY, SIGMOID, LAPAROSCOPIC;  Surgeon: Mat Guerrero MD;  Location: [facility] OR;  Service: General;  Laterality: N/A;    MOBILIZATION OF SPLENIC FLEXURE N/A 6/8/2021    Procedure: MOBILIZATION, SPLENIC FLEXURE;  Surgeon: Mat Guerrero MD;  Location: [facility] OR;  Service: General;  Laterality: N/A;  Laparoscopic    UNDESCENDED TESTICLE EXPLORATION Left     Age 15     Family History   Problem Relation Age of Onset    Diverticulitis Mother     No Known Problems Father     No Known Problems Sister     No Known Problems  Brother     No Known Problems Brother      Social History     Socioeconomic History    Marital status:    Tobacco Use    Smoking status: Former Smoker     Packs/day: 1.00     Years: 25.00     Pack years: 25.00     Quit date: 4/10/2012     Years since quitting: 10.1    Smokeless tobacco: Never Used   Substance and Sexual Activity    Alcohol use: Yes     Alcohol/week: 2.0 standard drinks     Types: 2 Glasses of wine per week     Comment: Socially on the weekend    Drug use: No     Social Determinants of Health     Financial Resource Strain: Low Risk     Difficulty of Paying Living Expenses: Not very hard   Food Insecurity: No Food Insecurity    Worried About Running Out of Food in the Last Year: Never true    Ran Out of Food in the Last Year: Never true   Transportation Needs: No Transportation Needs    Lack of Transportation (Medical): No    Lack of Transportation (Non-Medical): No   Physical Activity: Insufficiently Active    Days of Exercise per Week: 4 days    Minutes of Exercise per Session: 30 min   Stress: No Stress Concern Present    Feeling of Stress : Not at all   Social Connections: Unknown    Frequency of Communication with Friends and Family: Twice a week    Frequency of Social Gatherings with Friends and Family: Once a week    Active Member of Clubs or Organizations: No    Attends Club or Organization Meetings: Never    Marital Status:    Housing Stability: Low Risk     Unable to Pay for Housing in the Last Year: No    Number of Places Lived in the Last Year: 1    Unstable Housing in the Last Year: No     Medication List with Changes/Refills   Current Medications    AMLODIPINE (NORVASC) 5 MG TABLET    Take 1 tablet (5 mg total) by mouth once daily.    METHOCARBAMOL (ROBAXIN) 500 MG TAB    TAKE 1 TABLET BY MOUTH 4 TIMES DAILY FOR 10 DAYS    PANTOPRAZOLE (PROTONIX) 40 MG TABLET    Take 1 tablet by mouth once daily    TRAMADOL (ULTRAM) 50 MG TABLET    Take 1 tablet (50 mg  total) by mouth every 8 (eight) hours as needed.    TRAMADOL (ULTRAM) 50 MG TABLET    Take 1 tablet (50 mg total) by mouth every 4 (four) hours as needed for Pain.     Review of patient's allergies indicates:  No Known Allergies  Review of Systems   Constitutional: Negative for malaise/fatigue.   HENT: Negative for hearing loss.    Eyes: Negative for double vision and visual disturbance.   Cardiovascular: Negative for chest pain.   Respiratory: Negative for shortness of breath.    Endocrine: Negative for cold intolerance.   Hematologic/Lymphatic: Does not bruise/bleed easily.   Skin: Negative for poor wound healing and suspicious lesions.   Musculoskeletal: Positive for muscle weakness. Negative for gout, joint pain and joint swelling.   Gastrointestinal: Positive for abdominal pain and heartburn. Negative for nausea and vomiting.   Genitourinary: Negative for dysuria.   Neurological: Negative for numbness, paresthesias and sensory change.   Psychiatric/Behavioral: Negative for depression, memory loss and substance abuse. The patient is not nervous/anxious.    Allergic/Immunologic: Negative for persistent infections.       Objective:   Body mass index is 32.23 kg/m².  There were no vitals filed for this visit.             General    Constitutional: He is oriented to person, place, and time. He appears well-developed and well-nourished. No distress.   HENT:   Head: Normocephalic.   Eyes: EOM are normal.   Pulmonary/Chest: Effort normal.   Neurological: He is oriented to person, place, and time.   Psychiatric: He has a normal mood and affect.             Right Hand/Wrist Exam     Other     Neuorologic Exam    Median Distribution: normal  Ulnar Distribution: normal  Radial Distribution: abnormal      Right Elbow Exam     Inspection   Scars: present  Effusion: absent    Other   Sensation: normal    Comments:  The patient has a well-healed volar scar right proximal forearm.  There is no sign of infection.  He has  decreased sensation in the dorsal aspect of the left hand.  He has no active extension past neutral of the ring and small finger.  He has full flexion.  He has some weakness of the extensor carpi ulnaris tendon but grade 4/5.          Vascular Exam       Capillary Refill  Right Hand: normal capillary refill            radiographs were not obtained today  Assessment:     Status post right distal biceps tendon rupture repair  Right radial nerve neurapraxia, improving     Plan:     The patient was sent for nerve conduction studies for further documentation and will return with that study.                Disclaimer: This note was prepared using a voice recognition system and is likely to have sound alike errors within the text.

## 2022-06-15 ENCOUNTER — CLINICAL SUPPORT (OUTPATIENT)
Dept: REHABILITATION | Facility: HOSPITAL | Age: 57
End: 2022-06-15
Payer: COMMERCIAL

## 2022-06-15 DIAGNOSIS — R53.1 GENERALIZED WEAKNESS: ICD-10-CM

## 2022-06-15 DIAGNOSIS — M25.631 DECREASED RANGE OF MOTION OF RIGHT WRIST: Primary | ICD-10-CM

## 2022-06-15 DIAGNOSIS — R29.898 DECREASED GRIP STRENGTH OF RIGHT HAND: ICD-10-CM

## 2022-06-15 PROCEDURE — 97110 THERAPEUTIC EXERCISES: CPT

## 2022-06-15 PROCEDURE — 97035 APP MDLTY 1+ULTRASOUND EA 15: CPT

## 2022-06-15 PROCEDURE — 97140 MANUAL THERAPY 1/> REGIONS: CPT

## 2022-06-15 NOTE — PROGRESS NOTES
"  Occupational Therapy Treatment Note     Date: 6/15/2022  Name: Dimas Darden  Clinic Number: [id number]    Therapy Diagnosis:   Encounter Diagnoses   Name Primary?    Decreased range of motion of right wrist Yes    Decreased  strength of right hand     Generalized weakness      Physician: Julia Quintana, PA    Physician Orders: OT eval and tx  Medical Diagnosis: S46.211D (ICD-10-CM) - Traumatic rupture of right distal biceps tendon, subsequent encounter  Evaluation Date: 5/2/2022  Insurance Authorization Period Expiration: 4/8/2023  Plan of Care Certification Period: 5/2/2022 to 8/2/2022     Visit # / Visits authorized: 9/12  FOTO: 2/3     Surgical Procedure: REPAIR, TENDON, BICEPS, DISTAL (Right) using Arthrex technique  Date of Surgery: 3/28/2022  Date of Return to MD: 6/7/2022     Precautions:  Standard (pt was 9 weeks post-op on 5/30/2022)    Time In: 1145  Time Out: 1230  Total Billable Time: 45 minutes    Subjective     Pt reports: "I had a follow-up apt with my MD. He ordered a nerve conduction test but didn't seem too worried about it."    he was compliant with home exercise program given on evaluation.  Response to previous treatment: good  Functional change: improved tolerance for strengthening    Pain: 0/10 (none on evaluation)  Location: RUE    Objective   Objective measures updated at progress report unless specified.       CMS Impairment/Limitation/Restriction for FOTO Upper Arm Survey     Therapist reviewed FOTO scores for Dimas Darden on 5/18/2022.   FOTO documents entered into [facility] - see Media section.     Limitation Score: 35% (40% on evaluation on 5/2/2022)           Treatment     Dimas received the following supervised modalities after being cleared for contradictions for 8 minutes:   - MHP to R wrist (during simultaneous US)    Dimas received the following direct contact modalities after being cleared for contraindications for 8 minutes:  - Ultrasound:  1 Mhz, 100% duty " cycle, 1.0 w/cm2 to proximal forearm scar to decrease adhesions and to improve tissue extensibility    Dimas received the following manual therapy techniques for RUE for 15 minutes:   - scar massage to decrease adhesions  - IASTM to dorsal surface of R forearm to increase circulation and tissue extensibility  - vibration along radial nerve distribution of wrist and hand to facilitate regeneration and to increase proprioceptive awareness  - FA and wrist mobs  - STM to intrinsics to decrease tightness (not today)    Dimas received therapeutic exercises for RUE for 22 minutes including:  - R wrist extrinsic stretches (flexors, extensors, and crossbody) x 30 sec each (not today)  - elbow flex/ext AROM with 4# DB, 3 ways, x 20 reps each (not today)  - forearm pron/sup AROM with 2# pronator wand x 20 reps (not today)  - wrist AROM with 2# DB, 3 ways over bolster, x 20 reps each  - composite  strengthening with Progressive Hand Exerciser (4th position) x 30 reps  - UD slides on tabletop with pillowcase and 3# weight on dorsal surface of hand x 30 reps   - wrist extension AROM (with mod A to maintain ulnar deviation) over bolster 2x10  - small and ring finger lifts of tabletop AAROM x 20 reps    Home Exercises and Education Provided     Education provided:   - progress toward goals    Written Home Exercises Provided: Patient instructed to cont prior HEP  Exercises were reviewed and Dimas was able to demonstrate them prior to the end of the session. Dimas demonstrated good understanding of the HEP provided.     See EMR under Patient Instructions for exercises provided during prior visit.        Assessment     Dimas was seen for his 10th tx session on this date. 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 Introduced vibration along radial nerve distribution of wrist and hand to facilitate regeneration and to increase proprioceptive awareness. Noted slight improvement  with right ring and small finger metacarpal jt extension active range of motion on this date.     Dimas is progressing towards his goals and there are no updates to goals at this time. Pt prognosis is Good.     Pt will continue to benefit from skilled outpatient occupational therapy to address the deficits listed in the problem list on initial evaluation provide pt/family education and to maximize pt's level of independence in the home and community environment.     Pt's spiritual, cultural and educational needs considered and pt agreeable to plan of care and goals.    Anticipated barriers to occupational therapy: delayed start to therapy post-operatively    Goals:  Short Term Goals: (4 weeks)  1. Pt will be independent with HEP in 2 visits. - MET 5/9/2022  2. Pt will report decreased occurrences of R elbow intermittent shooting and aching pain. - MET 5/13/2022  3. Pt will increase R forearm sup AROM by 8-10 degrees to enable dressing, grooming activities.  4. Pt will increase R wrist flex/ext AROM by 10 degrees to enable dressing, grooming activities.     Long Term Goals: (8 weeks)  1. Pt will report decreased pain to 1-2/10 with ADLs. - MET 5/18/2022  2. Pt will exhibit 70-80 degrees of R supination AROM to enable independence with self-care and meal preparation.  3. Pt will exhibit 65-75 degrees of R wrist flexion/extension AROM to enable independence with self-care and meal preparation.  4. Pt will exhibit 70-80# of R  strength to allow a firm grasp on cooking utensils, steering wheel, etc.  5. Pt will exhibit a decrease in FOTO limitation score of <40% which would indicate an improvement in quality of life. - MET 5/18/2022  6. 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    Plan     Plan of Care Certification: 5/2/2022 to 8/2/2022.      Outpatient Occupational Therapy 2 times weekly for 6-8 weeks to include the following interventions: Electrical Stimulation NMES,  Fluidotherapy, Manual Therapy, Moist Heat/ Ice, Neuromuscular Re-ed, Paraffin, Patient Education, Self Care, Therapeutic Activities, Therapeutic Exercise and Ultrasound      ДМИТРИЙ NOBLE, OT

## 2022-06-22 ENCOUNTER — CLINICAL SUPPORT (OUTPATIENT)
Dept: REHABILITATION | Facility: HOSPITAL | Age: 57
End: 2022-06-22
Payer: COMMERCIAL

## 2022-06-22 DIAGNOSIS — R53.1 GENERALIZED WEAKNESS: ICD-10-CM

## 2022-06-22 DIAGNOSIS — R29.898 DECREASED GRIP STRENGTH OF RIGHT HAND: ICD-10-CM

## 2022-06-22 DIAGNOSIS — M25.631 DECREASED RANGE OF MOTION OF RIGHT WRIST: Primary | ICD-10-CM

## 2022-06-22 PROCEDURE — 97140 MANUAL THERAPY 1/> REGIONS: CPT

## 2022-06-22 PROCEDURE — 97110 THERAPEUTIC EXERCISES: CPT

## 2022-06-22 NOTE — PROGRESS NOTES
"  Occupational Therapy Treatment Note     Date: 6/22/2022  Name: Dimas Darden  Clinic Number: [id number]    Therapy Diagnosis:   Encounter Diagnoses   Name Primary?    Decreased range of motion of right wrist Yes    Decreased  strength of right hand     Generalized weakness      Physician: Julia Quintana PA    Physician Orders: OT eval and tx  Medical Diagnosis: S46.211D (ICD-10-CM) - Traumatic rupture of right distal biceps tendon, subsequent encounter  Evaluation Date: 5/2/2022  Insurance Authorization Period Expiration: 4/8/2023  Plan of Care Certification Period: 5/2/2022 to 8/2/2022     Visit # / Visits authorized: 11/12  FOTO: 2/3     Surgical Procedure: REPAIR, TENDON, BICEPS, DISTAL (Right) using Arthrex technique  Date of Surgery: 3/28/2022  Date of Return to MD: 6/7/2022     Precautions:  Standard (pt was 9 weeks post-op on 5/30/2022)    Time In: 1145  Time Out: 1230  Total Billable Time: 45 minutes    Subjective     Pt reports: "I still have that numbness and mild swelling on the back of my wrist."    he was compliant with home exercise program given on evaluation.  Response to previous treatment: good  Functional change: excellent compliance with HEP    Pain: 0/10 (none on evaluation)  Location: RUE    Objective   Objective measures updated at progress report unless specified.       CMS Impairment/Limitation/Restriction for FOTO Upper Arm Survey     Therapist reviewed FOTO scores for Dimas Darden on 5/18/2022.   FOTO documents entered into [facility] - see Media section.     Limitation Score: 35% (40% on evaluation on 5/2/2022)           Treatment     Dimas received the following supervised modalities after being cleared for contradictions for 8 minutes:   - MHP to R wrist (during simultaneous US)    Dimas received the following direct contact modalities after being cleared for contraindications for 8 minutes:  - Ultrasound:  1 Mhz, 100% duty cycle, 1.0 w/cm2 to proximal forearm scar " to decrease adhesions and to improve tissue extensibility    Dimas received the following manual therapy techniques for RUE for 10 minutes:   - scar massage to decrease adhesions  - IASTM to dorsal surface of R forearm to increase circulation and tissue extensibility  - vibration along radial nerve distribution of wrist and hand to facilitate regeneration and to increase proprioceptive awareness  - FA and wrist mobs  - STM to intrinsics to decrease tightness     Dimas received therapeutic exercises for RUE for 27 minutes including:  - R wrist extrinsic stretches (flexors, extensors, and crossbody) x 30 sec each   - elbow flex/ext AROM with 4# DB, 3 ways, x 20 reps each   - forearm pron/sup AROM with 2# pronator wand x 20 reps   - wrist AROM with 2# DB, 3 ways over bolster, x 20 reps each  - composite  strengthening with Progressive Hand Exerciser (4th position) x 30 reps  - UD slides on tabletop with pillowcase and 3# weight on dorsal surface of hand x 30 reps   - wrist extension AROM (with mod A to maintain ulnar deviation) over bolster 2x10 (not today)  - small and ring finger lifts of tabletop AAROM x 20 reps    Home Exercises and Education Provided     Education provided:   - progress toward goals    Written Home Exercises Provided: Patient instructed to cont prior HEP  Exercises were reviewed and Dimas was able to demonstrate them prior to the end of the session. Dimas demonstrated good understanding of the HEP provided.     See EMR under Patient Instructions for exercises provided during prior visit.        Assessment     Dimas was seen for his 11th tx session on this date. 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 Despite this, he is tolerating therapy well and making steady progress. Recommend continued occupational therapy  sessions to address these remaining deficits. Plan to complete progress note next session and seek more visits.    Dimas is progressing towards his goals and there are no updates to goals at this time. Pt prognosis is Good.     Pt will continue to benefit from skilled outpatient occupational therapy to address the deficits listed in the problem list on initial evaluation provide pt/family education and to maximize pt's level of independence in the home and community environment.     Pt's spiritual, cultural and educational needs considered and pt agreeable to plan of care and goals.    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 Pt will be independent with HEP in 2 visits. - MET 5/9/2022  2. Pt will report decreased occurrences of R elbow intermittent shooting and aching pain. - MET 5/13/2022  3. Pt will increase R forearm sup AROM by 8-10 degrees to enable dressing, grooming activities.  4. Pt will increase R wrist flex/ext AROM by 10 degrees to enable dressing, grooming activities.     Long Term Goals: (8 weeks)  1. Pt will report decreased pain to 1-2/10 with ADLs. - MET 5/18/2022  2. Pt will exhibit 70-80 degrees of R supination AROM to enable independence with self-care and meal preparation.  3. Pt will exhibit 65-75 degrees of R wrist flexion/extension AROM to enable independence with self-care and meal preparation.  4. Pt will exhibit 70-80# of R  strength to allow a firm grasp on cooking utensils, steering wheel, etc.  5. Pt will exhibit a decrease in FOTO limitation score of <40% which would indicate an improvement in quality of life. - MET 5/18/2022  6. 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    Plan     Plan of Care Certification: 5/2/2022 to 8/2/2022.      Outpatient Occupational Therapy 2 times weekly for 6-8 weeks to include the following interventions: Electrical Stimulation NMES,  Fluidotherapy, Manual Therapy, Moist Heat/ Ice, Neuromuscular Re-ed, Paraffin, Patient Education, Self Care, Therapeutic Activities, Therapeutic Exercise and Ultrasound      ДМИТРИЙ NOBLE, OT

## 2022-06-28 NOTE — PROGRESS NOTES
"  Occupational Therapy Treatment Note & Updated Plan of Care     Date: 6/29/2022  Name: Dimas Darden  Clinic Number: [id number]    Therapy Diagnosis:   Encounter Diagnoses   Name Primary?    Decreased range of motion of right wrist Yes    Decreased  strength of right hand     Generalized weakness      Physician: Julia Quintana PA    Physician Orders: OT eval and tx  Medical Diagnosis: S46.211D (ICD-10-CM) - Traumatic rupture of right distal biceps tendon, subsequent encounter  * New/continuation orders received from MD on 6/28/2022 with same medical diagnosis  Evaluation Date: 5/2/2022  Insurance Authorization Period Expiration: 4/8/2023  Current Plan of Care Certification Period: 5/2/2022 to 8/2/2022 (Plan of Care updated on this date. New Cert Period: 6/29/2022 - 8/29/2022)     Visit # / Visits authorized: 12/12  FOTO: 3/3     Surgical Procedure: REPAIR, TENDON, BICEPS, DISTAL (Right) using Arthrex technique  Date of Surgery: 3/28/2022  Date of Return to MD: 6/7/2022     Precautions:  Standard (pt was 13 weeks post-op on 6/27/2022)    Time In: 1145  Time Out: 1230  Total Billable Time: 45 minutes    Subjective     Pt reports: "I still have some numbness on the back of my wrist."    he was compliant with home exercise program given on evaluation.  Response to previous treatment: good  Functional change: decreased edema, improved wrist active range of motion     Pain: 0/10 (none on evaluation)  Location: RUE    Objective   Objective measures updated on this date.    Edema:    (in cm) R R L L     5/2/2022 6/29/2022 6/29/2022 5/2/2022   2" below elbow crease 34.5cm 33.0cm 33cm 33cm   2" above wrist crease 21.0cm NT 21.5cm NT   Wrist crease 18.3cm 18cm 18cm 18cm     Right Upper Extremity Active Range of Motion:     ROM (in degrees) 6/29/2022 5/2/2022   Elbow flexion WNL WNL   Elbow extension WNL WNL   Pronation WNL 74   Supination 62 58   Radial deviation WNL 20   Ulnar deviation 28 24   Wrist flexion " 69 50   Wrist extension 67 32       and Pinch Strength (in pounds, psi's):    Left Left Right Right     6/29/2022 5/2/2022 6/29/2022 5/2/2022     93 74 58   Lateral 22 23 20 14   Tripod 24 25 18 16   Tip 18 15 15 12       CMS Impairment/Limitation/Restriction for FOTO Upper Arm Survey     Therapist reviewed FOTO scores for Dimas Darden on 6/29/2022.   FOTO documents entered into [employer] - see Media section.     Limitation Score: 50% (40% on evaluation on 5/2/2022)           Treatment     Dimas received the following direct contact modalities after being cleared for contraindications for 8 minutes:  - Ultrasound:  1 Mhz, 100% duty cycle, 1.0 w/cm2 to proximal forearm scar to decrease adhesions and to improve tissue extensibility    Dimas received the following manual therapy techniques for RUE for 12 minutes:   - IASTM to dorsal surface of distal R forearm to increase circulation and decrease edema  - vibration along radial nerve distribution of wrist and hand to facilitate regeneration and to increase proprioceptive awareness  - FA and wrist mobs  - STM to intrinsics to decrease tightness     Dimas received therapeutic exercises for RUE for 25 minutes including:  - R wrist extrinsic stretches (flexors, extensors, and crossbody) x 30 sec each   - composite  strengthening with Progressive Hand Exerciser (4th position) x 30 reps  - UD slides on tabletop with pillowcase and 3# weight on dorsal surface of hand x 30 reps   - wrist extension AROM (with mod A to maintain ulnar deviation) over bolster 2x10 (not today)  - small and ring finger lifts of tabletop AAROM x 20 reps    Not Today:  - elbow flex/ext AROM with 4# DB, 3 ways, x 20 reps each   - forearm pron/sup AROM with 2# pronator wand x 20 reps   - wrist AROM with 2# DB, 3 ways over bolster, x 20 reps each    Home Exercises and Education Provided     Education provided:   - progress toward goals    Written Home Exercises Provided: Patient  instructed to cont prior HEP  Exercises were reviewed and Dimas was able to demonstrate them prior to the end of the session. Dimas demonstrated good understanding of the HEP provided.     See EMR under Patient Instructions for exercises provided during prior visit.        Assessment     Dimas was seen for his 12th tx session on this date. New/continuation orders received from MD yesterday (6/28/2022). Objective measures/Progress note and Plan of Care updated/completed on this date. He is demonstrating decreased edema, improved right elbow, forearm, and wrist active range of motion, and improved  strength. 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 He also continues to be limited by numbness on the dorsal surface of his distal forearm. Per FOTO assessment tool, he does not perceive his function to be improving yet. Therefore, he will continue to benefit from skilled occupational therapy sessions to address these remaining deficits.     Dimas is progressing towards his goals. He has met 3/4 STG's and 3/6 LTG's. Two new STG's set on this date. Continue all unmet STG's and LTG's.  Pt prognosis is Good.     Pt will continue to benefit from skilled outpatient occupational therapy to address the deficits listed in the problem list on initial evaluation provide pt/family education and to maximize pt's level of independence in the home and community environment.     Pt's spiritual, cultural and educational needs considered and pt agreeable to plan of care and goals.    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 Pt will be independent with HEP in 2 visits. - MET 5/9/2022  2. Pt will report decreased occurrences of R elbow intermittent shooting and aching pain. - MET 5/13/2022  3. Pt will increase R  forearm sup AROM by 8-10 degrees to enable dressing, grooming activities. - progressing, continue goal 6/29/2022  4. Pt will increase R wrist flex/ext AROM by 10 degrees to enable dressing, grooming activities. - MET 6/29/2022    New Short Term Goals: (4 weeks - set on 6/29/2022)  1. Pt will exhibit decreased right wrist drop with ulnar deviation.  2. Pt will be able to actively extend right ring and small finger metacarpal joints and lift fingers off tabletop, needed for typing.     Original Long Term Goals: (8 weeks)  1. Pt will report decreased pain to 1-2/10 with ADLs. - MET 5/18/2022  2. Pt will exhibit 70-80 degrees of R supination AROM to enable independence with self-care and meal preparation. - progressing, continue goal 6/29/2022  3. Pt will exhibit 65-75 degrees of R wrist flexion/extension AROM to enable independence with self-care and meal preparation. - MET 6/29/2022  4. Pt will exhibit 70-80# of R  strength to allow a firm grasp on cooking utensils, steering wheel, etc. - MET 6/29/2022  5. Pt will exhibit a decrease in FOTO limitation score of <40% which would indicate an improvement in quality of life. - previously met on 5/18/2022, not met on 6/29/2022 - continue goal  6. 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 - progressing, continue goal 6/29/2022    Plan     Current Plan of Care Certification: 5/2/2022 to 8/2/2022 (Plan of Care updated on this date. New Cert Period: 6/29/2022 - 8/29/2022)     Outpatient Occupational Therapy 1-2 times weekly for 6-8 weeks to include the following interventions: Electrical Stimulation NMES, Fluidotherapy, Manual Therapy, Moist Heat/ Ice, Neuromuscular Re-ed, Paraffin, Patient Education, Self Care, Therapeutic Activities, Therapeutic Exercise and Ultrasound      ДМИТРИЙ NOBLE OT

## 2022-06-29 ENCOUNTER — CLINICAL SUPPORT (OUTPATIENT)
Dept: REHABILITATION | Facility: HOSPITAL | Age: 57
End: 2022-06-29
Payer: COMMERCIAL

## 2022-06-29 DIAGNOSIS — R29.898 DECREASED GRIP STRENGTH OF RIGHT HAND: ICD-10-CM

## 2022-06-29 DIAGNOSIS — M25.631 DECREASED RANGE OF MOTION OF RIGHT WRIST: Primary | ICD-10-CM

## 2022-06-29 DIAGNOSIS — R53.1 GENERALIZED WEAKNESS: ICD-10-CM

## 2022-06-29 PROCEDURE — 97140 MANUAL THERAPY 1/> REGIONS: CPT

## 2022-06-29 PROCEDURE — 97110 THERAPEUTIC EXERCISES: CPT

## 2022-06-29 NOTE — PLAN OF CARE
"  Occupational Therapy Updated Plan of Care     Date: 6/29/2022  Name: Dimas Darden  Clinic Number: [id number]    Therapy Diagnosis:   Encounter Diagnoses   Name Primary?    Decreased range of motion of right wrist Yes    Decreased  strength of right hand     Generalized weakness      Physician: Julia Quintana PA    Physician Orders: OT eval and tx  Medical Diagnosis: S46.211D (ICD-10-CM) - Traumatic rupture of right distal biceps tendon, subsequent encounter  * New/continuation orders received from MD on 6/28/2022 with same medical diagnosis  Evaluation Date: 5/2/2022  Insurance Authorization Period Expiration: 4/8/2023  Current Plan of Care Certification Period: 5/2/2022 to 8/2/2022 (New Cert Period: 6/29/2022 - 8/29/2022)     Visit # / Visits authorized: 12/12  FOTO: 3/3     Surgical Procedure: REPAIR, TENDON, BICEPS, DISTAL (Right) using Arthrex technique  Date of Surgery: 3/28/2022     Precautions:  Standard (pt was 13 weeks post-op on 6/27/2022)    Subjective     Pt reports: "I still have some numbness on the back of my wrist."    Pain: 0/10   Location: RUE    Objective     Edema:    (in cm) R R L L     5/2/2022 6/29/2022 6/29/2022 5/2/2022   2" below elbow crease 34.5cm 33.0cm 33cm 33cm   2" above wrist crease 21.0cm NT 21.5cm NT   Wrist crease 18.3cm 18cm 18cm 18cm     Right Upper Extremity Active Range of Motion:     ROM (in degrees) 6/29/2022 5/2/2022   Elbow flexion WNL WNL   Elbow extension WNL WNL   Pronation WNL 74   Supination 62 58   Radial deviation WNL 20   Ulnar deviation 28 24   Wrist flexion 69 50   Wrist extension 67 32       and Pinch Strength (in pounds, psi's):    Left Left Right Right     6/29/2022 5/2/2022 6/29/2022 5/2/2022     93 74 58   Lateral 22 23 20 14   Tripod 24 25 18 16   Tip 18 15 15 12       CMS Impairment/Limitation/Restriction for FOTO Upper Arm Survey     Therapist reviewed FOTO scores for Dimas Darden on 6/29/2022.   FOTO documents " entered into [facility] - see Media section.     Limitation Score: 50% (40% on evaluation on 5/2/2022)          Assessment     Dimas was seen for his 12th tx session on this date. New/continuation orders received from MD yesterday (6/28/2022). Objective measures/Progress note and Plan of Care updated/completed on this date. He is demonstrating decreased edema, improved right elbow, forearm, and wrist active range of motion, and improved  strength. 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 He also continues to be limited by numbness on the dorsal surface of his distal forearm. Per FOTO assessment tool, he does not perceive his function to be improving yet. Therefore, he will continue to benefit from skilled occupational therapy sessions to address these remaining deficits.     Dimas is progressing towards his goals. He has met 3/4 STG's and 3/6 LTG's. Two new STG's set on this date. Continue all unmet STG's and LTG's.  Pt prognosis is Good.     Pt will continue to benefit from skilled outpatient occupational therapy to address the deficits listed in the problem list on initial evaluation provide pt/family education and to maximize pt's level of independence in the home and community environment.     Pt's spiritual, cultural and educational needs considered and pt agreeable to plan of care and goals.    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 Pt will be independent with HEP in 2 visits. - MET 5/9/2022  2. Pt will report decreased occurrences of R elbow intermittent shooting and aching pain. - MET 5/13/2022  3. Pt will increase R forearm sup AROM by 8-10 degrees to enable dressing, grooming activities. - progressing, continue goal 6/29/2022  4. Pt will increase R wrist flex/ext AROM by 10 degrees to  enable dressing, grooming activities. - MET 6/29/2022    New Short Term Goals: (4 weeks - set on 6/29/2022)  1. Pt will exhibit decreased right wrist drop with ulnar deviation.  2. Pt will be able to actively extend right ring and small finger metacarpal joints and lift fingers off tabletop, needed for typing.     Original Long Term Goals: (8 weeks)  1. Pt will report decreased pain to 1-2/10 with ADLs. - MET 5/18/2022  2. Pt will exhibit 70-80 degrees of R supination AROM to enable independence with self-care and meal preparation. - progressing, continue goal 6/29/2022  3. Pt will exhibit 65-75 degrees of R wrist flexion/extension AROM to enable independence with self-care and meal preparation. - MET 6/29/2022  4. Pt will exhibit 70-80# of R  strength to allow a firm grasp on cooking utensils, steering wheel, etc. - MET 6/29/2022  5. Pt will exhibit a decrease in FOTO limitation score of <40% which would indicate an improvement in quality of life. - previously met on 5/18/2022, not met on 6/29/2022 - continue goal  6. 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 - progressing, continue goal 6/29/2022    Plan     Current Plan of Care Certification: 5/2/2022 to 8/2/2022    New Cert Period: 6/29/2022 - 8/29/2022     Outpatient Occupational Therapy 1-2 times weekly for 6-8 weeks to include the following interventions: Electrical Stimulation NMES, Fluidotherapy, Manual Therapy, Moist Heat/ Ice, Neuromuscular Re-ed, Paraffin, Patient Education, Self Care, Therapeutic Activities, Therapeutic Exercise and Ultrasound      ДМИТРИЙ NOBLE OT

## 2022-07-11 ENCOUNTER — PATIENT MESSAGE (OUTPATIENT)
Dept: INTERNAL MEDICINE | Facility: CLINIC | Age: 57
End: 2022-07-11
Payer: COMMERCIAL

## 2022-07-11 DIAGNOSIS — G89.29 CHRONIC LOW BACK PAIN WITHOUT SCIATICA, UNSPECIFIED BACK PAIN LATERALITY: ICD-10-CM

## 2022-07-11 DIAGNOSIS — M54.50 CHRONIC LOW BACK PAIN WITHOUT SCIATICA, UNSPECIFIED BACK PAIN LATERALITY: ICD-10-CM

## 2022-07-12 ENCOUNTER — PATIENT MESSAGE (OUTPATIENT)
Dept: INTERNAL MEDICINE | Facility: CLINIC | Age: 57
End: 2022-07-12
Payer: COMMERCIAL

## 2022-07-12 ENCOUNTER — PATIENT MESSAGE (OUTPATIENT)
Dept: REHABILITATION | Facility: HOSPITAL | Age: 57
End: 2022-07-12
Payer: COMMERCIAL

## 2022-07-12 RX ORDER — TRAMADOL HYDROCHLORIDE 50 MG/1
50 TABLET ORAL EVERY 8 HOURS PRN
Qty: 30 TABLET | Refills: 0 | OUTPATIENT
Start: 2022-07-12

## 2022-07-12 RX ORDER — TRAMADOL HYDROCHLORIDE 50 MG/1
50 TABLET ORAL EVERY 8 HOURS PRN
Qty: 30 TABLET | Refills: 0 | Status: SHIPPED | OUTPATIENT
Start: 2022-07-12 | End: 2022-08-04 | Stop reason: SDUPTHER

## 2022-07-15 ENCOUNTER — OFFICE VISIT (OUTPATIENT)
Dept: INTERNAL MEDICINE | Facility: CLINIC | Age: 57
End: 2022-07-15
Payer: COMMERCIAL

## 2022-07-15 ENCOUNTER — OFFICE VISIT (OUTPATIENT)
Dept: PHYSICAL MEDICINE AND REHAB | Facility: CLINIC | Age: 57
End: 2022-07-15
Payer: COMMERCIAL

## 2022-07-15 VITALS
SYSTOLIC BLOOD PRESSURE: 167 MMHG | HEART RATE: 91 BPM | DIASTOLIC BLOOD PRESSURE: 116 MMHG | WEIGHT: 250 LBS | RESPIRATION RATE: 14 BRPM | BODY MASS INDEX: 32.08 KG/M2 | HEIGHT: 74 IN

## 2022-07-15 VITALS
HEART RATE: 90 BPM | SYSTOLIC BLOOD PRESSURE: 152 MMHG | OXYGEN SATURATION: 96 % | HEIGHT: 74 IN | RESPIRATION RATE: 18 BRPM | DIASTOLIC BLOOD PRESSURE: 100 MMHG | BODY MASS INDEX: 32.17 KG/M2 | WEIGHT: 250.69 LBS

## 2022-07-15 DIAGNOSIS — I10 ESSENTIAL HYPERTENSION: ICD-10-CM

## 2022-07-15 DIAGNOSIS — Z00.00 ROUTINE PHYSICAL EXAMINATION: Primary | ICD-10-CM

## 2022-07-15 DIAGNOSIS — Z11.59 ENCOUNTER FOR HEPATITIS C SCREENING TEST FOR LOW RISK PATIENT: ICD-10-CM

## 2022-07-15 DIAGNOSIS — G56.31 NEUROPATHY OF RIGHT RADIAL NERVE: ICD-10-CM

## 2022-07-15 PROCEDURE — 99999 PR PBB SHADOW E&M-EST. PATIENT-LVL III: ICD-10-PCS | Mod: PBBFAC,,, | Performed by: PHYSICAL MEDICINE & REHABILITATION

## 2022-07-15 PROCEDURE — 99203 OFFICE O/P NEW LOW 30 MIN: CPT | Mod: 25,S$GLB,, | Performed by: PHYSICAL MEDICINE & REHABILITATION

## 2022-07-15 PROCEDURE — 99999 PR PBB SHADOW E&M-EST. PATIENT-LVL III: CPT | Mod: PBBFAC,,, | Performed by: PHYSICAL MEDICINE & REHABILITATION

## 2022-07-15 PROCEDURE — 3077F PR MOST RECENT SYSTOLIC BLOOD PRESSURE >= 140 MM HG: ICD-10-PCS | Mod: CPTII,S$GLB,, | Performed by: FAMILY MEDICINE

## 2022-07-15 PROCEDURE — 3080F DIAST BP >= 90 MM HG: CPT | Mod: CPTII,S$GLB,, | Performed by: FAMILY MEDICINE

## 2022-07-15 PROCEDURE — 95885 PR MUSC TST DONE W/NERV TST LIM: ICD-10-PCS | Mod: S$GLB,,, | Performed by: PHYSICAL MEDICINE & REHABILITATION

## 2022-07-15 PROCEDURE — 99396 PREV VISIT EST AGE 40-64: CPT | Mod: S$GLB,,, | Performed by: FAMILY MEDICINE

## 2022-07-15 PROCEDURE — 95911 NRV CNDJ TEST 9-10 STUDIES: CPT | Mod: S$GLB,,, | Performed by: PHYSICAL MEDICINE & REHABILITATION

## 2022-07-15 PROCEDURE — 95911 PR NERVE CONDUCTION STUDY; 9-10 STUDIES: ICD-10-PCS | Mod: S$GLB,,, | Performed by: PHYSICAL MEDICINE & REHABILITATION

## 2022-07-15 PROCEDURE — 99999 PR PBB SHADOW E&M-EST. PATIENT-LVL III: CPT | Mod: PBBFAC,,, | Performed by: FAMILY MEDICINE

## 2022-07-15 PROCEDURE — 3077F SYST BP >= 140 MM HG: CPT | Mod: CPTII,S$GLB,, | Performed by: FAMILY MEDICINE

## 2022-07-15 PROCEDURE — 3080F PR MOST RECENT DIASTOLIC BLOOD PRESSURE >= 90 MM HG: ICD-10-PCS | Mod: CPTII,S$GLB,, | Performed by: FAMILY MEDICINE

## 2022-07-15 PROCEDURE — 3008F PR BODY MASS INDEX (BMI) DOCUMENTED: ICD-10-PCS | Mod: CPTII,S$GLB,, | Performed by: FAMILY MEDICINE

## 2022-07-15 PROCEDURE — 99396 PR PREVENTIVE VISIT,EST,40-64: ICD-10-PCS | Mod: S$GLB,,, | Performed by: FAMILY MEDICINE

## 2022-07-15 PROCEDURE — 99999 PR PBB SHADOW E&M-EST. PATIENT-LVL III: ICD-10-PCS | Mod: PBBFAC,,, | Performed by: FAMILY MEDICINE

## 2022-07-15 PROCEDURE — 95885 MUSC TST DONE W/NERV TST LIM: CPT | Mod: S$GLB,,, | Performed by: PHYSICAL MEDICINE & REHABILITATION

## 2022-07-15 PROCEDURE — 3008F BODY MASS INDEX DOCD: CPT | Mod: CPTII,S$GLB,, | Performed by: FAMILY MEDICINE

## 2022-07-15 PROCEDURE — 99203 PR OFFICE/OUTPT VISIT, NEW, LEVL III, 30-44 MIN: ICD-10-PCS | Mod: 25,S$GLB,, | Performed by: PHYSICAL MEDICINE & REHABILITATION

## 2022-07-15 RX ORDER — AMLODIPINE BESYLATE 5 MG/1
10 TABLET ORAL DAILY
Qty: 270 TABLET | Refills: 1 | Status: SHIPPED | OUTPATIENT
Start: 2022-07-15 | End: 2023-06-06 | Stop reason: SDUPTHER

## 2022-07-15 NOTE — PROGRESS NOTES
[facility]   [address]  [place] [address]  Phone: [phone]        Full Name: Dimas Darden YOB: 1965  Patient ID: [id number]      Visit Date: 7/15/2022 11:41  Age: 56 Years 7 Months Old  Examining Physician: Clari Jasso M.D.  Referring Physician:   Reason for Referral: ue pain        Chief Complaint   Patient presents with    Numbness     hands     HPI: This is a 56 y.o.  male being seen in clinic today for evaluation of right wrist/hand weakness and mild numbness after distal biceps injury and repair.  He has had improvement of strength and ROM with therapy.  He has some limited ROM at his 4th and 5th digit-mainly in extension.  He has numbness/tingling along his forearm-mostly radial distribution.   History obtained from patient    Past family, medical, social, and surgical history reviewed in chart    Review of Systems:     General- denies lethargy, weight change, fever, chills  Head/neck- denies swallowing difficulties  ENT- denies hearing changes  Cardiovascular-denies chest pain  Pulmonary- denies shortness of breath  GI- denies constipation or bowel incontinence  - denies bladder incontinence  Skin- denies wounds or rashes  Musculoskeletal- +weakness, denies pain  Neurologic- +numbness and tingling  Psychiatric- denies depressive or psychotic features, denies anxiety  Lymphatic-denies swelling  Endocrine- denies hypoglycemic symptoms/DM history  All other pertinent systems negative     Physical Examination:  General: Well developed, well nourished male, NAD  HEENT:NCAT EOMI bilaterally   Pulmonary:Normal respirations    Spinal Examination: CERVICAL  Active ROM is within normal limits.  Inspection: No deformity of spinal alignment.    Musculoskeletal Tests:  Phalen: neg  Elbow compression (ulnar): neg  Tinels at wrist: neg    Bilateral Upper and Lower Extremities:  Pulses are 2+ at radial bilaterally.  Shoulder/Elbow/Wrist/Hand ROM wnl, surgical  scar at right prox forearm, mild hypersen near scar  Hip/Knee/Ankle ROM   Bilateral Extremities show normal capillary refill.  No signs of cyanosis, rubor, edema, skin changes, or dysvascular changes of appendages.  Nails appear intact.    Neurological Exam:  Cranial Nerves:  II-XII grossly intact    Manual Muscle Testing: (Motor 5=normal)  5/5 strength bilateral upper extremities    No focal atrophy is noted of either upper extremity.    Bilateral Reflexes:  Foote's response is absent bilaterally.    Sensation: tested to light touch  - intact in arms except dec at right forearm in dist of post cutaneous (radial) innervation  Gait: Narrow base and good arm swing.      Entire procedure explained to patient prior to proceeding.  Verbal consent obtained    SNC      Nerve / Sites Rec. Site Onset Lat Peak Lat Amp Segments Distance Velocity     ms ms µV  mm m/s   R Median - Digit II (Antidromic)      Wrist Dig II 2.7 3.5 19.1 Wrist - Dig  52   L Median - Digit II (Antidromic)      Wrist Dig II 2.8 3.6 14.5 Wrist - Dig  51   R Ulnar - Digit V (Antidromic)      Wrist Dig V 3.1 3.8 10.7 Wrist - Dig V 140 45   L Ulnar - Digit V (Antidromic)      Wrist Dig V 2.7 3.4 11.2 Wrist - Dig V 140 52   R Radial - Anatomical snuff box (Forearm)      1 Wrist 3.2 5.3 6.1 1 - Wrist 100 31   L Radial - Anatomical snuff box (Forearm)      Forearm Wrist 1.8 2.4 10.7 Forearm - Wrist 100 55       MNC      Nerve / Sites Muscle Latency Amplitude Duration Rel Amp Segments Distance Lat Diff Velocity     ms mV ms %  mm ms m/s   R Median - APB      Wrist APB 3.2 11.7 5.6 100 Wrist - APB 80        Elbow APB 7.8 9.8 5.7 83.2 Elbow - Wrist 230 4.5 51   L Median - APB      Wrist APB 2.8 7.8 5.8 100 Wrist - APB 80        Elbow APB 8.2 4.5 5.6 57.4 Elbow - Wrist 270 5.4 50   R Ulnar - ADM      Wrist ADM 2.8 7.3 5.2 100 Wrist - ADM 80        B.Elbow ADM 6.8 8.1 4.9 112 B.Elbow - Wrist 240 4.0 60      A.Elbow ADM 8.5 8.0 5.4 98.4 A.Elbow -  B.Elbow 110 1.7 64         A.Elbow - Wrist  5.7    L Ulnar - ADM      Wrist ADM 3.0 6.1 4.9 100 Wrist - ADM 80        B.Elbow ADM 7.1 7.0 5.2 114 B.Elbow - Wrist 250 4.1 61      A.Elbow ADM 9.1 6.2 5.3 89.5 A.Elbow - B.Elbow 110 1.9 57         A.Elbow - Wrist  6.0        EMG         EMG Summary Table     Spontaneous MUAP Recruitment   Muscle IA Fib PSW Fasc Other Dur. Dur Amp Dur Polys Pattern Effort   R. Brachioradialis N None None None .   N N N N Max   R. Extensor indicis proprius Incr None None None .   N N N sl red Max   R. Extensor carpi ulnaris Incr None None None .   N N N sl red Max                                  INTERPRETATION  -Bilateral median motor nerve conduction study showed normal latency, amplitude, and conduction velocity  -Bilateral median sensory nerve conduction study showed normal peak latency and amplitude  -Bilateral ulnar motor nerve conduction study showed normal latency, amplitude, and conduction velocity  -Bilateral ulnar sensory nerve conduction study showed normal peak latency and amplitude  -Bilateral radial sensory nerve conduction study showed prolonged peak latency on the right and dec amplitude on the right  -Needle EMG examination performed to above mentioned muscles       IMPRESSION  1. ABNORMAL study  2. There is electrodiagnostic evidence of a recovering right radial neuropathy that is located distal to brachioradialis innervation but proximal to extensor carpi ulnaris innervation. There were mild de-innervation potentials noted to ECU and EIP with good reinnervation potentials.    PLAN  Discussed in detail for greater than 30 minutes about diagnosis and treatment plan    1. Follow up with referring provider: Dr. Rafael Barbour*  2. Handouts on radial neuropathy provided. Cont therapy for improved ROM, strength, and nerve desensitization.  3. This study is good for one year. If symptoms worsen or do not improve, please re-consult.    Clari Jasso M.D.  Physical  Medicine and Rehab

## 2022-07-15 NOTE — PROGRESS NOTES
Dimas Darden  07/16/2022  [id number]    Angela Payne MD  Patient Care Team:  Angela Payne MD as PCP - General (Internal Medicine)  Yamel Maza LPN as Care Coordinator (Internal Medicine)    Has the patient seen any provider outside of the network since the last visit ? (no). If yes, HIPPA forms completed and records requested.      Visit Type:a scheduled routine follow-up visit    Chief Complaint:  Chief Complaint   Patient presents with    Annual Exam    Medication Refill       History of Present Illness:  HPI Mr. Darden presents today for his annual exam.     Weight gain  Back pain he notes more at a certain weight     Chronic back pain   He was not able to get last prescription for tramadol     Review of Systems   Constitutional: Negative for chills and fever.   HENT: Negative for congestion and tinnitus.    Eyes: Negative for blurred vision, pain and discharge.   Respiratory: Negative for cough and wheezing.    Cardiovascular: Negative for chest pain, palpitations, orthopnea and leg swelling.   Gastrointestinal: Negative for abdominal pain, blood in stool, constipation, diarrhea, heartburn, nausea and vomiting.   Genitourinary: Negative for dysuria, flank pain, frequency, hematuria and urgency.   Skin: Negative for itching and rash.   Neurological: Negative for dizziness, tingling and headaches.   Psychiatric/Behavioral: Negative for depression.         Screening Questionnaires:    In the last two weeks how often have you felt down, depressed, or hopeless ( no )    In the last two weeks how often have you had little interest or pleasure in doing  (no )    In the last two weeks how often have you been bothered by the following problems:  1. Feeling nervous, anxious, or on edge ( no )    2. Not being able to stop or control worrying ( no)    3. Worrying too much about different things ( no)    4. Trouble relaxing ( no )    5. Being so restless that it is hard to sit still  (no )    6. Becoming  easily annoyed or irritable (no)    7. Feeling afraid as if something awful might happen (no )    How often do you have a drink containing Alcohol? denied     Do you exercise  (no ) moderately active    Do you take a baby Aspirin daily ( no)    Do you have an advance directive ( no ) The patient was given information regarding Living Will/Durable Power-of- if requested.     The following were reviewed: Active problem list, medication list, allergies, family history, social history, and Health Maintenance.     History:  Past Medical History:   Diagnosis Date    Back pain     Digestive disorder     Disc     Bulging disc in back    Diverticulitis     Hypertension      Past Surgical History:   Procedure Laterality Date    COLONOSCOPY N/A 3/13/2020    Procedure: COLONOSCOPY;  Surgeon: Mat Guerrero MD;  Location: [facility] ENDO;  Service: General;  Laterality: N/A;    ESOPHAGOGASTRODUODENOSCOPY N/A 4/15/2021    Procedure: EGD (ESOPHAGOGASTRODUODENOSCOPY) needs rapid COVID;  Surgeon: Josie Livingston MD;  Location: [facility] ENDO;  Service: Endoscopy;  Laterality: N/A;    INJECTION OF ANESTHETIC AGENT INTO TISSUE PLANE DEFINED BY TRANSVERSUS ABDOMINIS MUSCLE N/A 6/8/2021    Procedure: BLOCK, TRANSVERSUS ABDOMINIS PLANE;  Surgeon: Mat Guerrero MD;  Location: [facility] OR;  Service: General;  Laterality: N/A;    LAPAROSCOPIC SIGMOIDECTOMY N/A 6/8/2021    Procedure: COLECTOMY, SIGMOID, LAPAROSCOPIC;  Surgeon: Mat Guerrero MD;  Location: [facility] OR;  Service: General;  Laterality: N/A;    MOBILIZATION OF SPLENIC FLEXURE N/A 6/8/2021    Procedure: MOBILIZATION, SPLENIC FLEXURE;  Surgeon: Mat Guerrero MD;  Location: [facility] OR;  Service: General;  Laterality: N/A;  Laparoscopic    UNDESCENDED TESTICLE EXPLORATION Left     Age 15     Family History   Problem Relation Age of Onset    Diverticulitis Mother     No Known Problems Father     No Known Problems Sister     No Known Problems Brother     No  Known Problems Brother      Social History     Socioeconomic History    Marital status:    Tobacco Use    Smoking status: Former Smoker     Packs/day: 1.00     Years: 25.00     Pack years: 25.00     Quit date: 4/10/2012     Years since quitting: 10.2    Smokeless tobacco: Never Used   Substance and Sexual Activity    Alcohol use: Yes     Alcohol/week: 2.0 standard drinks     Types: 2 Glasses of wine per week     Comment: Socially on the weekend    Drug use: No     Social Determinants of Health     Financial Resource Strain: Low Risk     Difficulty of Paying Living Expenses: Not very hard   Food Insecurity: No Food Insecurity    Worried About Running Out of Food in the Last Year: Never true    Ran Out of Food in the Last Year: Never true   Transportation Needs: No Transportation Needs    Lack of Transportation (Medical): No    Lack of Transportation (Non-Medical): No   Physical Activity: Insufficiently Active    Days of Exercise per Week: 4 days    Minutes of Exercise per Session: 30 min   Stress: No Stress Concern Present    Feeling of Stress : Not at all   Social Connections: Unknown    Frequency of Communication with Friends and Family: Twice a week    Frequency of Social Gatherings with Friends and Family: Once a week    Active Member of Clubs or Organizations: No    Attends Club or Organization Meetings: Never    Marital Status:    Housing Stability: Low Risk     Unable to Pay for Housing in the Last Year: No    Number of Places Lived in the Last Year: 1    Unstable Housing in the Last Year: No     Patient Active Problem List   Diagnosis    Diverticulitis of large intestine with perforation without bleeding    EKG abnormalities    GERD (gastroesophageal reflux disease)    Diverticulitis of sigmoid colon    Rupture of distal biceps tendon right    HTN (hypertension)    Decreased range of motion of right wrist    Decreased  strength of right hand    Generalized  weakness    Neuropathy of right radial nerve     Review of patient's allergies indicates:  No Known Allergies    Health Maintenance  Health Maintenance Topics with due status: Not Due       Topic Last Completion Date    Influenza Vaccine 02/10/2020    Colorectal Cancer Screening 03/13/2020    Lipid Panel 05/06/2021     Health Maintenance Due   Topic Date Due    Hepatitis C Screening  Never done    HIV Screening  Never done    TETANUS VACCINE  Never done    Sign Pain Contract  Never done    LDCT Lung Screen  Never done    Shingles Vaccine (1 of 2) Never done    COVID-19 Vaccine (3 - Booster for Pfizer series) 10/18/2021       Medications:  Current Outpatient Medications on File Prior to Visit   Medication Sig Dispense Refill    pantoprazole (PROTONIX) 40 MG tablet Take 1 tablet by mouth once daily 30 tablet 0    traMADoL (ULTRAM) 50 mg tablet Take 1 tablet (50 mg total) by mouth every 8 (eight) hours as needed for Pain. 30 tablet 0     Current Facility-Administered Medications on File Prior to Visit   Medication Dose Route Frequency Provider Last Rate Last Admin    lactated ringers infusion   Intravenous On Call Procedure TABATHA Roger           Medications have been reviewed and reconciled with patient at visit today.    Barriers to medications present (no )    Adverse reactions to current medications (no)    Over the counter medications reviewed (Yes) and if needed added to active Medication list.    Exam:  Vitals:    07/15/22 1028   BP: (!) 152/100   Pulse: 90   Resp: 18     Weight: 113.7 kg (250 lb 10.6 oz)   Body mass index is 32.18 kg/m².      Physical Exam  Vitals reviewed.   Constitutional:       Appearance: He is not ill-appearing.   HENT:      Head: Normocephalic and atraumatic.      Right Ear: External ear normal.      Left Ear: External ear normal.   Eyes:      General: No scleral icterus.        Right eye: No discharge.         Left eye: No discharge.      Pupils: Pupils are equal,  round, and reactive to light.   Neck:      Thyroid: No thyromegaly.   Cardiovascular:      Rate and Rhythm: Normal rate and regular rhythm.      Heart sounds: Normal heart sounds. No murmur heard.  Pulmonary:      Effort: Pulmonary effort is normal. No respiratory distress.      Breath sounds: Normal breath sounds. No wheezing.   Abdominal:      General: Bowel sounds are normal. There is no distension.      Palpations: Abdomen is soft.      Tenderness: There is no abdominal tenderness.   Musculoskeletal:         General: Normal range of motion.      Cervical back: Normal range of motion and neck supple.   Skin:     General: Skin is warm.      Findings: No erythema or rash.   Neurological:      Mental Status: He is alert and oriented to person, place, and time.      Coordination: Coordination normal.      Deep Tendon Reflexes: Reflexes are normal and symmetric.   Psychiatric:         Behavior: Behavior normal.         Laboratory Reviewed: (Yes)  Lab Results   Component Value Date    WBC 5.84 03/21/2022    HGB 15.1 03/21/2022    HCT 46.3 03/21/2022     03/21/2022    CHOL 246 (H) 05/06/2021    TRIG 176 (H) 05/06/2021    HDL 54 05/06/2021    ALT 67 (H) 03/21/2022    AST 33 03/21/2022     03/21/2022    K 4.5 03/21/2022     03/21/2022    CREATININE 0.9 03/21/2022    BUN 15 03/21/2022    CO2 25 03/21/2022    INR 1.0 12/27/2016       Assessment:  The primary encounter diagnosis was Routine physical examination. Diagnoses of Encounter for hepatitis C screening test for low risk patient, Essential hypertension, and BMI 32.0-32.9,adult were also pertinent to this visit.    Plan:  Routine physical examination  -     Lipid Panel; Future; Expected date: 07/15/2022  -     Comprehensive Metabolic Panel; Future; Expected date: 07/15/2022  -     CBC Auto Differential; Future; Expected date: 07/15/2022    Encounter for hepatitis C screening test for low risk patient  -     Hepatitis C Antibody; Future; Expected  date: 07/15/2022    Essential hypertension  -     amLODIPine (NORVASC) 5 MG tablet; Take 2 tablets (10 mg total) by mouth once daily.  Dispense: 270 tablet; Refill: 1    BMI 32.0-32.9,adult  -     diethylpropion (TENUATE) 75 mg SR tablet; Take 1 tablet (75 mg total) by mouth every morning.  Dispense: 30 tablet; Refill: 0      -Patient's lab results were reviewed and discussed with patient  -Treatment options and alternatives were discussed with the patient. Patient expressed understanding. Patient was given the opportunity to ask questions and be an active participant in their medical care. Patient had no further questions or concerns at this time.   -Documentation of patient's health and condition was obtained from family member who was present during visit.  -Patient is an overall moderate risk for health complications from their medical conditions.     Follow up: follow up 6 months       Care Plan/Goals: Reviewed N/A   Goals    None             After visit summary printed and given to patient upon discharge.  Patient goals and care plan are included in After visit summary.

## 2022-07-16 RX ORDER — DIETHYLPROPION HYDROCHLORIDE 75 MG/1
75 TABLET, EXTENDED RELEASE ORAL EVERY MORNING
Qty: 30 TABLET | Refills: 0 | Status: SHIPPED | OUTPATIENT
Start: 2022-07-16 | End: 2022-11-10

## 2022-07-19 ENCOUNTER — PATIENT MESSAGE (OUTPATIENT)
Dept: INTERNAL MEDICINE | Facility: CLINIC | Age: 57
End: 2022-07-19
Payer: COMMERCIAL

## 2022-07-19 ENCOUNTER — LAB VISIT (OUTPATIENT)
Dept: LAB | Facility: HOSPITAL | Age: 57
End: 2022-07-19
Attending: FAMILY MEDICINE
Payer: COMMERCIAL

## 2022-07-19 DIAGNOSIS — Z11.59 ENCOUNTER FOR HEPATITIS C SCREENING TEST FOR LOW RISK PATIENT: ICD-10-CM

## 2022-07-19 DIAGNOSIS — Z00.00 ROUTINE PHYSICAL EXAMINATION: ICD-10-CM

## 2022-07-19 LAB
ALBUMIN SERPL BCP-MCNC: 4.4 G/DL (ref 3.5–5.2)
ALP SERPL-CCNC: 58 U/L (ref 55–135)
ALT SERPL W/O P-5'-P-CCNC: 49 U/L (ref 10–44)
ANION GAP SERPL CALC-SCNC: 12 MMOL/L (ref 8–16)
AST SERPL-CCNC: 35 U/L (ref 10–40)
BASOPHILS # BLD AUTO: 0.05 K/UL (ref 0–0.2)
BASOPHILS NFR BLD: 0.7 % (ref 0–1.9)
BILIRUB SERPL-MCNC: 0.8 MG/DL (ref 0.1–1)
BUN SERPL-MCNC: 15 MG/DL (ref 6–20)
CALCIUM SERPL-MCNC: 9.9 MG/DL (ref 8.7–10.5)
CHLORIDE SERPL-SCNC: 102 MMOL/L (ref 95–110)
CHOLEST SERPL-MCNC: 247 MG/DL (ref 120–199)
CHOLEST/HDLC SERPL: 5 {RATIO} (ref 2–5)
CO2 SERPL-SCNC: 26 MMOL/L (ref 23–29)
CREAT SERPL-MCNC: 0.9 MG/DL (ref 0.5–1.4)
DIFFERENTIAL METHOD: NORMAL
EOSINOPHIL # BLD AUTO: 0.2 K/UL (ref 0–0.5)
EOSINOPHIL NFR BLD: 2.5 % (ref 0–8)
ERYTHROCYTE [DISTWIDTH] IN BLOOD BY AUTOMATED COUNT: 12.5 % (ref 11.5–14.5)
EST. GFR  (AFRICAN AMERICAN): >60 ML/MIN/1.73 M^2
EST. GFR  (NON AFRICAN AMERICAN): >60 ML/MIN/1.73 M^2
GLUCOSE SERPL-MCNC: 114 MG/DL (ref 70–110)
HCT VFR BLD AUTO: 47.8 % (ref 40–54)
HDLC SERPL-MCNC: 49 MG/DL (ref 40–75)
HDLC SERPL: 19.8 % (ref 20–50)
HGB BLD-MCNC: 16.1 G/DL (ref 14–18)
IMM GRANULOCYTES # BLD AUTO: 0.04 K/UL (ref 0–0.04)
IMM GRANULOCYTES NFR BLD AUTO: 0.5 % (ref 0–0.5)
LDLC SERPL CALC-MCNC: 163.4 MG/DL (ref 63–159)
LYMPHOCYTES # BLD AUTO: 2 K/UL (ref 1–4.8)
LYMPHOCYTES NFR BLD: 26.5 % (ref 18–48)
MCH RBC QN AUTO: 29.9 PG (ref 27–31)
MCHC RBC AUTO-ENTMCNC: 33.7 G/DL (ref 32–36)
MCV RBC AUTO: 89 FL (ref 82–98)
MONOCYTES # BLD AUTO: 0.6 K/UL (ref 0.3–1)
MONOCYTES NFR BLD: 8.3 % (ref 4–15)
NEUTROPHILS # BLD AUTO: 4.6 K/UL (ref 1.8–7.7)
NEUTROPHILS NFR BLD: 61.5 % (ref 38–73)
NONHDLC SERPL-MCNC: 198 MG/DL
NRBC BLD-RTO: 0 /100 WBC
PLATELET # BLD AUTO: 254 K/UL (ref 150–450)
PMV BLD AUTO: 11.4 FL (ref 9.2–12.9)
POTASSIUM SERPL-SCNC: 4.8 MMOL/L (ref 3.5–5.1)
PROT SERPL-MCNC: 7.5 G/DL (ref 6–8.4)
RBC # BLD AUTO: 5.38 M/UL (ref 4.6–6.2)
SODIUM SERPL-SCNC: 140 MMOL/L (ref 136–145)
TRIGL SERPL-MCNC: 173 MG/DL (ref 30–150)
WBC # BLD AUTO: 7.5 K/UL (ref 3.9–12.7)

## 2022-07-19 PROCEDURE — 36415 COLL VENOUS BLD VENIPUNCTURE: CPT | Performed by: FAMILY MEDICINE

## 2022-07-19 PROCEDURE — 85025 COMPLETE CBC W/AUTO DIFF WBC: CPT | Performed by: FAMILY MEDICINE

## 2022-07-19 PROCEDURE — 86803 HEPATITIS C AB TEST: CPT | Performed by: FAMILY MEDICINE

## 2022-07-19 PROCEDURE — 80053 COMPREHEN METABOLIC PANEL: CPT | Performed by: FAMILY MEDICINE

## 2022-07-19 PROCEDURE — 80061 LIPID PANEL: CPT | Performed by: FAMILY MEDICINE

## 2022-07-20 DIAGNOSIS — K21.9 GASTROESOPHAGEAL REFLUX DISEASE WITHOUT ESOPHAGITIS: ICD-10-CM

## 2022-07-20 LAB — HCV AB SERPL QL IA: NEGATIVE

## 2022-07-20 RX ORDER — PANTOPRAZOLE SODIUM 40 MG/1
TABLET, DELAYED RELEASE ORAL
Qty: 90 TABLET | Refills: 3 | Status: SHIPPED | OUTPATIENT
Start: 2022-07-20 | End: 2022-12-28 | Stop reason: SDUPTHER

## 2022-07-21 NOTE — TELEPHONE ENCOUNTER
No new care gaps identified.  [facility] Embedded Care Gaps. Reference number: [id number]. 7/20/2022   7:52:47 PM CDT

## 2022-07-21 NOTE — TELEPHONE ENCOUNTER
Refill Decision Note   Dimas Darden  is requesting a refill authorization.  Brief Assessment and Rationale for Refill:  Approve     Medication Therapy Plan:       Medication Reconciliation Completed: No   Comments:     No Care Gaps recommended.     Note composed:8:35 PM 07/20/2022

## 2022-07-26 ENCOUNTER — PATIENT MESSAGE (OUTPATIENT)
Dept: REHABILITATION | Facility: HOSPITAL | Age: 57
End: 2022-07-26
Payer: COMMERCIAL

## 2022-07-28 ENCOUNTER — DOCUMENTATION ONLY (OUTPATIENT)
Dept: REHABILITATION | Facility: HOSPITAL | Age: 57
End: 2022-07-28
Payer: COMMERCIAL

## 2022-07-28 PROBLEM — M25.631 DECREASED RANGE OF MOTION OF RIGHT WRIST: Status: RESOLVED | Noted: 2022-05-02 | Resolved: 2022-07-28

## 2022-07-28 PROBLEM — R53.1 GENERALIZED WEAKNESS: Status: RESOLVED | Noted: 2022-05-02 | Resolved: 2022-07-28

## 2022-07-28 PROBLEM — R29.898 DECREASED GRIP STRENGTH OF RIGHT HAND: Status: RESOLVED | Noted: 2022-05-02 | Resolved: 2022-07-28

## 2022-07-28 NOTE — PROGRESS NOTES
[facility]  OT Discharge Note    Name: Dimas Darden  Clinic Number: [id number]    Therapy Diagnosis:        Encounter Diagnoses   Name Primary?    Decreased range of motion of right wrist Yes    Decreased  strength of right hand      Generalized weakness        Physician: Julia Quintana, PA    Physician Orders: OT eval and tx  Medical Diagnosis: S46.211D (ICD-10-CM) - Traumatic rupture of right distal biceps tendon, subsequent encounter  Evaluation Date: 5/2/2022    Date of Last visit: 6/29/2022  Total Visits Received: 12    ASSESSMENT      Pt was making good progress with therapy, but would have benefited from further treatment. His plan of care was updated last tx session and new orders were received from MD. However, authorization for more visits never received from worker's comp case.    Discharge reason: Patient has completed allowable visits authorized by insurance    Discharge FOTO Score: 50%    Goals:   Original Short Term Goals: (4 weeks)  1. Pt will be independent with HEP in 2 visits. - MET 5/9/2022  2. Pt will report decreased occurrences of R elbow intermittent shooting and aching pain. - MET 5/13/2022  3. Pt will increase R forearm sup AROM by 8-10 degrees to enable dressing, grooming activities. - progressing, continue goal 6/29/2022  4. Pt will increase R wrist flex/ext AROM by 10 degrees to enable dressing, grooming activities. - MET 6/29/2022     New Short Term Goals: (4 weeks - set on 6/29/2022)  1. Pt will exhibit decreased right wrist drop with ulnar deviation.  2. Pt will be able to actively extend right ring and small finger metacarpal joints and lift fingers off tabletop, needed for typing.     Original Long Term Goals: (8 weeks)  1. Pt will report decreased pain to 1-2/10 with ADLs. - MET 5/18/2022  2. Pt will exhibit 70-80 degrees of R supination AROM to enable independence with self-care and meal preparation. - progressing, continue goal  6/29/2022  3. Pt will exhibit 65-75 degrees of R wrist flexion/extension AROM to enable independence with self-care and meal preparation. - MET 6/29/2022  4. Pt will exhibit 70-80# of R  strength to allow a firm grasp on cooking utensils, steering wheel, etc. - MET 6/29/2022  5. Pt will exhibit a decrease in FOTO limitation score of <40% which would indicate an improvement in quality of life. - previously met on 5/18/2022, not met on 6/29/2022 - continue goal  6. 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 - progressing, continue goal 6/29/2022    PLAN   This patient is discharged from Occupational Therapy      [facility]

## 2022-08-22 ENCOUNTER — PATIENT MESSAGE (OUTPATIENT)
Dept: INTERNAL MEDICINE | Facility: CLINIC | Age: 57
End: 2022-08-22
Payer: COMMERCIAL

## 2022-08-23 ENCOUNTER — PATIENT MESSAGE (OUTPATIENT)
Dept: INTERNAL MEDICINE | Facility: CLINIC | Age: 57
End: 2022-08-23
Payer: COMMERCIAL

## 2022-08-23 RX ORDER — METRONIDAZOLE 500 MG/1
500 TABLET ORAL EVERY 12 HOURS
Qty: 20 TABLET | Refills: 0 | Status: SHIPPED | OUTPATIENT
Start: 2022-08-23 | End: 2023-06-28 | Stop reason: SDUPTHER

## 2022-08-23 RX ORDER — CIPROFLOXACIN 500 MG/1
500 TABLET ORAL EVERY 8 HOURS
Qty: 30 TABLET | Refills: 0 | Status: SHIPPED | OUTPATIENT
Start: 2022-08-23 | End: 2023-06-28 | Stop reason: SDUPTHER

## 2022-09-12 NOTE — LETTER
February 11, 2020      Angela Payne MD  [address] [place] [address] [address] [id number]           [facility]  [address] [facility]  [place] [address]  Phone: [phone]  Fax: [phone]          Patient: Dimas Darden   MR Number: [id number]   YOB: 1965   Date of Visit: 2/11/2020       Dear Dr. Angela Payne:    Thank you for referring Dimas Darden to me for evaluation. Attached you will find relevant portions of my assessment and plan of care.    If you have questions, please do not hesitate to call me. I look forward to following Dimas Darden along with you.    Sincerely,    Lena Douglass MD    Enclosure  CC:  No Recipients    If you would like to receive this communication electronically, please contact [email or web address] or [phone] to request more information on [email or web address] Link access.    For providers and/or their staff who would like to refer a patient to [facility], please contact us through our one-stop-shop provider referral line, [facility], at [phone].    If you feel you have received this communication in error or would no longer like to receive these types of communications, please e-mail [email or web address]         
Re-do Aortic Valve Replacement on 9/14/22  Pre-op instructions, including Chlorhexidine soap, provided - all questions answered  Labs done at [facility] swab at [facility]

## 2022-10-27 ENCOUNTER — OFFICE VISIT (OUTPATIENT)
Dept: INTERNAL MEDICINE | Facility: CLINIC | Age: 57
End: 2022-10-27
Payer: COMMERCIAL

## 2022-10-27 DIAGNOSIS — R39.15 URINARY URGENCY: Primary | ICD-10-CM

## 2022-10-27 DIAGNOSIS — R14.0 BLOATING SYMPTOM: ICD-10-CM

## 2022-10-27 DIAGNOSIS — R25.2 MUSCLE CRAMPS: ICD-10-CM

## 2022-10-27 PROCEDURE — 99213 OFFICE O/P EST LOW 20 MIN: CPT | Mod: 95,,, | Performed by: FAMILY MEDICINE

## 2022-10-27 PROCEDURE — 99213 PR OFFICE/OUTPT VISIT, EST, LEVL III, 20-29 MIN: ICD-10-PCS | Mod: 95,,, | Performed by: FAMILY MEDICINE

## 2022-10-27 NOTE — PROGRESS NOTES
The patient location is: [place] ([facility])  The chief complaint leading to consultation is: follow up     Visit type: audiovisual    Face to Face time with patient: 16 minutes  16 minutes of total time spent on the encounter, which includes face to face time and non-face to face time preparing to see the patient (eg, review of tests), Obtaining and/or reviewing separately obtained history, Documenting clinical information in the electronic or other health record, Independently interpreting results (not separately reported) and communicating results to the patient/family/caregiver, or Care coordination (not separately reported).       Each patient to whom he or she provides medical services by telemedicine is:  (1) informed of the relationship between the physician and patient and the respective role of any other health care provider with respect to management of the patient; and (2) notified that he or she may decline to receive medical services by telemedicine and may withdraw from such care at any time.    Subjective:       Patient ID: Dimas Darden is a 56 y.o. male.    Chief Complaint: UTI concerns       Mr. Darden presents today for possible UTI     Abdominal Pain  This is a recurrent problem. The current episode started more than 1 month ago. The onset quality is gradual. The problem occurs daily. The most recent episode lasted 5 Days. The problem has been waxing and waning. The pain is located in the suprapubic region. The pain is at a severity of 4/10. The pain is mild. The quality of the pain is cramping. The abdominal pain radiates to the periumbilical region. Associated symptoms include belching, flatus and myalgias. Pertinent negatives include no anorexia, arthralgias, constipation, diarrhea, dysuria, fever, frequency, headaches, hematochezia, hematuria, melena, nausea, vomiting or weight loss. The pain is aggravated by certain positions. The pain is relieved by Bowel movements, certain positions,  movement, palpation and passing flatus. He has tried antacids and antibiotics for the symptoms. The treatment provided mild relief. Prior diagnostic workup includes surgery. His past medical history is significant for abdominal surgery and GERD. There is no history of colon cancer, Crohn's disease, gallstones, irritable bowel syndrome, pancreatitis, PUD or ulcerative colitis. Patient's medical history does not include kidney stones and UTI.    Bladder feels full  Urgency     Bowels normal     Low back pain     Body cramps in torso   Pending how he may twist gets a cramp under the ribs     Drinking more water     Started back with the weight loss pills     GI follow up after 1 year /surgery     Review of Systems   Constitutional:  Negative for fever and weight loss.   Gastrointestinal:  Positive for abdominal pain and flatus. Negative for anorexia, constipation, diarrhea, hematochezia, melena, nausea and vomiting.   Genitourinary:  Negative for dysuria, frequency and hematuria.   Musculoskeletal:  Positive for back pain and myalgias. Negative for arthralgias.   Neurological:  Negative for headaches.         Past Medical History:   Diagnosis Date    Back pain     Digestive disorder     Disc     Bulging disc in back    Diverticulitis     Hypertension      Past Surgical History:   Procedure Laterality Date    COLONOSCOPY N/A 3/13/2020    Procedure: COLONOSCOPY;  Surgeon: Mat Guerrero MD;  Location: [facility];  Service: General;  Laterality: N/A;    ESOPHAGOGASTRODUODENOSCOPY N/A 4/15/2021    Procedure: EGD (ESOPHAGOGASTRODUODENOSCOPY) needs rapid COVID;  Surgeon: Josie Livingston MD;  Location: [facility] END[facility];  Service: Endoscopy;  Laterality: N/A;    INJECTION OF ANESTHETIC AGENT INTO TISSUE PLANE DEFINED BY TRANSVERSUS ABDOMINIS MUSCLE N/A 6/8/2021    Procedure: BLOCK, TRANSVERSUS ABDOMINIS PLANE;  Surgeon: Mat Guerrero MD;  Location: [facility] OR;  Service: General;  Laterality: N/A;    LAPAROSCOPIC  SIGMOIDECTOMY N/A 6/8/2021    Procedure: COLECTOMY, SIGMOID, LAPAROSCOPIC;  Surgeon: Mat Guerrero MD;  Location: [facility] OR;  Service: General;  Laterality: N/A;    MOBILIZATION OF SPLENIC FLEXURE N/A 6/8/2021    Procedure: MOBILIZATION, SPLENIC FLEXURE;  Surgeon: Mat Guerrero MD;  Location: [facility] OR;  Service: General;  Laterality: N/A;  Laparoscopic    UNDESCENDED TESTICLE EXPLORATION Left     Age 15     Family History   Problem Relation Age of Onset    Diverticulitis Mother     No Known Problems Father     No Known Problems Sister     No Known Problems Brother     No Known Problems Brother      Social History     Socioeconomic History    Marital status:    Tobacco Use    Smoking status: Former     Packs/day: 1.00     Years: 25.00     Pack years: 25.00     Types: Cigarettes     Quit date: 4/10/2012     Years since quitting: 10.5    Smokeless tobacco: Never   Substance and Sexual Activity    Alcohol use: Yes     Alcohol/week: 2.0 standard drinks     Types: 2 Glasses of wine per week     Comment: Socially on the weekend    Drug use: No     Social Determinants of Health     Financial Resource Strain: Low Risk     Difficulty of Paying Living Expenses: Not very hard   Food Insecurity: No Food Insecurity    Worried About Running Out of Food in the Last Year: Never true    Ran Out of Food in the Last Year: Never true   Transportation Needs: No Transportation Needs    Lack of Transportation (Medical): No    Lack of Transportation (Non-Medical): No   Physical Activity: Insufficiently Active    Days of Exercise per Week: 3 days    Minutes of Exercise per Session: 30 min   Stress: Stress Concern Present    Feeling of Stress : To some extent   Social Connections: Unknown    Frequency of Communication with Friends and Family: Never    Frequency of Social Gatherings with Friends and Family: Once a week    Active Member of Clubs or Organizations: No    Attends Club or Organization Meetings: Never    Marital  Status:    Housing Stability: Low Risk     Unable to Pay for Housing in the Last Year: No    Number of Places Lived in the Last Year: 1    Unstable Housing in the Last Year: No       Objective:        Physical Exam  Constitutional:       Appearance: Normal appearance.   HENT:      Head: Atraumatic.   Pulmonary:      Effort: Pulmonary effort is normal.   Neurological:      Mental Status: He is alert.         Results for orders placed or performed in visit on 07/19/22   Lipid Panel   Result Value Ref Range    Cholesterol 247 (H) 120 - 199 mg/dL    Triglycerides 173 (H) 30 - 150 mg/dL    HDL 49 40 - 75 mg/dL    LDL Cholesterol 163.4 (H) 63.0 - 159.0 mg/dL    HDL/Cholesterol Ratio 19.8 (L) 20.0 - 50.0 %    Total Cholesterol/HDL Ratio 5.0 2.0 - 5.0    Non-HDL Cholesterol 198 mg/dL   Comprehensive Metabolic Panel   Result Value Ref Range    Sodium 140 136 - 145 mmol/L    Potassium 4.8 3.5 - 5.1 mmol/L    Chloride 102 95 - 110 mmol/L    CO2 26 23 - 29 mmol/L    Glucose 114 (H) 70 - 110 mg/dL    BUN 15 6 - 20 mg/dL    Creatinine 0.9 0.5 - 1.4 mg/dL    Calcium 9.9 8.7 - 10.5 mg/dL    Total Protein 7.5 6.0 - 8.4 g/dL    Albumin 4.4 3.5 - 5.2 g/dL    Total Bilirubin 0.8 0.1 - 1.0 mg/dL    Alkaline Phosphatase 58 55 - 135 U/L    AST 35 10 - 40 U/L    ALT 49 (H) 10 - 44 U/L    Anion Gap 12 8 - 16 mmol/L    eGFR if African American >60.0 >60 mL/min/1.73 m^2    eGFR if non African American >60.0 >60 mL/min/1.73 m^2   CBC Auto Differential   Result Value Ref Range    WBC 7.50 3.90 - 12.70 K/uL    RBC 5.38 4.60 - 6.20 M/uL    Hemoglobin 16.1 14.0 - 18.0 g/dL    Hematocrit 47.8 40.0 - 54.0 %    MCV 89 82 - 98 fL    MCH 29.9 27.0 - 31.0 pg    MCHC 33.7 32.0 - 36.0 g/dL    RDW 12.5 11.5 - 14.5 %    Platelets 254 150 - 450 K/uL    MPV 11.4 9.2 - 12.9 fL    Immature Granulocytes 0.5 0.0 - 0.5 %    Gran # (ANC) 4.6 1.8 - 7.7 K/uL    Immature Grans (Abs) 0.04 0.00 - 0.04 K/uL    Lymph # 2.0 1.0 - 4.8 K/uL    Mono # 0.6 0.3 - 1.0  K/uL    Eos # 0.2 0.0 - 0.5 K/uL    Baso # 0.05 0.00 - 0.20 K/uL    nRBC 0 0 /100 WBC    Gran % 61.5 38.0 - 73.0 %    Lymph % 26.5 18.0 - 48.0 %    Mono % 8.3 4.0 - 15.0 %    Eosinophil % 2.5 0.0 - 8.0 %    Basophil % 0.7 0.0 - 1.9 %    Differential Method Automated    Hepatitis C Antibody   Result Value Ref Range    Hepatitis C Ab Negative Negative       Assessment/Plan:     Urinary urgency  -     Urinalysis, Reflex to Urine Culture Urine, Clean Catch; Future; Expected date: 10/27/2022    Muscle cramps  -     Magnesium; Future; Expected date: 10/27/2022  -     COMPREHENSIVE METABOLIC PANEL; Future; Expected date: 10/27/2022    Bloating symptom  -     X-Ray Abdomen Flat And Erect; Future; Expected date: 10/27/2022        Follow up as needed  6 months for pain medication     Angela Payne MD  [facility]   Family Medicine

## 2022-10-28 ENCOUNTER — HOSPITAL ENCOUNTER (OUTPATIENT)
Dept: RADIOLOGY | Facility: HOSPITAL | Age: 57
Discharge: HOME | End: 2022-10-28
Attending: FAMILY MEDICINE
Payer: COMMERCIAL

## 2022-10-28 ENCOUNTER — PATIENT MESSAGE (OUTPATIENT)
Dept: INTERNAL MEDICINE | Facility: CLINIC | Age: 57
End: 2022-10-28
Payer: COMMERCIAL

## 2022-10-28 DIAGNOSIS — R14.0 BLOATING SYMPTOM: ICD-10-CM

## 2022-10-28 PROCEDURE — 74019 RADEX ABDOMEN 2 VIEWS: CPT | Mod: 26,,, | Performed by: RADIOLOGY

## 2022-10-28 PROCEDURE — 74019 XR ABDOMEN FLAT AND ERECT: ICD-10-PCS | Mod: 26,,, | Performed by: RADIOLOGY

## 2022-10-28 PROCEDURE — 74019 RADEX ABDOMEN 2 VIEWS: CPT | Mod: TC

## 2022-11-09 ENCOUNTER — PATIENT MESSAGE (OUTPATIENT)
Dept: INTERNAL MEDICINE | Facility: CLINIC | Age: 57
End: 2022-11-09
Payer: COMMERCIAL

## 2022-11-10 ENCOUNTER — PATIENT MESSAGE (OUTPATIENT)
Dept: INTERNAL MEDICINE | Facility: CLINIC | Age: 57
End: 2022-11-10
Payer: COMMERCIAL

## 2022-11-10 RX ORDER — PHENTERMINE HYDROCHLORIDE 37.5 MG/1
37.5 TABLET ORAL
Qty: 30 TABLET | Refills: 0 | Status: SHIPPED | OUTPATIENT
Start: 2022-11-10 | End: 2022-12-10

## 2022-11-28 ENCOUNTER — PATIENT OUTREACH (OUTPATIENT)
Dept: ADMINISTRATIVE | Facility: HOSPITAL | Age: 57
End: 2022-11-28
Payer: COMMERCIAL

## 2022-11-28 NOTE — PROGRESS NOTES
HTN Report:  Attempted to contact pt in regards to obtaining remote bp, and schedule follow up appt with Dr Yolanda Payne no ans, lvm.

## 2022-12-21 ENCOUNTER — PATIENT MESSAGE (OUTPATIENT)
Dept: INTERNAL MEDICINE | Facility: CLINIC | Age: 57
End: 2022-12-21
Payer: COMMERCIAL

## 2023-01-27 ENCOUNTER — PATIENT MESSAGE (OUTPATIENT)
Dept: INTERNAL MEDICINE | Facility: CLINIC | Age: 58
End: 2023-01-27
Payer: COMMERCIAL

## 2023-01-28 NOTE — TELEPHONE ENCOUNTER
He needs to schedule an appt I see patients every 4 weeks on this medication for refills and only give 3 in a row prior to stopping for a while  
LOV 01/11/19  
Xray Wrist 3 Views, Right

## 2023-02-06 ENCOUNTER — PATIENT OUTREACH (OUTPATIENT)
Dept: ADMINISTRATIVE | Facility: HOSPITAL | Age: 58
End: 2023-02-06
Payer: COMMERCIAL

## 2023-04-06 ENCOUNTER — PATIENT MESSAGE (OUTPATIENT)
Dept: ADMINISTRATIVE | Facility: HOSPITAL | Age: 58
End: 2023-04-06
Payer: COMMERCIAL

## 2023-04-06 ENCOUNTER — PATIENT OUTREACH (OUTPATIENT)
Dept: ADMINISTRATIVE | Facility: HOSPITAL | Age: 58
End: 2023-04-06
Payer: COMMERCIAL

## 2023-04-06 NOTE — PROGRESS NOTES
HTN REPORT: Called and spoke with patient. He stated that his wife takes his blood pressure for him and all she tells him is that it is normal. He will have her take a reading tonight and send to me through portal.

## 2023-04-10 ENCOUNTER — TELEPHONE (OUTPATIENT)
Dept: INTERNAL MEDICINE | Facility: CLINIC | Age: 58
End: 2023-04-10
Payer: COMMERCIAL

## 2023-04-10 VITALS — DIASTOLIC BLOOD PRESSURE: 80 MMHG | SYSTOLIC BLOOD PRESSURE: 130 MMHG

## 2023-05-11 DIAGNOSIS — K21.9 GASTROESOPHAGEAL REFLUX DISEASE WITHOUT ESOPHAGITIS: ICD-10-CM

## 2023-05-11 RX ORDER — PANTOPRAZOLE SODIUM 40 MG/1
40 TABLET, DELAYED RELEASE ORAL DAILY
Qty: 90 TABLET | Refills: 1 | Status: SHIPPED | OUTPATIENT
Start: 2023-05-11 | End: 2023-06-28 | Stop reason: SDUPTHER

## 2023-05-11 NOTE — TELEPHONE ENCOUNTER
No care due was identified.  [facility] Embedded Care Due Messages. Reference number: [id number].   5/11/2023 7:49:50 AM CDT

## 2023-05-11 NOTE — TELEPHONE ENCOUNTER
Refill Decision Note   Dimas Darden  is requesting a refill authorization.  Brief Assessment and Rationale for Refill:  Approve     Medication Therapy Plan:       Medication Reconciliation Completed: No   Comments:     No Care Gaps recommended.     Note composed:10:40 AM 05/11/2023

## 2023-05-20 DIAGNOSIS — M54.50 CHRONIC LOW BACK PAIN WITHOUT SCIATICA, UNSPECIFIED BACK PAIN LATERALITY: ICD-10-CM

## 2023-05-20 DIAGNOSIS — G89.29 CHRONIC LOW BACK PAIN WITHOUT SCIATICA, UNSPECIFIED BACK PAIN LATERALITY: ICD-10-CM

## 2023-05-20 NOTE — TELEPHONE ENCOUNTER
No care due was identified.  [facility] Embedded Care Due Messages. Reference number: [id number].   5/20/2023 8:55:28 AM CDT

## 2023-05-22 RX ORDER — TRAMADOL HYDROCHLORIDE 50 MG/1
50 TABLET ORAL EVERY 8 HOURS PRN
Qty: 30 TABLET | Refills: 5 | Status: SHIPPED | OUTPATIENT
Start: 2023-05-22

## 2023-05-31 ENCOUNTER — PATIENT MESSAGE (OUTPATIENT)
Dept: INTERNAL MEDICINE | Facility: CLINIC | Age: 58
End: 2023-05-31
Payer: COMMERCIAL

## 2023-06-28 ENCOUNTER — TELEPHONE (OUTPATIENT)
Dept: INTERNAL MEDICINE | Facility: CLINIC | Age: 58
End: 2023-06-28
Payer: COMMERCIAL

## 2023-06-28 ENCOUNTER — OFFICE VISIT (OUTPATIENT)
Dept: INTERNAL MEDICINE | Facility: CLINIC | Age: 58
End: 2023-06-28
Payer: COMMERCIAL

## 2023-06-28 DIAGNOSIS — K21.9 GASTROESOPHAGEAL REFLUX DISEASE WITHOUT ESOPHAGITIS: ICD-10-CM

## 2023-06-28 DIAGNOSIS — M54.50 ACUTE RIGHT-SIDED LOW BACK PAIN WITHOUT SCIATICA: Primary | ICD-10-CM

## 2023-06-28 DIAGNOSIS — I10 ESSENTIAL HYPERTENSION: ICD-10-CM

## 2023-06-28 PROCEDURE — 99213 OFFICE O/P EST LOW 20 MIN: CPT | Mod: 95,,, | Performed by: FAMILY MEDICINE

## 2023-06-28 PROCEDURE — 99213 PR OFFICE/OUTPT VISIT, EST, LEVL III, 20-29 MIN: ICD-10-PCS | Mod: 95,,, | Performed by: FAMILY MEDICINE

## 2023-06-28 PROCEDURE — 3075F SYST BP GE 130 - 139MM HG: CPT | Mod: CPTII,95,, | Performed by: FAMILY MEDICINE

## 2023-06-28 PROCEDURE — 3075F PR MOST RECENT SYSTOLIC BLOOD PRESS GE 130-139MM HG: ICD-10-PCS | Mod: CPTII,95,, | Performed by: FAMILY MEDICINE

## 2023-06-28 PROCEDURE — 3079F PR MOST RECENT DIASTOLIC BLOOD PRESSURE 80-89 MM HG: ICD-10-PCS | Mod: CPTII,95,, | Performed by: FAMILY MEDICINE

## 2023-06-28 PROCEDURE — 1159F PR MEDICATION LIST DOCUMENTED IN MEDICAL RECORD: ICD-10-PCS | Mod: CPTII,95,, | Performed by: FAMILY MEDICINE

## 2023-06-28 PROCEDURE — 1160F PR REVIEW ALL MEDS BY PRESCRIBER/CLIN PHARMACIST DOCUMENTED: ICD-10-PCS | Mod: CPTII,95,, | Performed by: FAMILY MEDICINE

## 2023-06-28 PROCEDURE — 1160F RVW MEDS BY RX/DR IN RCRD: CPT | Mod: CPTII,95,, | Performed by: FAMILY MEDICINE

## 2023-06-28 PROCEDURE — 3079F DIAST BP 80-89 MM HG: CPT | Mod: CPTII,95,, | Performed by: FAMILY MEDICINE

## 2023-06-28 PROCEDURE — 1159F MED LIST DOCD IN RCRD: CPT | Mod: CPTII,95,, | Performed by: FAMILY MEDICINE

## 2023-06-28 RX ORDER — METRONIDAZOLE 500 MG/1
500 TABLET ORAL EVERY 12 HOURS
Qty: 20 TABLET | Refills: 0 | Status: SHIPPED | OUTPATIENT
Start: 2023-06-28

## 2023-06-28 RX ORDER — TIZANIDINE 2 MG/1
2 TABLET ORAL EVERY 8 HOURS PRN
Qty: 24 TABLET | Refills: 1 | Status: SHIPPED | OUTPATIENT
Start: 2023-06-28 | End: 2023-07-14

## 2023-06-28 RX ORDER — CIPROFLOXACIN 500 MG/1
500 TABLET ORAL EVERY 8 HOURS
Qty: 30 TABLET | Refills: 0 | Status: SHIPPED | OUTPATIENT
Start: 2023-06-28

## 2023-06-28 RX ORDER — PANTOPRAZOLE SODIUM 40 MG/1
40 TABLET, DELAYED RELEASE ORAL DAILY
Qty: 90 TABLET | Refills: 1 | Status: SHIPPED | OUTPATIENT
Start: 2023-06-28

## 2023-06-28 RX ORDER — AMLODIPINE BESYLATE 5 MG/1
10 TABLET ORAL DAILY
Qty: 180 TABLET | Refills: 1 | Status: SHIPPED | OUTPATIENT
Start: 2023-06-28

## 2023-06-28 NOTE — TELEPHONE ENCOUNTER
----- Message from Juma Carroll sent at 6/28/2023  3:15 PM CDT -----  Contact: [facility]  [facility] is calling to speak with the nurse in regards to medication reaction between tiZANidine (ZANAFLEX) 2 MG tablet and ciprofloxacin HCl (CIPRO) 500 MG tablet. Please give [facility] a callback at [phone].

## 2023-06-28 NOTE — PROGRESS NOTES
The patient location is: [place] (home)  The chief complaint leading to consultation is: med refill, back pain    Visit type: audiovisual    Face to Face time with patient: 16 minutes  16 minutes minutes of total time spent on the encounter, which includes face to face time and non-face to face time preparing to see the patient (eg, review of tests), Obtaining and/or reviewing separately obtained history, Documenting clinical information in the electronic or other health record, Independently interpreting results (not separately reported) and communicating results to the patient/family/caregiver, or Care coordination (not separately reported).         Each patient to whom he or she provides medical services by telemedicine is:  (1) informed of the relationship between the physician and patient and the respective role of any other health care provider with respect to management of the patient; and (2) notified that he or she may decline to receive medical services by telemedicine and may withdraw from such care at any time.    Subjective:       Patient ID: Dimas Darden is a 57 y.o. male.    Chief Complaint: No chief complaint on file.    Back Pain  This is a recurrent problem. The current episode started more than 1 month ago. The problem occurs constantly. The problem has been waxing and waning since onset. The pain is present in the thoracic spine. The quality of the pain is described as aching. The pain does not radiate. The pain is moderate. The pain is The same all the time. The symptoms are aggravated by bending, coughing, position, sitting, standing, stress and twisting. Stiffness is present All day. Pertinent negatives include no abdominal pain, bladder incontinence, bowel incontinence, chest pain, dysuria, fever, headaches, leg pain, numbness, paresis, paresthesias, pelvic pain, perianal numbness, tingling, weakness or weight loss. The treatment provided no relief.   Back pain   Parallel  spine    Bilateral   Left improved   Right still present   Under ribs down   Saw a chiropractor and this helped but pain has returned after a week    The pain has been present over a month     Review of Systems   Constitutional:  Negative for fever and weight loss.   Cardiovascular:  Negative for chest pain.   Gastrointestinal:  Negative for abdominal pain and bowel incontinence.   Genitourinary:  Negative for bladder incontinence, dysuria, hematuria and pelvic pain.   Musculoskeletal:  Positive for back pain.   Neurological:  Negative for tingling, weakness, numbness, headaches and paresthesias.         Past Medical History:   Diagnosis Date    Back pain     Digestive disorder     Disc     Bulging disc in back    Diverticulitis     Hypertension      Past Surgical History:   Procedure Laterality Date    COLONOSCOPY N/A 3/13/2020    Procedure: COLONOSCOPY;  Surgeon: Mat Guerrero MD;  Location: [facility] END[facility];  Service: General;  Laterality: N/A;    ESOPHAGOGASTRODUODENOSCOPY N/A 4/15/2021    Procedure: EGD (ESOPHAGOGASTRODUODENOSCOPY) needs rapid COVID;  Surgeon: Josie Livingston MD;  Location: [facility] ENDO;  Service: Endoscopy;  Laterality: N/A;    INJECTION OF ANESTHETIC AGENT INTO TISSUE PLANE DEFINED BY TRANSVERSUS ABDOMINIS MUSCLE N/A 6/8/2021    Procedure: BLOCK, TRANSVERSUS ABDOMINIS PLANE;  Surgeon: Mat Guerrero MD;  Location: [facility] OR;  Service: General;  Laterality: N/A;    LAPAROSCOPIC SIGMOIDECTOMY N/A 6/8/2021    Procedure: COLECTOMY, SIGMOID, LAPAROSCOPIC;  Surgeon: Mat Guerrero MD;  Location: [facility] OR;  Service: General;  Laterality: N/A;    MOBILIZATION OF SPLENIC FLEXURE N/A 6/8/2021    Procedure: MOBILIZATION, SPLENIC FLEXURE;  Surgeon: Mat Guerrero MD;  Location: [facility] OR;  Service: General;  Laterality: N/A;  Laparoscopic    UNDESCENDED TESTICLE EXPLORATION Left     Age 15     Family History   Problem Relation Age of Onset    Diverticulitis Mother     No Known Problems Father      No Known Problems Sister     No Known Problems Brother     No Known Problems Brother      Social History     Socioeconomic History    Marital status:    Tobacco Use    Smoking status: Former     Packs/day: 1.00     Years: 25.00     Pack years: 25.00     Types: Cigarettes     Quit date: 4/10/2012     Years since quittin.2    Smokeless tobacco: Never   Substance and Sexual Activity    Alcohol use: Yes     Alcohol/week: 2.0 standard drinks     Types: 2 Glasses of wine per week     Comment: Socially on the weekend    Drug use: No     Social Determinants of Health     Financial Resource Strain: Low Risk     Difficulty of Paying Living Expenses: Not hard at all   Food Insecurity: No Food Insecurity    Worried About Running Out of Food in the Last Year: Never true    Ran Out of Food in the Last Year: Never true   Transportation Needs: No Transportation Needs    Lack of Transportation (Medical): No    Lack of Transportation (Non-Medical): No   Physical Activity: Insufficiently Active    Days of Exercise per Week: 3 days    Minutes of Exercise per Session: 30 min   Stress: No Stress Concern Present    Feeling of Stress : Only a little   Social Connections: Unknown    Frequency of Communication with Friends and Family: More than three times a week    Frequency of Social Gatherings with Friends and Family: Twice a week    Active Member of Clubs or Organizations: No    Attends Club or Organization Meetings: Never    Marital Status:    Housing Stability: Low Risk     Unable to Pay for Housing in the Last Year: No    Number of Places Lived in the Last Year: 1    Unstable Housing in the Last Year: No       Objective:        Physical Exam  Vitals reviewed.   Constitutional:       Appearance: Normal appearance.   Pulmonary:      Effort: Pulmonary effort is normal.   Neurological:      Mental Status: He is alert.         Results for orders placed or performed in visit on 10/28/22   Magnesium   Result Value Ref  Range    Magnesium 1.9 1.6 - 2.6 mg/dL   COMPREHENSIVE METABOLIC PANEL   Result Value Ref Range    Sodium 138 136 - 145 mmol/L    Potassium 4.3 3.5 - 5.1 mmol/L    Chloride 102 95 - 110 mmol/L    CO2 21 (L) 23 - 29 mmol/L    Glucose 115 (H) 70 - 110 mg/dL    BUN 20 6 - 20 mg/dL    Creatinine 0.9 0.5 - 1.4 mg/dL    Calcium 9.6 8.7 - 10.5 mg/dL    Total Protein 7.4 6.0 - 8.4 g/dL    Albumin 4.3 3.5 - 5.2 g/dL    Total Bilirubin 0.4 0.1 - 1.0 mg/dL    Alkaline Phosphatase 59 55 - 135 U/L    AST 40 10 - 40 U/L    ALT 66 (H) 10 - 44 U/L    Anion Gap 15 8 - 16 mmol/L    eGFR >60.0 >60 mL/min/1.73 m^2       Assessment/Plan:     Acute right-sided low back pain without sciatica  -     tiZANidine (ZANAFLEX) 2 MG tablet; Take 1 tablet (2 mg total) by mouth every 8 (eight) hours as needed (pain).  Dispense: 24 tablet; Refill: 1    Gastroesophageal reflux disease without esophagitis  -     pantoprazole (PROTONIX) 40 MG tablet; Take 1 tablet (40 mg total) by mouth once daily.  Dispense: 90 tablet; Refill: 1    Essential hypertension  -     amLODIPine (NORVASC) 5 MG tablet; Take 2 tablets (10 mg total) by mouth once daily.  Dispense: 180 tablet; Refill: 1    Other orders  -     metroNIDAZOLE (FLAGYL) 500 MG tablet; Take 1 tablet (500 mg total) by mouth every 12 (twelve) hours.  Dispense: 20 tablet; Refill: 0  -     ciprofloxacin HCl (CIPRO) 500 MG tablet; Take 1 tablet (500 mg total) by mouth every 8 (eight) hours.  Dispense: 30 tablet; Refill: 0          Follow up as needed    Angela Payne MD  [facility]   Family Medicine

## 2023-06-28 NOTE — TELEPHONE ENCOUNTER
Spoke with pharmacist who stated cipro and tizanidine cause interaction. Please advise on your recommendation as he does not want to give together.

## 2023-06-29 ENCOUNTER — TELEPHONE (OUTPATIENT)
Dept: INTERNAL MEDICINE | Facility: CLINIC | Age: 58
End: 2023-06-29
Payer: COMMERCIAL

## 2023-06-29 NOTE — TELEPHONE ENCOUNTER
Returned call to pharmacy advised per Dr Adame orders. Zanaflex for now, Cipro incase of flare up. Patient advised not to take medication together. Pharmacy stated will also put direction in notes.

## 2023-06-30 VITALS — SYSTOLIC BLOOD PRESSURE: 130 MMHG | DIASTOLIC BLOOD PRESSURE: 80 MMHG

## 2024-06-06 ENCOUNTER — E-VISIT (OUTPATIENT)
Dept: FAMILY MEDICINE | Facility: CLINIC | Age: 59
End: 2024-06-06
Payer: COMMERCIAL

## 2024-06-06 DIAGNOSIS — M54.9 BACK PAIN, UNSPECIFIED BACK LOCATION, UNSPECIFIED BACK PAIN LATERALITY, UNSPECIFIED CHRONICITY: Primary | ICD-10-CM

## 2024-06-06 PROCEDURE — 99422 OL DIG E/M SVC 11-20 MIN: CPT | Mod: ,,, | Performed by: STUDENT IN AN ORGANIZED HEALTH CARE EDUCATION/TRAINING PROGRAM

## 2024-06-06 RX ORDER — METHYLPREDNISOLONE 4 MG/1
TABLET ORAL
Qty: 21 TABLET | Refills: 0 | Status: SHIPPED | OUTPATIENT
Start: 2024-06-06

## 2024-06-06 RX ORDER — TIZANIDINE 2 MG/1
2 TABLET ORAL EVERY 8 HOURS PRN
Qty: 60 TABLET | Refills: 1 | Status: SHIPPED | OUTPATIENT
Start: 2024-06-06 | End: 2024-07-06

## 2024-06-06 RX ORDER — MELOXICAM 15 MG/1
15 TABLET ORAL DAILY PRN
Qty: 30 TABLET | Refills: 1 | Status: SHIPPED | OUTPATIENT
Start: 2024-06-06 | End: 2024-07-06
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
## 2024-07-15 ENCOUNTER — LAB VISIT (OUTPATIENT)
Dept: LAB | Facility: HOSPITAL | Age: 59
End: 2024-07-15
Attending: COLON & RECTAL SURGERY
Payer: COMMERCIAL

## 2024-07-15 ENCOUNTER — OFFICE VISIT (OUTPATIENT)
Dept: SURGERY | Facility: CLINIC | Age: 59
End: 2024-07-15
Payer: COMMERCIAL

## 2024-07-15 VITALS
SYSTOLIC BLOOD PRESSURE: 157 MMHG | TEMPERATURE: 98 F | HEIGHT: 74 IN | DIASTOLIC BLOOD PRESSURE: 92 MMHG | BODY MASS INDEX: 33.14 KG/M2 | HEART RATE: 87 BPM | WEIGHT: 258.25 LBS

## 2024-07-15 DIAGNOSIS — R10.13 EPIGASTRIC PAIN: ICD-10-CM

## 2024-07-15 DIAGNOSIS — K21.9 GASTROESOPHAGEAL REFLUX DISEASE WITHOUT ESOPHAGITIS: ICD-10-CM

## 2024-07-15 DIAGNOSIS — R10.13 EPIGASTRIC PAIN: Primary | ICD-10-CM

## 2024-07-15 DIAGNOSIS — I10 ESSENTIAL HYPERTENSION: ICD-10-CM

## 2024-07-15 DIAGNOSIS — Z76.89 ENCOUNTER TO ESTABLISH CARE: ICD-10-CM

## 2024-07-15 LAB
ALBUMIN SERPL BCP-MCNC: 4 G/DL (ref 3.5–5.2)
ALP SERPL-CCNC: 69 U/L (ref 55–135)
ALT SERPL W/O P-5'-P-CCNC: 34 U/L (ref 10–44)
ANION GAP SERPL CALC-SCNC: 12 MMOL/L (ref 8–16)
AST SERPL-CCNC: 20 U/L (ref 10–40)
BASOPHILS # BLD AUTO: 0.03 K/UL (ref 0–0.2)
BASOPHILS NFR BLD: 0.3 % (ref 0–1.9)
BILIRUB SERPL-MCNC: 1.1 MG/DL (ref 0.1–1)
BUN SERPL-MCNC: 18 MG/DL (ref 6–20)
CALCIUM SERPL-MCNC: 9.6 MG/DL (ref 8.7–10.5)
CHLORIDE SERPL-SCNC: 104 MMOL/L (ref 95–110)
CO2 SERPL-SCNC: 23 MMOL/L (ref 23–29)
CREAT SERPL-MCNC: 0.9 MG/DL (ref 0.5–1.4)
DIFFERENTIAL METHOD BLD: NORMAL
EOSINOPHIL # BLD AUTO: 0.2 K/UL (ref 0–0.5)
EOSINOPHIL NFR BLD: 2.2 % (ref 0–8)
ERYTHROCYTE [DISTWIDTH] IN BLOOD BY AUTOMATED COUNT: 12.9 % (ref 11.5–14.5)
EST. GFR  (NO RACE VARIABLE): >60 ML/MIN/1.73 M^2
GLUCOSE SERPL-MCNC: 105 MG/DL (ref 70–110)
HCT VFR BLD AUTO: 45.8 % (ref 40–54)
HGB BLD-MCNC: 15.2 G/DL (ref 14–18)
IMM GRANULOCYTES # BLD AUTO: 0.04 K/UL (ref 0–0.04)
IMM GRANULOCYTES NFR BLD AUTO: 0.4 % (ref 0–0.5)
LIPASE SERPL-CCNC: 17 U/L (ref 4–60)
LYMPHOCYTES # BLD AUTO: 1.8 K/UL (ref 1–4.8)
LYMPHOCYTES NFR BLD: 18 % (ref 18–48)
MCH RBC QN AUTO: 29.4 PG (ref 27–31)
MCHC RBC AUTO-ENTMCNC: 33.2 G/DL (ref 32–36)
MCV RBC AUTO: 89 FL (ref 82–98)
MONOCYTES # BLD AUTO: 0.7 K/UL (ref 0.3–1)
MONOCYTES NFR BLD: 6.8 % (ref 4–15)
NEUTROPHILS # BLD AUTO: 7.4 K/UL (ref 1.8–7.7)
NEUTROPHILS NFR BLD: 72.3 % (ref 38–73)
NRBC BLD-RTO: 0 /100 WBC
PLATELET # BLD AUTO: 264 K/UL (ref 150–450)
PMV BLD AUTO: 11.5 FL (ref 9.2–12.9)
POTASSIUM SERPL-SCNC: 4 MMOL/L (ref 3.5–5.1)
PROT SERPL-MCNC: 7.2 G/DL (ref 6–8.4)
RBC # BLD AUTO: 5.17 M/UL (ref 4.6–6.2)
SODIUM SERPL-SCNC: 139 MMOL/L (ref 136–145)
WBC # BLD AUTO: 10.23 K/UL (ref 3.9–12.7)

## 2024-07-15 PROCEDURE — 3080F DIAST BP >= 90 MM HG: CPT | Mod: CPTII,S$GLB,, | Performed by: COLON & RECTAL SURGERY

## 2024-07-15 PROCEDURE — 99204 OFFICE O/P NEW MOD 45 MIN: CPT | Mod: S$GLB,,, | Performed by: COLON & RECTAL SURGERY

## 2024-07-15 PROCEDURE — 36415 COLL VENOUS BLD VENIPUNCTURE: CPT | Performed by: COLON & RECTAL SURGERY

## 2024-07-15 PROCEDURE — 3008F BODY MASS INDEX DOCD: CPT | Mod: CPTII,S$GLB,, | Performed by: COLON & RECTAL SURGERY

## 2024-07-15 PROCEDURE — 83690 ASSAY OF LIPASE: CPT | Performed by: COLON & RECTAL SURGERY

## 2024-07-15 PROCEDURE — 85025 COMPLETE CBC W/AUTO DIFF WBC: CPT | Performed by: COLON & RECTAL SURGERY

## 2024-07-15 PROCEDURE — 80053 COMPREHEN METABOLIC PANEL: CPT | Performed by: COLON & RECTAL SURGERY

## 2024-07-15 PROCEDURE — 99999 PR PBB SHADOW E&M-EST. PATIENT-LVL IV: CPT | Mod: PBBFAC,,, | Performed by: COLON & RECTAL SURGERY

## 2024-07-15 PROCEDURE — 1159F MED LIST DOCD IN RCRD: CPT | Mod: CPTII,S$GLB,, | Performed by: COLON & RECTAL SURGERY

## 2024-07-15 PROCEDURE — 3077F SYST BP >= 140 MM HG: CPT | Mod: CPTII,S$GLB,, | Performed by: COLON & RECTAL SURGERY

## 2024-07-15 RX ORDER — AMLODIPINE BESYLATE 5 MG/1
10 TABLET ORAL DAILY
Qty: 180 TABLET | Refills: 1 | Status: SHIPPED | OUTPATIENT
Start: 2024-07-15

## 2024-07-15 RX ORDER — PANTOPRAZOLE SODIUM 40 MG/1
40 TABLET, DELAYED RELEASE ORAL DAILY
Qty: 90 TABLET | Refills: 1 | Status: SHIPPED | OUTPATIENT
Start: 2024-07-15

## 2024-07-15 NOTE — PROGRESS NOTES
History & Physical    SUBJECTIVE:     CC: Diverticulitis  Ref MD: Aleja Barnett MD    History of Present Illness:  Patient is a 58 y.o. male presents for evaluation of diverticulitis.  Patient was recently admitted to the hospital 2-3 weeks ago with perforated sigmoid diverticulitis.  He did not require any percutaneous drainage procedure and resolved with IV antibiotics that were covered p.o. antibiotics and he has now completed an outpatient course of p.o. antibiotics and his symptoms have completely resolved.  Patient states that he initially had uncomplicated sigmoid diverticulitis around 3 years ago that did require visit to the hospital was treated with p.o. antibiotics.  He has had about every 6 months recurrent bouts of sigmoid diverticulitis that have not required hospitalization or imaging that he takes outpatient antibiotics for, but this most recent attack was more severe than any of his previous attacks.  He states that he is having no fever, chills, nausea, vomiting, diarrhea, constipation.  He has never had a colonoscopy.  He has no personal family history of colorectal cancer.  He is interested in surgical resection of this area. I have reviewed his past medical records and imaging as well spoken with his referring provider.    6/8/21: Lap sigmoidectomy    Interval history:  Since last clinic visit, patient has done mostly well.  He had resolution of all of his symptoms.  However he developed upper abdominal and epigastric abdominal cramping around 1 year ago.  He also has associated reflux.  He states that this is not like his previous pain associated with diverticulitis.  Denies any hematochezia, melena, fever, chills, nausea, vomiting.  The pain is crampy and bandlike across his epigastric and upper abdomen.  Denies any lower abdominal pain.    Review of patient's allergies indicates:  No Known Allergies    Current Outpatient Medications   Medication Sig Dispense Refill    traMADoL (ULTRAM) 50  mg tablet Take 1 tablet (50 mg total) by mouth every 8 (eight) hours as needed for Pain. 30 tablet 5    amLODIPine (NORVASC) 5 MG tablet Take 2 tablets (10 mg total) by mouth once daily. 180 tablet 1    methylPREDNISolone (MEDROL DOSEPACK) 4 mg tablet follow package directions (Patient not taking: Reported on 7/15/2024) 21 tablet 0    metroNIDAZOLE (FLAGYL) 500 MG tablet Take 1 tablet (500 mg total) by mouth every 12 (twelve) hours. (Patient not taking: Reported on 7/15/2024) 20 tablet 0    pantoprazole (PROTONIX) 40 MG tablet Take 1 tablet (40 mg total) by mouth once daily. 90 tablet 1     No current facility-administered medications for this visit.     Facility-Administered Medications Ordered in Other Visits   Medication Dose Route Frequency Provider Last Rate Last Admin    lactated ringers infusion   Intravenous On Call Procedure Julia Quintana PA           Past Medical History:   Diagnosis Date    Back pain     Digestive disorder     Disc     Bulging disc in back    Diverticulitis     Hypertension      Past Surgical History:   Procedure Laterality Date    COLONOSCOPY N/A 3/13/2020    Procedure: COLONOSCOPY;  Surgeon: Mat Guerrero MD;  Location: [facility];  Service: General;  Laterality: N/A;    ESOPHAGOGASTRODUODENOSCOPY N/A 4/15/2021    Procedure: EGD (ESOPHAGOGASTRODUODENOSCOPY) needs rapid COVID;  Surgeon: Josie Livingston MD;  Location: [facility];  Service: Endoscopy;  Laterality: N/A;    INJECTION OF ANESTHETIC AGENT INTO TISSUE PLANE DEFINED BY TRANSVERSUS ABDOMINIS MUSCLE N/A 6/8/2021    Procedure: BLOCK, TRANSVERSUS ABDOMINIS PLANE;  Surgeon: Mat Guerrero MD;  Location: [facility];  Service: General;  Laterality: N/A;    LAPAROSCOPIC SIGMOIDECTOMY N/A 6/8/2021    Procedure: COLECTOMY, SIGMOID, LAPAROSCOPIC;  Surgeon: Mat Guerrero MD;  Location: [facility] OR;  Service: General;  Laterality: N/A;    MOBILIZATION OF SPLENIC FLEXURE N/A 6/8/2021    Procedure: MOBILIZATION, SPLENIC FLEXURE;   Surgeon: Mat Guerrero MD;  Location: [facility];  Service: General;  Laterality: N/A;  Laparoscopic    UNDESCENDED TESTICLE EXPLORATION Left     Age 15     Family History   Problem Relation Name Age of Onset    Diverticulitis Mother      No Known Problems Father      No Known Problems Sister      No Known Problems Brother      No Known Problems Brother       Social History     Tobacco Use    Smoking status: Former     Current packs/day: 0.00     Average packs/day: 1 pack/day for 25.0 years (25.0 ttl pk-yrs)     Types: Cigarettes     Start date: 4/10/1987     Quit date: 4/10/2012     Years since quittin.2    Smokeless tobacco: Never   Substance Use Topics    Alcohol use: Yes     Alcohol/week: 2.0 standard drinks of alcohol     Types: 2 Glasses of wine per week     Comment: Socially on the weekend    Drug use: No        Review of Systems:  Review of Systems   Constitutional:  Negative for activity change, appetite change, chills, fatigue, fever and unexpected weight change.   HENT:  Negative for congestion, ear pain, sore throat and trouble swallowing.    Eyes:  Negative for pain, redness and itching.   Respiratory:  Negative for cough, shortness of breath and wheezing.    Cardiovascular:  Negative for chest pain, palpitations and leg swelling.   Gastrointestinal:  Positive for abdominal distention and abdominal pain. Negative for anal bleeding, blood in stool, constipation, diarrhea, nausea, rectal pain and vomiting.   Endocrine: Negative for cold intolerance, heat intolerance and polyuria.   Genitourinary:  Negative for dysuria, flank pain, frequency and hematuria.   Musculoskeletal:  Negative for gait problem, joint swelling and neck pain.   Skin:  Negative for color change, rash and wound.   Allergic/Immunologic: Negative for environmental allergies and immunocompromised state.   Neurological:  Negative for dizziness, speech difficulty, weakness and numbness.   Psychiatric/Behavioral:  Negative for  "agitation, confusion and hallucinations.        OBJECTIVE:     Vital Signs (Most Recent)  Temp: 98.1 °F (36.7 °C) (07/15/24 1459)  Pulse: 87 (07/15/24 1459)  BP: (!) 157/92 (07/15/24 1459)  6' 2" (1.88 m)  117.1 kg (258 lb 4.3 oz)     Physical Exam:  Physical Exam  Constitutional:       Appearance: He is well-developed.   HENT:      Head: Normocephalic and atraumatic.   Eyes:      Conjunctiva/sclera: Conjunctivae normal.   Neck:      Thyroid: No thyromegaly.   Cardiovascular:      Rate and Rhythm: Normal rate and regular rhythm.   Pulmonary:      Effort: Pulmonary effort is normal. No respiratory distress.   Abdominal:      Comments: Soft, nondistended, mild epigastric TTP; no rebound or guarding; Well-healed port and extraction sites   Musculoskeletal:         General: No tenderness. Normal range of motion.      Cervical back: Normal range of motion.   Skin:     General: Skin is warm and dry.      Capillary Refill: Capillary refill takes less than 2 seconds.      Findings: No rash.   Neurological:      Mental Status: He is alert and oriented to person, place, and time.         Laboratory  Lab Results   Component Value Date    WBC 7.50 07/19/2022    HGB 16.1 07/19/2022    HCT 47.8 07/19/2022     07/19/2022    CHOL 247 (H) 07/19/2022    TRIG 173 (H) 07/19/2022    HDL 49 07/19/2022    ALT 66 (H) 10/28/2022    AST 40 10/28/2022     10/28/2022    K 4.3 10/28/2022     10/28/2022    CREATININE 0.9 10/28/2022    BUN 20 10/28/2022    CO2 21 (L) 10/28/2022    INR 1.0 12/27/2016       Results for orders placed during the hospital encounter of 01/24/20   CT Abdomen Pelvis W Wo Contrast    Narrative EXAMINATION:  CT ABDOMEN PELVIS W WO CONTRAST    CLINICAL HISTORY:  Abd pain, fever, abscess suspected;Left lower quadrant pain    TECHNIQUE:  Low dose axial images, sagittal and coronal reformations were obtained from the lung bases to the pubic symphysis before and following the IV administration of 100 mL of " Omnipaque 350.  30 mL of oral Omnipaque 350 was also administered.    COMPARISON:  None    FINDINGS:  ABDOMEN    Lung bases: There are dependent changes noted within the bilateral lung bases.    Liver/gallbladder/biliary: There are multiple nonenhancing lesion seen scattered throughout both lobes of the liver including the caudate lobe most consistent with cysts.  The gallbladder is present and unremarkable.No biliary ductal dilation.    Pancreas: The pancreas is unremarkable in appearance.    Spleen: The spleen is not enlarged.    Adrenals: Unremarkable    Kidneys: The kidneys are equally perfused and demonstrate no solid masses. No nephrolithiasis.    Bowel/Mesentery: There is circumferential wall thickening associated with a fairly long segment of the mid sigmoid colon which measures approximately 6.5 cm in length.  There are diverticula noted in this region along with a pocket of some fluid and free air seen just anterior to this portion of the colon that measures approximately 2.7 cm and is suspicious for ruptured diverticulitis.  No well-defined fluid collections noted at this time.  There is inflammatory stranding seen surrounding this segment of the colon.  The possibility of an underlying colonic mass is not completely excluded and therefore follow-up with colonoscopy after the resolution of the acute event is recommended.    Retroperitoneum: No adenopathy.A retroaortic left renal vein is noted.    PELVIS    Genitourinary/Reproductive organs: Unremarkable    Adenopathy: None    Free Fluid: No free fluid    Osseus Structures/Soft tissues: No suspicious appearing osseus lesions. No significant soft tissue abnormality.      Impression 1. Findings highly suspicious for ruptured diverticulitis with a collection of air and minimal fluid seen anterior to the midportion of the sigmoid colon as described above.  No well-defined wall surrounding this collection at this time to suggest abscess.  The possibility of  underlying colonic mass is not completely excluded and therefore further evaluation with colonoscopy is recommended after resolution of the acute event.  2. Multiple hypodensities noted within the liver most consistent with hepatic cysts.  3. Remaining findings as discussed above.  4.  This report was flagged in Epic as abnormal.      Electronically signed by: Yordy Patterson DO  Date:    01/24/2020  Time:    10:36         Diagnostic Results:  CT: Reviewed   Colonoscopy: Reviewed    PATHOLOGY:  Diagnosis 1. Sigmoid colon (resection):   - Segment of colon with diverticulitis and peridiverticular chronic   inflammation and fibrosis   - No malignancy   - Resection margins viable, with no histologic abnormality   2.  Anastomotic donuts (excision):   - Segments of benign colon with no diagnostic abnormality          ASSESSMENT/PLAN:     59yo M with recurrent sigmoid diverticulitis with previous perforated diverticulitis requiring inpatient admission and IV antibiotics but no percutaneous drainage who is s/p laparoscopic sigmoidectomy on 06/08/2021 recovered well postoperatively but has new onset reflux with associated epigastric/band-like abdominal pain    - check CBC, CMP, lipase today  - CT Abd/Pelvis to evaluate for causes of his abdominal pain  - if CT negative, we will refer to Gastroenterology for evaluation of GERD and reflux and consideration of upper endoscopy  - referral made to PCP/Dr. Dang to establish care  - RTC PRN    Mat Guerrero MD  Colon and Rectal Surgery  [facility]

## 2024-07-22 ENCOUNTER — HOSPITAL ENCOUNTER (OUTPATIENT)
Dept: RADIOLOGY | Facility: HOSPITAL | Age: 59
Discharge: HOME | End: 2024-07-22
Attending: COLON & RECTAL SURGERY
Payer: COMMERCIAL

## 2024-07-22 DIAGNOSIS — R10.13 EPIGASTRIC PAIN: ICD-10-CM

## 2024-07-22 PROCEDURE — 74177 CT ABD & PELVIS W/CONTRAST: CPT | Mod: TC

## 2024-07-22 PROCEDURE — A9698 NON-RAD CONTRAST MATERIALNOC: HCPCS | Performed by: COLON & RECTAL SURGERY

## 2024-07-22 PROCEDURE — 74177 CT ABD & PELVIS W/CONTRAST: CPT | Mod: 26,,, | Performed by: RADIOLOGY

## 2024-07-22 PROCEDURE — 25500020 PHARM REV CODE 255: Performed by: COLON & RECTAL SURGERY

## 2024-07-22 RX ADMIN — IOHEXOL 1000 ML: 12 SOLUTION ORAL at 12:07

## 2024-07-22 RX ADMIN — IOHEXOL 100 ML: 350 INJECTION, SOLUTION INTRAVENOUS at 12:07

## 2024-08-01 ENCOUNTER — LAB VISIT (OUTPATIENT)
Dept: LAB | Facility: HOSPITAL | Age: 59
End: 2024-08-01
Attending: FAMILY MEDICINE
Payer: COMMERCIAL

## 2024-08-01 ENCOUNTER — OFFICE VISIT (OUTPATIENT)
Dept: INTERNAL MEDICINE | Facility: CLINIC | Age: 59
End: 2024-08-01
Payer: COMMERCIAL

## 2024-08-01 VITALS
RESPIRATION RATE: 18 BRPM | DIASTOLIC BLOOD PRESSURE: 98 MMHG | OXYGEN SATURATION: 95 % | WEIGHT: 264 LBS | BODY MASS INDEX: 33.9 KG/M2 | SYSTOLIC BLOOD PRESSURE: 138 MMHG | HEART RATE: 82 BPM

## 2024-08-01 DIAGNOSIS — Z12.5 SCREENING FOR PROSTATE CANCER: ICD-10-CM

## 2024-08-01 DIAGNOSIS — G25.81 RLS (RESTLESS LEGS SYNDROME): ICD-10-CM

## 2024-08-01 DIAGNOSIS — N32.89 BLADDER WALL THICKENING: ICD-10-CM

## 2024-08-01 DIAGNOSIS — Z79.899 ENCOUNTER FOR LONG-TERM (CURRENT) USE OF MEDICATIONS: ICD-10-CM

## 2024-08-01 DIAGNOSIS — Z11.4 SCREENING FOR HIV (HUMAN IMMUNODEFICIENCY VIRUS): ICD-10-CM

## 2024-08-01 DIAGNOSIS — R10.13 EPIGASTRIC PAIN: ICD-10-CM

## 2024-08-01 DIAGNOSIS — R25.2 MUSCLE CRAMPS: Primary | ICD-10-CM

## 2024-08-01 DIAGNOSIS — I10 ESSENTIAL HYPERTENSION: ICD-10-CM

## 2024-08-01 DIAGNOSIS — K21.9 GASTROESOPHAGEAL REFLUX DISEASE WITHOUT ESOPHAGITIS: ICD-10-CM

## 2024-08-01 DIAGNOSIS — E66.09 CLASS 1 OBESITY DUE TO EXCESS CALORIES WITH SERIOUS COMORBIDITY AND BODY MASS INDEX (BMI) OF 33.0 TO 33.9 IN ADULT: ICD-10-CM

## 2024-08-01 DIAGNOSIS — R25.2 MUSCLE CRAMPS: ICD-10-CM

## 2024-08-01 DIAGNOSIS — Z87.891 HISTORY OF SMOKING 10-25 PACK YEARS: ICD-10-CM

## 2024-08-01 LAB
CHOLEST SERPL-MCNC: 265 MG/DL (ref 120–199)
CHOLEST/HDLC SERPL: 5.2 {RATIO} (ref 2–5)
CK SERPL-CCNC: 410 U/L (ref 20–200)
COMPLEXED PSA SERPL-MCNC: 5.5 NG/ML (ref 0–4)
ESTIMATED AVG GLUCOSE: 120 MG/DL (ref 68–131)
HBA1C MFR BLD: 5.8 % (ref 4–5.6)
HDLC SERPL-MCNC: 51 MG/DL (ref 40–75)
HDLC SERPL: 19.2 % (ref 20–50)
HIV 1+2 AB+HIV1 P24 AG SERPL QL IA: NORMAL
LDLC SERPL CALC-MCNC: 170 MG/DL (ref 63–159)
MAGNESIUM SERPL-MCNC: 1.9 MG/DL (ref 1.6–2.6)
NONHDLC SERPL-MCNC: 214 MG/DL
TRIGL SERPL-MCNC: 220 MG/DL (ref 30–150)
TSH SERPL DL<=0.005 MIU/L-ACNC: 2.32 UIU/ML (ref 0.4–4)
VIT B12 SERPL-MCNC: 368 PG/ML (ref 210–950)

## 2024-08-01 PROCEDURE — 83036 HEMOGLOBIN GLYCOSYLATED A1C: CPT | Performed by: FAMILY MEDICINE

## 2024-08-01 PROCEDURE — 87389 HIV-1 AG W/HIV-1&-2 AB AG IA: CPT | Performed by: FAMILY MEDICINE

## 2024-08-01 PROCEDURE — 82607 VITAMIN B-12: CPT | Performed by: FAMILY MEDICINE

## 2024-08-01 PROCEDURE — 3075F SYST BP GE 130 - 139MM HG: CPT | Mod: CPTII,S$GLB,, | Performed by: FAMILY MEDICINE

## 2024-08-01 PROCEDURE — G2211 COMPLEX E/M VISIT ADD ON: HCPCS | Mod: S$GLB,,, | Performed by: FAMILY MEDICINE

## 2024-08-01 PROCEDURE — 1159F MED LIST DOCD IN RCRD: CPT | Mod: CPTII,S$GLB,, | Performed by: FAMILY MEDICINE

## 2024-08-01 PROCEDURE — 84153 ASSAY OF PSA TOTAL: CPT | Performed by: FAMILY MEDICINE

## 2024-08-01 PROCEDURE — 99215 OFFICE O/P EST HI 40 MIN: CPT | Mod: S$GLB,,, | Performed by: FAMILY MEDICINE

## 2024-08-01 PROCEDURE — 36415 COLL VENOUS BLD VENIPUNCTURE: CPT | Performed by: FAMILY MEDICINE

## 2024-08-01 PROCEDURE — 84443 ASSAY THYROID STIM HORMONE: CPT | Performed by: FAMILY MEDICINE

## 2024-08-01 PROCEDURE — 99999 PR PBB SHADOW E&M-EST. PATIENT-LVL III: CPT | Mod: PBBFAC,,, | Performed by: FAMILY MEDICINE

## 2024-08-01 PROCEDURE — 3008F BODY MASS INDEX DOCD: CPT | Mod: CPTII,S$GLB,, | Performed by: FAMILY MEDICINE

## 2024-08-01 PROCEDURE — 83735 ASSAY OF MAGNESIUM: CPT | Performed by: FAMILY MEDICINE

## 2024-08-01 PROCEDURE — 82550 ASSAY OF CK (CPK): CPT | Performed by: FAMILY MEDICINE

## 2024-08-01 PROCEDURE — 3080F DIAST BP >= 90 MM HG: CPT | Mod: CPTII,S$GLB,, | Performed by: FAMILY MEDICINE

## 2024-08-01 PROCEDURE — 80061 LIPID PANEL: CPT | Performed by: FAMILY MEDICINE

## 2024-08-01 RX ORDER — BACLOFEN 10 MG/1
10 TABLET ORAL 3 TIMES DAILY PRN
Qty: 90 TABLET | Refills: 0 | Status: SHIPPED | OUTPATIENT
Start: 2024-08-01 | End: 2025-08-01

## 2024-08-01 RX ORDER — GABAPENTIN 300 MG/1
300 CAPSULE ORAL NIGHTLY PRN
Qty: 90 CAPSULE | Refills: 0 | Status: SHIPPED | OUTPATIENT
Start: 2024-08-01 | End: 2025-08-01

## 2024-08-01 RX ORDER — PANTOPRAZOLE SODIUM 40 MG/1
40 TABLET, DELAYED RELEASE ORAL 2 TIMES DAILY
Qty: 180 TABLET | Refills: 1 | Status: SHIPPED | OUTPATIENT
Start: 2024-08-01
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
## 2024-08-02 DIAGNOSIS — R97.20 ELEVATED PSA: Primary | ICD-10-CM

## 2024-08-06 ENCOUNTER — OFFICE VISIT (OUTPATIENT)
Dept: UROLOGY | Facility: CLINIC | Age: 59
End: 2024-08-06
Payer: COMMERCIAL

## 2024-08-06 ENCOUNTER — LAB VISIT (OUTPATIENT)
Dept: LAB | Facility: HOSPITAL | Age: 59
End: 2024-08-06
Attending: UROLOGY
Payer: COMMERCIAL

## 2024-08-06 VITALS
WEIGHT: 270.81 LBS | HEART RATE: 77 BPM | RESPIRATION RATE: 14 BRPM | DIASTOLIC BLOOD PRESSURE: 94 MMHG | HEIGHT: 74 IN | SYSTOLIC BLOOD PRESSURE: 143 MMHG | BODY MASS INDEX: 34.76 KG/M2

## 2024-08-06 DIAGNOSIS — N40.1 BPH WITH OBSTRUCTION/LOWER URINARY TRACT SYMPTOMS: ICD-10-CM

## 2024-08-06 DIAGNOSIS — R97.20 ELEVATED PSA: Primary | ICD-10-CM

## 2024-08-06 DIAGNOSIS — R97.20 ELEVATED PSA: ICD-10-CM

## 2024-08-06 DIAGNOSIS — N13.8 BPH WITH OBSTRUCTION/LOWER URINARY TRACT SYMPTOMS: ICD-10-CM

## 2024-08-06 LAB
CREAT SERPL-MCNC: 0.9 MG/DL (ref 0.5–1.4)
EST. GFR  (NO RACE VARIABLE): >60 ML/MIN/1.73 M^2

## 2024-08-06 PROCEDURE — 99999 PR PBB SHADOW E&M-EST. PATIENT-LVL IV: CPT | Mod: PBBFAC,,, | Performed by: UROLOGY

## 2024-08-06 PROCEDURE — 99204 OFFICE O/P NEW MOD 45 MIN: CPT | Mod: S$GLB,,, | Performed by: UROLOGY

## 2024-08-06 PROCEDURE — 1159F MED LIST DOCD IN RCRD: CPT | Mod: CPTII,S$GLB,, | Performed by: UROLOGY

## 2024-08-06 PROCEDURE — 3044F HG A1C LEVEL LT 7.0%: CPT | Mod: CPTII,S$GLB,, | Performed by: UROLOGY

## 2024-08-06 PROCEDURE — 82565 ASSAY OF CREATININE: CPT | Performed by: UROLOGY

## 2024-08-06 PROCEDURE — 3077F SYST BP >= 140 MM HG: CPT | Mod: CPTII,S$GLB,, | Performed by: UROLOGY

## 2024-08-06 PROCEDURE — 3008F BODY MASS INDEX DOCD: CPT | Mod: CPTII,S$GLB,, | Performed by: UROLOGY

## 2024-08-06 PROCEDURE — 3080F DIAST BP >= 90 MM HG: CPT | Mod: CPTII,S$GLB,, | Performed by: UROLOGY

## 2024-08-15 ENCOUNTER — OFFICE VISIT (OUTPATIENT)
Dept: INTERNAL MEDICINE | Facility: CLINIC | Age: 59
End: 2024-08-15
Payer: COMMERCIAL

## 2024-08-15 VITALS
OXYGEN SATURATION: 99 % | RESPIRATION RATE: 18 BRPM | HEART RATE: 95 BPM | DIASTOLIC BLOOD PRESSURE: 88 MMHG | TEMPERATURE: 97 F | BODY MASS INDEX: 34.28 KG/M2 | WEIGHT: 267 LBS | SYSTOLIC BLOOD PRESSURE: 128 MMHG

## 2024-08-15 DIAGNOSIS — I10 HYPERTENSION, UNSPECIFIED TYPE: ICD-10-CM

## 2024-08-15 DIAGNOSIS — R10.13 EPIGASTRIC PAIN: ICD-10-CM

## 2024-08-15 DIAGNOSIS — R74.8 ELEVATED CREATINE KINASE: ICD-10-CM

## 2024-08-15 DIAGNOSIS — E78.5 HYPERLIPIDEMIA, UNSPECIFIED HYPERLIPIDEMIA TYPE: ICD-10-CM

## 2024-08-15 DIAGNOSIS — I10 ESSENTIAL HYPERTENSION: Primary | ICD-10-CM

## 2024-08-15 DIAGNOSIS — R97.20 ELEVATED PSA: ICD-10-CM

## 2024-08-15 DIAGNOSIS — R25.2 MUSCLE CRAMPS: ICD-10-CM

## 2024-08-15 DIAGNOSIS — R73.03 PREDIABETES: ICD-10-CM

## 2024-08-15 DIAGNOSIS — G25.81 RLS (RESTLESS LEGS SYNDROME): ICD-10-CM

## 2024-08-15 PROCEDURE — 3008F BODY MASS INDEX DOCD: CPT | Mod: CPTII,S$GLB,, | Performed by: FAMILY MEDICINE

## 2024-08-15 PROCEDURE — G2211 COMPLEX E/M VISIT ADD ON: HCPCS | Mod: S$GLB,,, | Performed by: FAMILY MEDICINE

## 2024-08-15 PROCEDURE — 3074F SYST BP LT 130 MM HG: CPT | Mod: CPTII,S$GLB,, | Performed by: FAMILY MEDICINE

## 2024-08-15 PROCEDURE — 3079F DIAST BP 80-89 MM HG: CPT | Mod: CPTII,S$GLB,, | Performed by: FAMILY MEDICINE

## 2024-08-15 PROCEDURE — 99214 OFFICE O/P EST MOD 30 MIN: CPT | Mod: S$GLB,,, | Performed by: FAMILY MEDICINE

## 2024-08-15 PROCEDURE — 1159F MED LIST DOCD IN RCRD: CPT | Mod: CPTII,S$GLB,, | Performed by: FAMILY MEDICINE

## 2024-08-15 PROCEDURE — 99999 PR PBB SHADOW E&M-EST. PATIENT-LVL III: CPT | Mod: PBBFAC,,, | Performed by: FAMILY MEDICINE

## 2024-08-15 PROCEDURE — 3044F HG A1C LEVEL LT 7.0%: CPT | Mod: CPTII,S$GLB,, | Performed by: FAMILY MEDICINE

## 2024-08-15 RX ORDER — ATORVASTATIN CALCIUM 20 MG/1
20 TABLET, FILM COATED ORAL DAILY
Qty: 90 TABLET | Refills: 3 | Status: SHIPPED | OUTPATIENT
Start: 2024-08-15 | End: 2025-08-15
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
## 2024-08-23 ENCOUNTER — HOSPITAL ENCOUNTER (OUTPATIENT)
Dept: RADIOLOGY | Facility: HOSPITAL | Age: 59
Discharge: HOME | End: 2024-08-23
Attending: FAMILY MEDICINE
Payer: COMMERCIAL

## 2024-08-23 DIAGNOSIS — Z87.891 HISTORY OF SMOKING 10-25 PACK YEARS: ICD-10-CM

## 2024-08-23 PROCEDURE — 71271 CT THORAX LUNG CANCER SCR C-: CPT | Mod: 26,,, | Performed by: RADIOLOGY

## 2024-08-23 PROCEDURE — 71271 CT THORAX LUNG CANCER SCR C-: CPT | Mod: TC

## 2024-08-26 ENCOUNTER — PATIENT MESSAGE (OUTPATIENT)
Dept: UROLOGY | Facility: CLINIC | Age: 59
End: 2024-08-26
Payer: COMMERCIAL

## 2024-08-26 ENCOUNTER — OFFICE VISIT (OUTPATIENT)
Dept: GASTROENTEROLOGY | Facility: CLINIC | Age: 59
End: 2024-08-26
Payer: COMMERCIAL

## 2024-08-26 ENCOUNTER — PATIENT MESSAGE (OUTPATIENT)
Dept: INTERNAL MEDICINE | Facility: CLINIC | Age: 59
End: 2024-08-26
Payer: COMMERCIAL

## 2024-08-26 VITALS
SYSTOLIC BLOOD PRESSURE: 138 MMHG | HEART RATE: 94 BPM | DIASTOLIC BLOOD PRESSURE: 96 MMHG | HEIGHT: 74 IN | WEIGHT: 267 LBS | BODY MASS INDEX: 34.27 KG/M2

## 2024-08-26 DIAGNOSIS — K21.9 GASTROESOPHAGEAL REFLUX DISEASE WITHOUT ESOPHAGITIS: ICD-10-CM

## 2024-08-26 DIAGNOSIS — R10.9 ABDOMINAL CRAMPING: Primary | ICD-10-CM

## 2024-08-26 DIAGNOSIS — R10.13 EPIGASTRIC PAIN: ICD-10-CM

## 2024-08-26 PROCEDURE — 1159F MED LIST DOCD IN RCRD: CPT | Mod: CPTII,S$GLB,, | Performed by: NURSE PRACTITIONER

## 2024-08-26 PROCEDURE — 3080F DIAST BP >= 90 MM HG: CPT | Mod: CPTII,S$GLB,, | Performed by: NURSE PRACTITIONER

## 2024-08-26 PROCEDURE — 3044F HG A1C LEVEL LT 7.0%: CPT | Mod: CPTII,S$GLB,, | Performed by: NURSE PRACTITIONER

## 2024-08-26 PROCEDURE — 99204 OFFICE O/P NEW MOD 45 MIN: CPT | Mod: S$GLB,,, | Performed by: NURSE PRACTITIONER

## 2024-08-26 PROCEDURE — 99999 PR PBB SHADOW E&M-EST. PATIENT-LVL IV: CPT | Mod: PBBFAC,,, | Performed by: NURSE PRACTITIONER

## 2024-08-26 PROCEDURE — 3008F BODY MASS INDEX DOCD: CPT | Mod: CPTII,S$GLB,, | Performed by: NURSE PRACTITIONER

## 2024-08-26 PROCEDURE — 3075F SYST BP GE 130 - 139MM HG: CPT | Mod: CPTII,S$GLB,, | Performed by: NURSE PRACTITIONER

## 2024-08-26 PROCEDURE — 1160F RVW MEDS BY RX/DR IN RCRD: CPT | Mod: CPTII,S$GLB,, | Performed by: NURSE PRACTITIONER

## 2024-08-26 RX ORDER — DICYCLOMINE HYDROCHLORIDE 20 MG/1
20 TABLET ORAL 3 TIMES DAILY PRN
Qty: 90 TABLET | Refills: 5 | Status: SHIPPED | OUTPATIENT
Start: 2024-08-26

## 2024-08-26 NOTE — PROGRESS NOTES
Clinic Consult:  [facility] Consultation Note    Reason for Consult:  The primary encounter diagnosis was Abdominal cramping. Diagnoses of Gastroesophageal reflux disease without esophagitis and Epigastric pain were also pertinent to this visit.    PCP: Edson Dang   [address] [place] [address]  / [place] [id number]    HPI:  This is a 58 y.o. male here for evaluation of   Reports epigastric pain as well as cramping that moves around. Epigastric pain-- he describes this as bloating. He denies any dysphagia. He says he feels these cramps more after cooling off after working outside. It can happen when he is not working outside but is not as bad. Also notices this when he gets in his truck and moves/twists/turns his upper body that will set the pain off. He denies any constipation or diarrhea. He has a hx of recurring diverticulitis with perforation. S/P sigmoidectomy in 2021. No issues with diverticulitis since.   He has GERD-- has been on pantoprazole for a couple of years. His prescription was increased to BID at some point. He does feel that this controls his reflux.     Review of Systems   Constitutional:  Negative for fever and weight loss.   HENT:  Negative for sore throat.    Respiratory:  Negative for cough, shortness of breath and wheezing.    Cardiovascular:  Negative for chest pain and palpitations.   Gastrointestinal:  Positive for abdominal pain and heartburn. Negative for blood in stool, constipation, diarrhea, melena and nausea.   Skin:  Negative for itching and rash.       Medical History:  has a past medical history of Back pain, Digestive disorder, Disc, Diverticulitis, and Hypertension.    Surgical History:  has a past surgical history that includes Undescended testicle exploration (Left); Colonoscopy (N/A, 03/13/2020); Esophagogastroduodenoscopy (N/A, 04/15/2021); Laparoscopic sigmoidectomy (N/A, 06/08/2021); Injection of anesthetic agent into tissue plane defined by transversus  "abdominis muscle (N/A, 06/08/2021); and Mobilization of splenic flexure (N/A, 06/08/2021).    Family History: family history includes Diverticulitis in his mother; No Known Problems in his brother, brother, father, and sister..     Social History:  reports that he quit smoking about 12 years ago. His smoking use included cigarettes. He started smoking about 37 years ago. He has a 25 pack-year smoking history. He has never been exposed to tobacco smoke. He has never used smokeless tobacco. He reports that he does not currently use alcohol after a past usage of about 2.0 standard drinks of alcohol per week. He reports that he does not use drugs.    Allergies: Reviewed    Home Medications:   Current Outpatient Medications on File Prior to Visit   Medication Sig Dispense Refill    amLODIPine (NORVASC) 5 MG tablet Take 2 tablets (10 mg total) by mouth once daily. 180 tablet 1    atorvastatin (LIPITOR) 20 MG tablet Take 1 tablet (20 mg total) by mouth once daily. 90 tablet 3    baclofen (LIORESAL) 10 MG tablet Take 1 tablet (10 mg total) by mouth 3 (three) times daily as needed. 90 tablet 0    gabapentin (NEURONTIN) 300 MG capsule Take 1 capsule (300 mg total) by mouth nightly as needed (Restless leg). 90 capsule 0    pantoprazole (PROTONIX) 40 MG tablet Take 1 tablet (40 mg total) by mouth 2 (two) times daily. 180 tablet 1     No current facility-administered medications on file prior to visit.       Physical Exam:  BP (!) 138/96 (BP Location: Left arm, Patient Position: Sitting, BP Method: Large (Manual))   Pulse 94   Ht 6' 2" (1.88 m)   Wt 121.1 kg (266 lb 15.6 oz)   BMI 34.28 kg/m²   Body mass index is 34.28 kg/m².  Physical Exam  Constitutional:       General: He is not in acute distress.  HENT:      Head: Normocephalic.   Cardiovascular:      Rate and Rhythm: Normal rate and regular rhythm.      Heart sounds: Normal heart sounds. No murmur heard.  Pulmonary:      Effort: Pulmonary effort is normal. No " respiratory distress.      Breath sounds: Normal breath sounds.   Abdominal:      General: Bowel sounds are normal.   Neurological:      General: No focal deficit present.      Mental Status: He is alert.   Psychiatric:         Mood and Affect: Mood normal.         Behavior: Behavior normal.         Judgment: Judgment normal.         Labs: Pertinent labs reviewed.  CRC Screening: up to date     Assessment:  1. Abdominal cramping    2. Gastroesophageal reflux disease without esophagitis    3. Epigastric pain    Possible musculoskeletal pain vs intestinal spasms. Epigastric pain also present-- with bloating. Has known GERD that seems well controled on BID PPI.  No bowel change    Recommendations:   - Bentyl PRN  - EGD  - A referral has been placed for endoscopy procedure scheduling and phone/audio appointment has been scheduled.   - continue pantoprazole BID.     Abdominal cramping  -     dicyclomine (BENTYL) 20 mg tablet; Take 1 tablet (20 mg total) by mouth 3 (three) times daily as needed (abdominal pain).  Dispense: 90 tablet; Refill: 5    Gastroesophageal reflux disease without esophagitis  -     Ambulatory referral/consult to Gastroenterology  -     Ambulatory referral/consult to Endo Procedure     Epigastric pain  -     Ambulatory referral/consult to Gastroenterology  -     Ambulatory referral/consult to Endo Procedure     Follow up to be determined after results/ procedure(s).    Thank you so much for allowing me to participate in the care of JORGE L Bishop

## 2024-08-27 ENCOUNTER — HOSPITAL ENCOUNTER (OUTPATIENT)
Dept: PREADMISSION TESTING | Facility: HOSPITAL | Age: 59
Discharge: HOME | End: 2024-08-27
Attending: INTERNAL MEDICINE
Payer: COMMERCIAL

## 2024-08-27 DIAGNOSIS — R10.13 EPIGASTRIC PAIN: ICD-10-CM

## 2024-08-27 DIAGNOSIS — K21.9 GASTROESOPHAGEAL REFLUX DISEASE, UNSPECIFIED WHETHER ESOPHAGITIS PRESENT: Primary | ICD-10-CM

## 2024-08-28 ENCOUNTER — PATIENT MESSAGE (OUTPATIENT)
Dept: INTERNAL MEDICINE | Facility: CLINIC | Age: 59
End: 2024-08-28
Payer: COMMERCIAL

## 2024-08-28 ENCOUNTER — TELEPHONE (OUTPATIENT)
Dept: INTERNAL MEDICINE | Facility: CLINIC | Age: 59
End: 2024-08-28
Payer: COMMERCIAL

## 2024-08-28 DIAGNOSIS — E07.9 THYROID MASS: ICD-10-CM

## 2024-08-28 DIAGNOSIS — I25.10 ATHEROSCLEROSIS OF CORONARY ARTERY, UNSPECIFIED VESSEL OR LESION TYPE, UNSPECIFIED WHETHER ANGINA PRESENT, UNSPECIFIED WHETHER NATIVE OR TRANSPLANTED HEART: Primary | ICD-10-CM

## 2024-08-28 NOTE — TELEPHONE ENCOUNTER
----- Message from Sepideh Engle sent at 8/28/2024  3:55 PM CDT -----  Contact: brisa  Type:  Patient Returning Call    Who Called:brisa  Who Left Message for Patient:jamey  Does the patient know what this is regarding?:missed call  Would the patient rather a call back or a response via [facility]? call  Best Call Back Number:[phone]   Additional Information:

## 2024-08-29 ENCOUNTER — HOSPITAL ENCOUNTER (OUTPATIENT)
Dept: RADIOLOGY | Facility: HOSPITAL | Age: 59
Discharge: HOME | End: 2024-08-29
Attending: FAMILY MEDICINE
Payer: COMMERCIAL

## 2024-08-29 DIAGNOSIS — E04.1 THYROID NODULE: Primary | ICD-10-CM

## 2024-08-29 DIAGNOSIS — E07.9 THYROID MASS: ICD-10-CM

## 2024-08-29 PROCEDURE — 76536 US EXAM OF HEAD AND NECK: CPT | Mod: 26,,, | Performed by: STUDENT IN AN ORGANIZED HEALTH CARE EDUCATION/TRAINING PROGRAM

## 2024-08-29 PROCEDURE — 76536 US EXAM OF HEAD AND NECK: CPT | Mod: TC

## 2024-09-03 DIAGNOSIS — R94.31 EKG ABNORMALITIES: Primary | ICD-10-CM

## 2024-09-05 ENCOUNTER — PATIENT MESSAGE (OUTPATIENT)
Dept: OTOLARYNGOLOGY | Facility: CLINIC | Age: 59
End: 2024-09-05

## 2024-09-05 ENCOUNTER — OFFICE VISIT (OUTPATIENT)
Dept: OTOLARYNGOLOGY | Facility: CLINIC | Age: 59
End: 2024-09-05
Payer: COMMERCIAL

## 2024-09-05 DIAGNOSIS — E04.1 THYROID NODULE: ICD-10-CM

## 2024-09-05 PROCEDURE — 99204 OFFICE O/P NEW MOD 45 MIN: CPT | Mod: 95,,, | Performed by: OTOLARYNGOLOGY

## 2024-09-05 PROCEDURE — 3044F HG A1C LEVEL LT 7.0%: CPT | Mod: CPTII,95,, | Performed by: OTOLARYNGOLOGY

## 2024-09-05 NOTE — PROGRESS NOTES
Visit type: Virtual visit with synchronous audio and video  The patient location is: [place], [place]  Total time spent with patient: 15 min  Each patient to whom he or she provides medical services by telemedicine is:  (1) informed of the relationship between the physician and patient and the respective role of any other health care provider with respect to management of the patient; and (2) notified that he or she may decline to receive medical services by telemedicine and may withdraw from such care at any time.    Subjective:    Patient: Dimas Darden [id number], :1965    Chief Complaint: thyroid nodule    HPI:  58-year-old gentleman presents for evaluation of abnormal thyroid ultrasound.  He recently had a screening chest CT ordered by Dr. Dang.   The CT demonstrated multiple thyroid nodules and a subsequent thyroid ultrasound was ordered.  The thyroid ultrasound demonstrated a very large nodule on the right side and several smaller nodules on the left that meet FNA criteria.  The patient does report some possible compressive symptoms when he extends his neck.  He reports occasional dysphagia but this is not consistent.  He is not sure if he has any family history of thyroid issues or thyroid cancer.        Objective:    Physical Exam:  [x] Well developed  [x] Well nourished  [x] Oriented x3  [x] Alert with normal mood and affect.   [x] Extraocular motions intact  [x] No hoarseness   [x] No dysphonia  [x] No masses/lesions of face or neck    Independent Review:  Large 5cm right mixed solid/cystic nodule and small 1cm left nodule with calcifications, both meet FNA criteria  US Thyroid  Order: [id number] - Reflex for Order [id number]  Status: Final result       Visible to patient: Yes (seen)       Next appt: 2024 at 02:00 PM in Cardiology (Terence Dave MD)       Dx: Thyroid mass    2 Result Notes       1 Patient Communication  Details    Reading Physician Reading Date Result Priority    Raquel Carlin MD  [phone] 8/29/2024      Narrative & Impression  EXAM: US THYROID     CLINICAL HISTORY:   [E07.9]-Disorder of thyroid, unspecified.     COMPARISON: CT chest low-dose lung cancer screening 08/23/2024 (images only)     TECHNIQUE: Grayscale and color Doppler evaluation of the thyroid was performed.     FINDINGS:  The thyroid is enlarged.  The right lobe of the thyroid measures 6.8 x 2.8 x 2.3 cm. The left lobe measures 5.1 x 2.5 x 1.6 cm.  The isthmus measures 8 mm in thickness.  Thyroid parenchyma is mildly heterogeneous with bilateral nodules as follows:     Right:  5.0 x 3.6 x 4.1 cm mid to inferior predominantly solid very hypoechoic nodule with cystic components and macrocalcification and extrathyroidal extension, TR 5.  Meets criteria for FNA.     Left:  1.0 x 0.6 x 0.7 cm mid very hypoechoic solid nodule with peripheral calcification, TR 5.  Meets criteria for FNA.  0.6 x 0.4 x 0.7 cm inferior very hypoechoic solid nodule, TR 4.        Impression:     Multinodular thyroid gland.  A large 5.0 cm right nodule and small 1.0 cm left nodule meet criteria for fine-needle aspiration.     Report Date: 8/29/2024 4:30 PM     Recent TSH WNL    Assessment & Plan:  Diagnoses and all orders for this visit:    Thyroid nodule  -     Ambulatory referral/consult to ENT  -     US FNA Biopsy w/ Imaging 1st Lesion; Future        We discussed the findings of his CT chest and thyroid ultrasound as well as his recent thyroid function tests.  I recommended moving forward with a fine-needle aspiration biopsy of both the large right thyroid nodule and the smaller left thyroid nodule.  We discussed possible outcomes including continued observation as well as the possibility of a blaise versus total thyroidectomy.  We will contact him with the results when they are available

## 2024-09-05 NOTE — H&P (VIEW-ONLY)
Visit type: Virtual visit with synchronous audio and video  The patient location is: [place], [place]  Total time spent with patient: 15 min  Each patient to whom he or she provides medical services by telemedicine is:  (1) informed of the relationship between the physician and patient and the respective role of any other health care provider with respect to management of the patient; and (2) notified that he or she may decline to receive medical services by telemedicine and may withdraw from such care at any time.    Subjective:    Patient: Dimas Darden [id number], :1965    Chief Complaint: thyroid nodule    HPI:  58-year-old gentleman presents for evaluation of abnormal thyroid ultrasound.  He recently had a screening chest CT ordered by Dr. Dang.   The CT demonstrated multiple thyroid nodules and a subsequent thyroid ultrasound was ordered.  The thyroid ultrasound demonstrated a very large nodule on the right side and several smaller nodules on the left that meet FNA criteria.  The patient does report some possible compressive symptoms when he extends his neck.  He reports occasional dysphagia but this is not consistent.  He is not sure if he has any family history of thyroid issues or thyroid cancer.        Objective:    Physical Exam:  [x] Well developed  [x] Well nourished  [x] Oriented x3  [x] Alert with normal mood and affect.   [x] Extraocular motions intact  [x] No hoarseness   [x] No dysphonia  [x] No masses/lesions of face or neck    Independent Review:  Large 5cm right mixed solid/cystic nodule and small 1cm left nodule with calcifications, both meet FNA criteria  US Thyroid  Order: [id number] - Reflex for Order [id number]  Status: Final result       Visible to patient: Yes (seen)       Next appt: 2024 at 02:00 PM in Cardiology (Terence Dave MD)       Dx: Thyroid mass    2 Result Notes       1 Patient Communication  Details    Reading Physician Reading Date Result Priority    Raquel Carlin MD  [phone] 8/29/2024      Narrative & Impression  EXAM: US THYROID     CLINICAL HISTORY:   [E07.9]-Disorder of thyroid, unspecified.     COMPARISON: CT chest low-dose lung cancer screening 08/23/2024 (images only)     TECHNIQUE: Grayscale and color Doppler evaluation of the thyroid was performed.     FINDINGS:  The thyroid is enlarged.  The right lobe of the thyroid measures 6.8 x 2.8 x 2.3 cm. The left lobe measures 5.1 x 2.5 x 1.6 cm.  The isthmus measures 8 mm in thickness.  Thyroid parenchyma is mildly heterogeneous with bilateral nodules as follows:     Right:  5.0 x 3.6 x 4.1 cm mid to inferior predominantly solid very hypoechoic nodule with cystic components and macrocalcification and extrathyroidal extension, TR 5.  Meets criteria for FNA.     Left:  1.0 x 0.6 x 0.7 cm mid very hypoechoic solid nodule with peripheral calcification, TR 5.  Meets criteria for FNA.  0.6 x 0.4 x 0.7 cm inferior very hypoechoic solid nodule, TR 4.        Impression:     Multinodular thyroid gland.  A large 5.0 cm right nodule and small 1.0 cm left nodule meet criteria for fine-needle aspiration.     Report Date: 8/29/2024 4:30 PM     Recent TSH WNL    Assessment & Plan:  Diagnoses and all orders for this visit:    Thyroid nodule  -     Ambulatory referral/consult to ENT  -     US FNA Biopsy w/ Imaging 1st Lesion; Future        We discussed the findings of his CT chest and thyroid ultrasound as well as his recent thyroid function tests.  I recommended moving forward with a fine-needle aspiration biopsy of both the large right thyroid nodule and the smaller left thyroid nodule.  We discussed possible outcomes including continued observation as well as the possibility of a blaise versus total thyroidectomy.  We will contact him with the results when they are available

## 2024-09-09 ENCOUNTER — OFFICE VISIT (OUTPATIENT)
Dept: CARDIOLOGY | Facility: CLINIC | Age: 59
End: 2024-09-09
Payer: COMMERCIAL

## 2024-09-09 VITALS
SYSTOLIC BLOOD PRESSURE: 130 MMHG | OXYGEN SATURATION: 95 % | HEART RATE: 91 BPM | WEIGHT: 268.5 LBS | DIASTOLIC BLOOD PRESSURE: 92 MMHG | BODY MASS INDEX: 34.48 KG/M2

## 2024-09-09 DIAGNOSIS — R06.09 DOE (DYSPNEA ON EXERTION): ICD-10-CM

## 2024-09-09 DIAGNOSIS — R73.03 PREDIABETES: ICD-10-CM

## 2024-09-09 DIAGNOSIS — R94.31 EKG ABNORMALITIES: ICD-10-CM

## 2024-09-09 DIAGNOSIS — I25.10 ATHEROSCLEROSIS OF CORONARY ARTERY, UNSPECIFIED VESSEL OR LESION TYPE, UNSPECIFIED WHETHER ANGINA PRESENT, UNSPECIFIED WHETHER NATIVE OR TRANSPLANTED HEART: ICD-10-CM

## 2024-09-09 DIAGNOSIS — E78.49 OTHER HYPERLIPIDEMIA: ICD-10-CM

## 2024-09-09 DIAGNOSIS — I10 PRIMARY HYPERTENSION: Primary | ICD-10-CM

## 2024-09-09 PROCEDURE — 3080F DIAST BP >= 90 MM HG: CPT | Mod: CPTII,S$GLB,, | Performed by: INTERNAL MEDICINE

## 2024-09-09 PROCEDURE — 1159F MED LIST DOCD IN RCRD: CPT | Mod: CPTII,S$GLB,, | Performed by: INTERNAL MEDICINE

## 2024-09-09 PROCEDURE — 99204 OFFICE O/P NEW MOD 45 MIN: CPT | Mod: S$GLB,,, | Performed by: INTERNAL MEDICINE

## 2024-09-09 PROCEDURE — 3008F BODY MASS INDEX DOCD: CPT | Mod: CPTII,S$GLB,, | Performed by: INTERNAL MEDICINE

## 2024-09-09 PROCEDURE — 3044F HG A1C LEVEL LT 7.0%: CPT | Mod: CPTII,S$GLB,, | Performed by: INTERNAL MEDICINE

## 2024-09-09 PROCEDURE — 3075F SYST BP GE 130 - 139MM HG: CPT | Mod: CPTII,S$GLB,, | Performed by: INTERNAL MEDICINE

## 2024-09-09 PROCEDURE — 4010F ACE/ARB THERAPY RXD/TAKEN: CPT | Mod: CPTII,S$GLB,, | Performed by: INTERNAL MEDICINE

## 2024-09-09 PROCEDURE — 99999 PR PBB SHADOW E&M-EST. PATIENT-LVL IV: CPT | Mod: PBBFAC,,, | Performed by: INTERNAL MEDICINE

## 2024-09-09 RX ORDER — VALSARTAN 80 MG/1
80 TABLET ORAL DAILY
Qty: 30 TABLET | Refills: 11 | Status: SHIPPED | OUTPATIENT
Start: 2024-09-09 | End: 2025-09-09

## 2024-09-09 NOTE — PROGRESS NOTES
Subjective:   Patient ID:  Dimas Darden is a 58 y.o. male who presents for evaluation of Establish Care      HPI  September 9, 2024.    58-year-old male recently referred by PCP for evaluation for CAD.    Past medical history as below.  Ex-smoker quit about 10 years ago.    He states that sometimes when he is working outside he feels extremely sweating and exhausted.    Denies any chest pain.    He feels sleepy sometimes during the day he does snore his wife thinks that he has sleep apnea.    Blood pressure elevated on multiple occasions.  On amlodipine 10 mg daily.    No palpitations syncope presyncope.      Past Medical History:   Diagnosis Date    Back pain     Digestive disorder     Disc     Bulging disc in back    Diverticulitis     Hypertension        Past Surgical History:   Procedure Laterality Date    COLONOSCOPY N/A 03/13/2020    Procedure: COLONOSCOPY;  Surgeon: Mat Guerrero MD;  Location: [facility];  Service: General;  Laterality: N/A;    ESOPHAGOGASTRODUODENOSCOPY N/A 04/15/2021    Procedure: EGD (ESOPHAGOGASTRODUODENOSCOPY) needs rapid COVID;  Surgeon: Josie Livingston MD;  Location: [facility];  Service: Endoscopy;  Laterality: N/A;    INJECTION OF ANESTHETIC AGENT INTO TISSUE PLANE DEFINED BY TRANSVERSUS ABDOMINIS MUSCLE N/A 06/08/2021    Procedure: BLOCK, TRANSVERSUS ABDOMINIS PLANE;  Surgeon: Mat Guerrero MD;  Location: [facility];  Service: General;  Laterality: N/A;    LAPAROSCOPIC SIGMOIDECTOMY N/A 06/08/2021    Procedure: COLECTOMY, SIGMOID, LAPAROSCOPIC;  Surgeon: Mat Guerrero MD;  Location: [facility];  Service: General;  Laterality: N/A;    MOBILIZATION OF SPLENIC FLEXURE N/A 06/08/2021    Procedure: MOBILIZATION, SPLENIC FLEXURE;  Surgeon: Mat Guerrero MD;  Location: [facility];  Service: General;  Laterality: N/A;  Laparoscopic    UNDESCENDED TESTICLE EXPLORATION Left     Age 15       Social History     Tobacco Use    Smoking status: Former     Current packs/day:  0.00     Average packs/day: 1 pack/day for 25.0 years (25.0 ttl pk-yrs)     Types: Cigarettes     Start date: 4/10/1987     Quit date: 4/10/2012     Years since quittin.4     Passive exposure: Never    Smokeless tobacco: Never   Substance Use Topics    Alcohol use: Not Currently     Alcohol/week: 2.0 standard drinks of alcohol     Comment: Socially on the weekend    Drug use: No       Family History   Problem Relation Name Age of Onset    Diverticulitis Mother      No Known Problems Father      No Known Problems Sister      No Known Problems Brother      No Known Problems Brother         Review of Systems   Cardiovascular:  Positive for dyspnea on exertion. Negative for chest pain, palpitations and syncope.   Respiratory:  Positive for snoring.    Genitourinary: Negative.    Neurological: Negative.        Current Outpatient Medications on File Prior to Visit   Medication Sig    amLODIPine (NORVASC) 5 MG tablet Take 2 tablets (10 mg total) by mouth once daily.    atorvastatin (LIPITOR) 20 MG tablet Take 1 tablet (20 mg total) by mouth once daily.    baclofen (LIORESAL) 10 MG tablet Take 1 tablet (10 mg total) by mouth 3 (three) times daily as needed.    dicyclomine (BENTYL) 20 mg tablet Take 1 tablet (20 mg total) by mouth 3 (three) times daily as needed (abdominal pain).    gabapentin (NEURONTIN) 300 MG capsule Take 1 capsule (300 mg total) by mouth nightly as needed (Restless leg).    pantoprazole (PROTONIX) 40 MG tablet Take 1 tablet (40 mg total) by mouth 2 (two) times daily.     No current facility-administered medications on file prior to visit.       Objective:   Objective:  Wt Readings from Last 3 Encounters:   24 121.8 kg (268 lb 8.3 oz)   24 121.1 kg (266 lb 15.6 oz)   08/15/24 121.1 kg (267 lb)     Temp Readings from Last 3 Encounters:   08/15/24 97 °F (36.1 °C)   07/15/24 98.1 °F (36.7 °C) (Oral)   22 98.8 °F (37.1 °C) (Tympanic)     BP Readings from Last 3 Encounters:   24  (!) 130/92   08/26/24 (!) 138/96   08/15/24 128/88     Pulse Readings from Last 3 Encounters:   09/09/24 91   08/26/24 94   08/15/24 95       Physical Exam  Vitals reviewed.   Constitutional:       Appearance: He is well-developed.   Neck:      Vascular: No carotid bruit.   Cardiovascular:      Rate and Rhythm: Normal rate and regular rhythm.      Pulses: Intact distal pulses.      Heart sounds: Normal heart sounds. No murmur heard.  Pulmonary:      Breath sounds: Normal breath sounds.   Neurological:      Mental Status: He is oriented to person, place, and time.         Lab Results   Component Value Date    CHOL 265 (H) 08/01/2024    CHOL 247 (H) 07/19/2022    CHOL 246 (H) 05/06/2021     Lab Results   Component Value Date    HDL 51 08/01/2024    HDL 49 07/19/2022    HDL 54 05/06/2021     Lab Results   Component Value Date    LDLCALC 170.0 (H) 08/01/2024    LDLCALC 163.4 (H) 07/19/2022    LDLCALC 156.8 05/06/2021     Lab Results   Component Value Date    TRIG 220 (H) 08/01/2024    TRIG 173 (H) 07/19/2022    TRIG 176 (H) 05/06/2021     Lab Results   Component Value Date    CHOLHDL 19.2 (L) 08/01/2024    CHOLHDL 19.8 (L) 07/19/2022    CHOLHDL 22.0 05/06/2021       Chemistry        Component Value Date/Time     07/15/2024 1603    K 4.0 07/15/2024 1603     07/15/2024 1603    CO2 23 07/15/2024 1603    BUN 18 07/15/2024 1603    CREATININE 0.9 08/06/2024 1515     07/15/2024 1603        Component Value Date/Time    CALCIUM 9.6 07/15/2024 1603    ALKPHOS 69 07/15/2024 1603    AST 20 07/15/2024 1603    ALT 34 07/15/2024 1603    BILITOT 1.1 (H) 07/15/2024 1603    ESTGFRAFRICA >60.0 07/19/2022 1022    EGFRNONAA >60.0 07/19/2022 1022          Lab Results   Component Value Date    TSH 2.323 08/01/2024     Lab Results   Component Value Date    INR 1.0 12/27/2016     Lab Results   Component Value Date    WBC 10.23 07/15/2024    HGB 15.2 07/15/2024    HCT 45.8 07/15/2024    MCV 89 07/15/2024     07/15/2024      BNP  @LABRCNTIP(BNP,BNPTRIAGEBLO)@  CrCl cannot be calculated (Patient's most recent lab result is older than the maximum 7 days allowed.).     Imaging:  ======    No results found for this or any previous visit.    No results found for this or any previous visit.    Results for orders placed during the hospital encounter of 05/24/21    X-Ray Chest PA And Lateral    Narrative  EXAMINATION:  XR CHEST PA AND LATERAL    CLINICAL HISTORY:  Encounter for other preprocedural examination    TECHNIQUE:  PA and lateral views of the chest were performed.    COMPARISON:  March 4, 2020    FINDINGS:  Persistent and slightly more pronounced areas of scarring and or subsegmental atelectasis without consolidation, effusion or pneumothorax.  CP angles clear and trachea midline.  Heart and pulmonary vasculature within normal limits.  Mild spondylosis.    Impression  Bibasilar areas of scarring and or subsegmental atelectasis slightly more pronounced on present exam.    No consolidation, effusion or pneumothorax.      Electronically signed by: Porfirio Ferreira MD  Date:    05/24/2021  Time:    13:08    No results found for this or any previous visit.    No valid procedures specified.    No results found for this or any previous visit.      No results found for this or any previous visit.      No results found for this or any previous visit.      Diagnostic Results:  ECG: Reviewed  Normal sinus rhythm   Left anterior fascicular block   No significant change was found   When compared with ECG of 24-MAY-2021 13:21,   Incomplete right bundle branch block is no longer Present   Confirmed by TANIA OLVERA, VIDA GARCIA ([id number]) on 3/22/2022 5:03:41 AM     The 10-year ASCVD risk score (Noreen CAMP, et al., 2019) is: 10.5%    Values used to calculate the score:      Age: 58 years      Sex: Male      Is Non- : No      Diabetic: No      Tobacco smoker: No      Systolic Blood Pressure: 130 mmHg      Is BP treated: Yes      HDL  Cholesterol: 51 mg/dL      Total Cholesterol: 265 mg/dL        Assessment and Plan:   Primary hypertension  -     valsartan (DIOVAN) 80 MG tablet; Take 1 tablet (80 mg total) by mouth once daily.  Dispense: 30 tablet; Refill: 11    Atherosclerosis of coronary artery, unspecified vessel or lesion type, unspecified whether angina present, unspecified whether native or transplanted heart  -     Ambulatory referral/consult to Cardiology  -     Ambulatory referral/consult to Sleep Disorders; Future; Expected date: 09/16/2024  -     Stress Echo Which stress agent will be used? Treadmill Exercise; Color Flow Doppler? No; Future    DUNCAN (dyspnea on exertion)  -     Stress Echo Which stress agent will be used? Treadmill Exercise; Color Flow Doppler? No; Future    EKG abnormalities    Other hyperlipidemia    Prediabetes    BMI 34.0-34.9,adult      Reviewed CT scan with mild coronary atherosclerosis.  We will get a stress echo last stress echo was normal 2020.    EKG shows left anterior fascicular block.    Will add Diovan BP not at goal.  Needs a sleep study he has stigmata of sleep apnea.  Will refer to [facility] Disorder [facility].    Encouraged to start on statin.    Reviewed all tests and above medical conditions with patient in detail and formulated treatment plan.  Risk factor modification discussed.   Cardiac low salt diet discussed.  Maintaining healthy weight and weight loss goals were discussed in clinic.    Follow up in 3 months after results

## 2024-09-13 ENCOUNTER — HOSPITAL ENCOUNTER (OUTPATIENT)
Dept: RADIOLOGY | Facility: HOSPITAL | Age: 59
Discharge: HOME | End: 2024-09-13
Attending: OTOLARYNGOLOGY
Payer: COMMERCIAL

## 2024-09-13 DIAGNOSIS — E04.1 THYROID NODULE: ICD-10-CM

## 2024-09-13 PROCEDURE — C1729 CATH, DRAINAGE: HCPCS

## 2024-09-13 PROCEDURE — 88173 CYTOPATH EVAL FNA REPORT: CPT | Mod: 26,,, | Performed by: STUDENT IN AN ORGANIZED HEALTH CARE EDUCATION/TRAINING PROGRAM

## 2024-09-13 PROCEDURE — 10005 FNA BX W/US GDN 1ST LES: CPT | Mod: ,,, | Performed by: RADIOLOGY

## 2024-09-13 PROCEDURE — 10005 FNA BX W/US GDN 1ST LES: CPT

## 2024-09-13 PROCEDURE — 88173 CYTOPATH EVAL FNA REPORT: CPT | Performed by: STUDENT IN AN ORGANIZED HEALTH CARE EDUCATION/TRAINING PROGRAM

## 2024-09-17 ENCOUNTER — TELEPHONE (OUTPATIENT)
Dept: OTOLARYNGOLOGY | Facility: CLINIC | Age: 59
End: 2024-09-17
Payer: COMMERCIAL

## 2024-09-17 ENCOUNTER — HOSPITAL ENCOUNTER (OUTPATIENT)
Dept: RADIOLOGY | Facility: HOSPITAL | Age: 59
Discharge: HOME | End: 2024-09-17
Attending: UROLOGY
Payer: COMMERCIAL

## 2024-09-17 DIAGNOSIS — R97.20 ELEVATED PSA: ICD-10-CM

## 2024-09-17 LAB
FINAL PATHOLOGIC DIAGNOSIS: NORMAL
Lab: NORMAL

## 2024-09-17 PROCEDURE — 25500020 PHARM REV CODE 255: Performed by: UROLOGY

## 2024-09-17 PROCEDURE — A9585 GADOBUTROL INJECTION: HCPCS | Performed by: UROLOGY

## 2024-09-17 PROCEDURE — 72197 MRI PELVIS W/O & W/DYE: CPT | Mod: TC

## 2024-09-17 PROCEDURE — 72197 MRI PELVIS W/O & W/DYE: CPT | Mod: 26,,, | Performed by: RADIOLOGY

## 2024-09-17 RX ORDER — GADOBUTROL 604.72 MG/ML
10 INJECTION INTRAVENOUS
Status: COMPLETED | OUTPATIENT
Start: 2024-09-17 | End: 2024-09-17

## 2024-09-17 RX ADMIN — GADOBUTROL 10 ML: 604.72 INJECTION INTRAVENOUS at 03:09

## 2024-09-17 NOTE — TELEPHONE ENCOUNTER
----- Message from Kyle Elliott MD sent at 9/17/2024  4:05 PM CDT -----  Good news.  Your thyroid biopsy returned as benign (not cancer).  I would recommend another thyroid ultrasound in 1 year and I have included my nursing staff on this message to help set that up.  Let me know if you have any questions!

## 2024-09-18 ENCOUNTER — TELEPHONE (OUTPATIENT)
Dept: INTERNAL MEDICINE | Facility: CLINIC | Age: 59
End: 2024-09-18
Payer: COMMERCIAL

## 2024-09-18 RX ORDER — PRAVASTATIN SODIUM 20 MG/1
20 TABLET ORAL DAILY
Qty: 90 TABLET | Refills: 1 | Status: SHIPPED | OUTPATIENT
Start: 2024-09-18 | End: 2025-09-18

## 2024-09-18 NOTE — TELEPHONE ENCOUNTER
Patient messaged. Concern of adverse effect of med.  Statin  On med 2'2 The 10-year ASCVD risk score (Noreen CAMP, et al., 2019) is: 10.5%        Lower extremity /ankle pain w med      Patient discontinued the medication.  He found that the pain was alleviated.      Advise he start taking vitamin-D supplementation on daily basis at least 1000 IU daily.      Advise he continue to hold off on statin usage to allow him an opportunity to evaluate for complete resolution of pain.    I would like to trial an alternative statin before we discontinue drug class altogether.  We will trial him on pravastatin.      Advise he start the medication again in a couple of weeks if the pain has resolved.      If patient does experienced a return of pain we will consider him intolerant of statin therapy and we will pursue alternative drug classes    Patient agreed and understood

## 2024-09-20 ENCOUNTER — OFFICE VISIT (OUTPATIENT)
Dept: UROLOGY | Facility: CLINIC | Age: 59
End: 2024-09-20
Payer: COMMERCIAL

## 2024-09-20 VITALS
DIASTOLIC BLOOD PRESSURE: 92 MMHG | SYSTOLIC BLOOD PRESSURE: 132 MMHG | BODY MASS INDEX: 34.23 KG/M2 | HEIGHT: 74 IN | HEART RATE: 92 BPM | WEIGHT: 266.75 LBS

## 2024-09-20 DIAGNOSIS — R97.20 ELEVATED PSA: Primary | ICD-10-CM

## 2024-09-20 DIAGNOSIS — N13.8 BPH WITH OBSTRUCTION/LOWER URINARY TRACT SYMPTOMS: ICD-10-CM

## 2024-09-20 DIAGNOSIS — N40.1 BPH WITH OBSTRUCTION/LOWER URINARY TRACT SYMPTOMS: ICD-10-CM

## 2024-09-20 PROCEDURE — 99999 PR PBB SHADOW E&M-EST. PATIENT-LVL III: CPT | Mod: PBBFAC,,, | Performed by: UROLOGY

## 2024-09-20 NOTE — PROGRESS NOTES
Chief Complaint:   Encounter Diagnoses   Name Primary?    Elevated PSA Yes    BPH with obstruction/lower urinary tract symptoms        HPI:  HPI Dimas Darden tristan 58 y.o. male who presents with follow up to elevated PSA.  He had a prostate health index which showed his PSA had come down a little bit at 5.0.  His overall risk was about 1 in 3.  He did have an MRI of his prostate showing a 64 g gland with a PI-RADS 2.  He returns today for follow up.  He does have moderate lower urinary tract symptoms.  These are not too bothersome to him.    History:  Social History     Tobacco Use    Smoking status: Former     Current packs/day: 0.00     Average packs/day: 1 pack/day for 25.0 years (25.0 ttl pk-yrs)     Types: Cigarettes     Start date: 4/10/1987     Quit date: 4/10/2012     Years since quittin.4     Passive exposure: Never    Smokeless tobacco: Never   Substance Use Topics    Alcohol use: Not Currently     Alcohol/week: 2.0 standard drinks of alcohol     Comment: Socially on the weekend    Drug use: No     Past Medical History:   Diagnosis Date    Back pain     Digestive disorder     Disc     Bulging disc in back    Diverticulitis     Hypertension      Past Surgical History:   Procedure Laterality Date    COLONOSCOPY N/A 2020    Procedure: COLONOSCOPY;  Surgeon: Mat Guerrero MD;  Location: [facility];  Service: General;  Laterality: N/A;    ESOPHAGOGASTRODUODENOSCOPY N/A 04/15/2021    Procedure: EGD (ESOPHAGOGASTRODUODENOSCOPY) needs rapid COVID;  Surgeon: Josie Livingston MD;  Location: [facility] ENDO;  Service: Endoscopy;  Laterality: N/A;    INJECTION OF ANESTHETIC AGENT INTO TISSUE PLANE DEFINED BY TRANSVERSUS ABDOMINIS MUSCLE N/A 2021    Procedure: BLOCK, TRANSVERSUS ABDOMINIS PLANE;  Surgeon: Mat Guerrero MD;  Location: [facility] OR;  Service: General;  Laterality: N/A;    LAPAROSCOPIC SIGMOIDECTOMY N/A 2021    Procedure: COLECTOMY, SIGMOID, LAPAROSCOPIC;  Surgeon: Mat GRECO  "MD Yolanda;  Location: [facility];  Service: General;  Laterality: N/A;    MOBILIZATION OF SPLENIC FLEXURE N/A 06/08/2021    Procedure: MOBILIZATION, SPLENIC FLEXURE;  Surgeon: Mat Guerrero MD;  Location: [facility];  Service: General;  Laterality: N/A;  Laparoscopic    UNDESCENDED TESTICLE EXPLORATION Left     Age 15     Family History   Problem Relation Name Age of Onset    Diverticulitis Mother      No Known Problems Father      No Known Problems Sister      No Known Problems Brother      No Known Problems Brother         Current Outpatient Medications on File Prior to Visit   Medication Sig Dispense Refill    amLODIPine (NORVASC) 5 MG tablet Take 2 tablets (10 mg total) by mouth once daily. 180 tablet 1    baclofen (LIORESAL) 10 MG tablet Take 1 tablet (10 mg total) by mouth 3 (three) times daily as needed. 90 tablet 0    dicyclomine (BENTYL) 20 mg tablet Take 1 tablet (20 mg total) by mouth 3 (three) times daily as needed (abdominal pain). 90 tablet 5    gabapentin (NEURONTIN) 300 MG capsule Take 1 capsule (300 mg total) by mouth nightly as needed (Restless leg). 90 capsule 0    pantoprazole (PROTONIX) 40 MG tablet Take 1 tablet (40 mg total) by mouth 2 (two) times daily. 180 tablet 1    pravastatin (PRAVACHOL) 20 MG tablet Take 1 tablet (20 mg total) by mouth once daily. 90 tablet 1    valsartan (DIOVAN) 80 MG tablet Take 1 tablet (80 mg total) by mouth once daily. 30 tablet 11     No current facility-administered medications on file prior to visit.        Objective:     Vitals:    09/20/24 0843   BP: (!) 132/92   Pulse: 92   Weight: 121 kg (266 lb 12.1 oz)   Height: 6' 2" (1.88 m)      BMI Readings from Last 1 Encounters:   09/20/24 34.25 kg/m²          Physical Exam  No acute distress alert and oriented   Respirations even unlabored   Abdomen is obese      Lab Results   Component Value Date    PSA 5.5 (H) 08/01/2024        Lab Results   Component Value Date    CREATININE 0.9 08/06/2024      Assessment:     "   1. Elevated PSA    2. BPH with obstruction/lower urinary tract symptoms        Plan:     1. Elevated PSA    2. BPH with obstruction/lower urinary tract symptoms       Orders Placed This Encounter    Prostate Specific Antigen, Diagnostic      Elevated PSA with workup showing enlarged prostate likely the source of the PSA elevation.  MRI shows PI-RADS 2 with 65 g gland.  I recommend observation.  We will repeat PSA in 6 months.  Patient was having moderate symptoms which are not too bothersome.  We will hold off on treating a lower urinary tract symptoms per his request.

## 2024-09-27 ENCOUNTER — TELEPHONE (OUTPATIENT)
Dept: INTERNAL MEDICINE | Facility: CLINIC | Age: 59
End: 2024-09-27
Payer: COMMERCIAL

## 2024-09-30 ENCOUNTER — PATIENT MESSAGE (OUTPATIENT)
Dept: INTERNAL MEDICINE | Facility: CLINIC | Age: 59
End: 2024-09-30
Payer: COMMERCIAL

## 2024-09-30 ENCOUNTER — TELEPHONE (OUTPATIENT)
Dept: INTERNAL MEDICINE | Facility: CLINIC | Age: 59
End: 2024-09-30
Payer: COMMERCIAL

## 2024-09-30 VITALS — SYSTOLIC BLOOD PRESSURE: 112 MMHG | DIASTOLIC BLOOD PRESSURE: 70 MMHG

## 2024-10-02 ENCOUNTER — PATIENT MESSAGE (OUTPATIENT)
Dept: INTERNAL MEDICINE | Facility: CLINIC | Age: 59
End: 2024-10-02
Payer: COMMERCIAL

## 2024-10-07 DIAGNOSIS — I10 ESSENTIAL HYPERTENSION: ICD-10-CM

## 2024-10-07 RX ORDER — AMLODIPINE BESYLATE 5 MG/1
10 TABLET ORAL DAILY
Qty: 180 TABLET | Refills: 1 | Status: SHIPPED | OUTPATIENT
Start: 2024-10-07

## 2024-12-02 RX ORDER — BACLOFEN 10 MG/1
10 TABLET ORAL 3 TIMES DAILY PRN
Qty: 270 TABLET | Refills: 0 | Status: SHIPPED | OUTPATIENT
Start: 2024-12-02 | End: 2025-12-02

## 2024-12-03 ENCOUNTER — PATIENT MESSAGE (OUTPATIENT)
Dept: CARDIOLOGY | Facility: HOSPITAL | Age: 59
End: 2024-12-03
Payer: COMMERCIAL

## 2024-12-05 ENCOUNTER — LAB VISIT (OUTPATIENT)
Dept: LAB | Facility: HOSPITAL | Age: 59
End: 2024-12-05
Attending: FAMILY MEDICINE
Payer: COMMERCIAL

## 2024-12-05 DIAGNOSIS — R74.8 ELEVATED CREATINE KINASE: ICD-10-CM

## 2024-12-05 DIAGNOSIS — R73.03 PREDIABETES: ICD-10-CM

## 2024-12-05 DIAGNOSIS — I10 ESSENTIAL HYPERTENSION: ICD-10-CM

## 2024-12-05 LAB
ALBUMIN SERPL BCP-MCNC: 4.2 G/DL (ref 3.5–5.2)
ALP SERPL-CCNC: 67 U/L (ref 40–150)
ALT SERPL W/O P-5'-P-CCNC: 75 U/L (ref 10–44)
ANION GAP SERPL CALC-SCNC: 10 MMOL/L (ref 8–16)
AST SERPL-CCNC: 43 U/L (ref 10–40)
BILIRUB SERPL-MCNC: 0.5 MG/DL (ref 0.1–1)
BUN SERPL-MCNC: 21 MG/DL (ref 6–20)
CALCIUM SERPL-MCNC: 9.8 MG/DL (ref 8.7–10.5)
CHLORIDE SERPL-SCNC: 106 MMOL/L (ref 95–110)
CK SERPL-CCNC: 204 U/L (ref 20–200)
CO2 SERPL-SCNC: 25 MMOL/L (ref 23–29)
CREAT SERPL-MCNC: 1.2 MG/DL (ref 0.5–1.4)
EST. GFR  (NO RACE VARIABLE): >60 ML/MIN/1.73 M^2
ESTIMATED AVG GLUCOSE: 120 MG/DL (ref 68–131)
GLUCOSE SERPL-MCNC: 104 MG/DL (ref 70–110)
HBA1C MFR BLD: 5.8 % (ref 4–5.6)
POTASSIUM SERPL-SCNC: 4.6 MMOL/L (ref 3.5–5.1)
PROT SERPL-MCNC: 7.5 G/DL (ref 6–8.4)
SODIUM SERPL-SCNC: 141 MMOL/L (ref 136–145)

## 2024-12-05 PROCEDURE — 80053 COMPREHEN METABOLIC PANEL: CPT | Performed by: FAMILY MEDICINE

## 2024-12-05 PROCEDURE — 82550 ASSAY OF CK (CPK): CPT | Performed by: FAMILY MEDICINE

## 2024-12-05 PROCEDURE — 83036 HEMOGLOBIN GLYCOSYLATED A1C: CPT | Performed by: FAMILY MEDICINE

## 2024-12-10 ENCOUNTER — PATIENT MESSAGE (OUTPATIENT)
Dept: INTERNAL MEDICINE | Facility: CLINIC | Age: 59
End: 2024-12-10
Payer: COMMERCIAL

## 2024-12-16 ENCOUNTER — PATIENT MESSAGE (OUTPATIENT)
Dept: INTERNAL MEDICINE | Facility: CLINIC | Age: 59
End: 2024-12-16
Payer: COMMERCIAL

## 2025-01-06 ENCOUNTER — PATIENT MESSAGE (OUTPATIENT)
Dept: INTERNAL MEDICINE | Facility: CLINIC | Age: 60
End: 2025-01-06
Payer: COMMERCIAL

## 2025-01-30 ENCOUNTER — OFFICE VISIT (OUTPATIENT)
Dept: INTERNAL MEDICINE | Facility: CLINIC | Age: 60
End: 2025-01-30
Payer: COMMERCIAL

## 2025-01-30 VITALS
TEMPERATURE: 97 F | WEIGHT: 283 LBS | BODY MASS INDEX: 36.34 KG/M2 | OXYGEN SATURATION: 97 % | DIASTOLIC BLOOD PRESSURE: 86 MMHG | HEART RATE: 96 BPM | SYSTOLIC BLOOD PRESSURE: 138 MMHG | RESPIRATION RATE: 18 BRPM

## 2025-01-30 DIAGNOSIS — E66.01 SEVERE OBESITY (BMI 35.0-39.9) WITH COMORBIDITY: Primary | ICD-10-CM

## 2025-01-30 DIAGNOSIS — R73.03 PREDIABETES: ICD-10-CM

## 2025-01-30 DIAGNOSIS — I10 ESSENTIAL HYPERTENSION: ICD-10-CM

## 2025-01-30 DIAGNOSIS — R74.8 ELEVATED CK: ICD-10-CM

## 2025-01-30 DIAGNOSIS — R25.2 MUSCLE CRAMPS: ICD-10-CM

## 2025-01-30 DIAGNOSIS — I10 PRIMARY HYPERTENSION: ICD-10-CM

## 2025-01-30 DIAGNOSIS — R79.89 ELEVATED LIVER FUNCTION TESTS: ICD-10-CM

## 2025-01-30 PROCEDURE — 99214 OFFICE O/P EST MOD 30 MIN: CPT | Mod: S$GLB,,, | Performed by: FAMILY MEDICINE

## 2025-01-30 PROCEDURE — G2211 COMPLEX E/M VISIT ADD ON: HCPCS | Mod: S$GLB,,, | Performed by: FAMILY MEDICINE

## 2025-01-30 PROCEDURE — 99999 PR PBB SHADOW E&M-EST. PATIENT-LVL III: CPT | Mod: PBBFAC,,, | Performed by: FAMILY MEDICINE

## 2025-01-30 PROCEDURE — 3079F DIAST BP 80-89 MM HG: CPT | Mod: CPTII,S$GLB,, | Performed by: FAMILY MEDICINE

## 2025-01-30 PROCEDURE — 4010F ACE/ARB THERAPY RXD/TAKEN: CPT | Mod: CPTII,S$GLB,, | Performed by: FAMILY MEDICINE

## 2025-01-30 PROCEDURE — 3075F SYST BP GE 130 - 139MM HG: CPT | Mod: CPTII,S$GLB,, | Performed by: FAMILY MEDICINE

## 2025-01-30 PROCEDURE — 3008F BODY MASS INDEX DOCD: CPT | Mod: CPTII,S$GLB,, | Performed by: FAMILY MEDICINE

## 2025-01-30 PROCEDURE — 1159F MED LIST DOCD IN RCRD: CPT | Mod: CPTII,S$GLB,, | Performed by: FAMILY MEDICINE

## 2025-01-30 RX ORDER — VALSARTAN 80 MG/1
80 TABLET ORAL DAILY
Qty: 90 TABLET | Refills: 1 | Status: SHIPPED | OUTPATIENT
Start: 2025-01-30 | End: 2026-01-30

## 2025-01-30 RX ORDER — METFORMIN HYDROCHLORIDE 500 MG/1
500 TABLET ORAL 2 TIMES DAILY WITH MEALS
Qty: 90 TABLET | Refills: 1 | Status: SHIPPED | OUTPATIENT
Start: 2025-01-30 | End: 2026-01-30

## 2025-01-30 RX ORDER — AMLODIPINE BESYLATE 10 MG/1
10 TABLET ORAL DAILY
Qty: 90 TABLET | Refills: 3 | Status: SHIPPED | OUTPATIENT
Start: 2025-01-30 | End: 2026-01-30

## 2025-01-30 RX ORDER — ROPINIROLE 0.25 MG/1
TABLET, FILM COATED ORAL
Qty: 180 TABLET | Refills: 1 | Status: SHIPPED | OUTPATIENT
Start: 2025-01-30

## 2025-01-30 RX ORDER — PRAVASTATIN SODIUM 20 MG/1
20 TABLET ORAL DAILY
Qty: 90 TABLET | Refills: 1 | Status: SHIPPED | OUTPATIENT
Start: 2025-01-30 | End: 2026-01-30

## 2025-01-30 RX ORDER — TAMSULOSIN HYDROCHLORIDE 0.4 MG/1
0.4 CAPSULE ORAL DAILY
Qty: 90 CAPSULE | Refills: 1 | Status: SHIPPED | OUTPATIENT
Start: 2025-01-30 | End: 2026-01-30
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
## 2025-02-06 ENCOUNTER — LAB VISIT (OUTPATIENT)
Dept: LAB | Facility: HOSPITAL | Age: 60
End: 2025-02-06
Attending: FAMILY MEDICINE
Payer: COMMERCIAL

## 2025-02-06 DIAGNOSIS — R25.2 MUSCLE CRAMPS: ICD-10-CM

## 2025-02-06 DIAGNOSIS — R74.8 ELEVATED CK: ICD-10-CM

## 2025-02-06 LAB
ALBUMIN SERPL BCP-MCNC: 4.1 G/DL (ref 3.5–5.2)
ALBUMIN SERPL BCP-MCNC: 4.1 G/DL (ref 3.5–5.2)
ALP SERPL-CCNC: 62 U/L (ref 40–150)
ALP SERPL-CCNC: 62 U/L (ref 40–150)
ALT SERPL W/O P-5'-P-CCNC: 71 U/L (ref 10–44)
ALT SERPL W/O P-5'-P-CCNC: 71 U/L (ref 10–44)
ANION GAP SERPL CALC-SCNC: 14 MMOL/L (ref 8–16)
AST SERPL-CCNC: 41 U/L (ref 10–40)
AST SERPL-CCNC: 41 U/L (ref 10–40)
BILIRUB DIRECT SERPL-MCNC: 0.2 MG/DL (ref 0.1–0.3)
BILIRUB SERPL-MCNC: 0.5 MG/DL (ref 0.1–1)
BILIRUB SERPL-MCNC: 0.5 MG/DL (ref 0.1–1)
BUN SERPL-MCNC: 20 MG/DL (ref 6–20)
CALCIUM SERPL-MCNC: 9.7 MG/DL (ref 8.7–10.5)
CHLORIDE SERPL-SCNC: 103 MMOL/L (ref 95–110)
CK SERPL-CCNC: 206 U/L (ref 20–200)
CO2 SERPL-SCNC: 24 MMOL/L (ref 23–29)
CREAT SERPL-MCNC: 1.2 MG/DL (ref 0.5–1.4)
ERYTHROCYTE [SEDIMENTATION RATE] IN BLOOD BY WESTERGREN METHOD: 1 MM/HR (ref 0–10)
EST. GFR  (NO RACE VARIABLE): >60 ML/MIN/1.73 M^2
GLUCOSE SERPL-MCNC: 109 MG/DL (ref 70–110)
MAGNESIUM SERPL-MCNC: 1.8 MG/DL (ref 1.6–2.6)
POTASSIUM SERPL-SCNC: 4.2 MMOL/L (ref 3.5–5.1)
PROT SERPL-MCNC: 7.5 G/DL (ref 6–8.4)
PROT SERPL-MCNC: 7.5 G/DL (ref 6–8.4)
SODIUM SERPL-SCNC: 141 MMOL/L (ref 136–145)

## 2025-02-06 PROCEDURE — 80053 COMPREHEN METABOLIC PANEL: CPT | Performed by: FAMILY MEDICINE

## 2025-02-06 PROCEDURE — 36415 COLL VENOUS BLD VENIPUNCTURE: CPT | Performed by: FAMILY MEDICINE

## 2025-02-06 PROCEDURE — 83735 ASSAY OF MAGNESIUM: CPT | Performed by: FAMILY MEDICINE

## 2025-02-06 PROCEDURE — 82550 ASSAY OF CK (CPK): CPT | Performed by: FAMILY MEDICINE

## 2025-02-06 PROCEDURE — 85651 RBC SED RATE NONAUTOMATED: CPT | Performed by: FAMILY MEDICINE

## 2025-02-17 DIAGNOSIS — K21.9 GASTROESOPHAGEAL REFLUX DISEASE WITHOUT ESOPHAGITIS: ICD-10-CM

## 2025-02-17 NOTE — TELEPHONE ENCOUNTER
No care due was identified.  [facility] Embedded Care Due Messages. Reference number: [id number].   2/17/2025 5:41:11 PM CST

## 2025-02-18 RX ORDER — PANTOPRAZOLE SODIUM 40 MG/1
40 TABLET, DELAYED RELEASE ORAL 2 TIMES DAILY
Qty: 180 TABLET | Refills: 1 | Status: SHIPPED | OUTPATIENT
Start: 2025-02-18

## 2025-02-18 RX ORDER — METFORMIN HYDROCHLORIDE 500 MG/1
500 TABLET ORAL 2 TIMES DAILY WITH MEALS
Qty: 90 TABLET | Refills: 1 | Status: SHIPPED | OUTPATIENT
Start: 2025-02-18 | End: 2026-02-18

## 2025-02-18 NOTE — TELEPHONE ENCOUNTER
Refill Routing Note   Medication(s) are not appropriate for processing by [facility] for the following reason(s):        Outside of protocol  New or recently adjusted medication    [facility] action(s):  Route  Defer               Appointments  past 12m or future 3m with PCP    Date Provider   Last Visit   1/30/2025 Edson Dang MD   Next Visit   3/20/2025 Edson Dang MD   ED visits in past 90 days: 0        Note composed:8:58 AM 02/18/2025

## 2025-02-26 RX ORDER — BACLOFEN 10 MG/1
10 TABLET ORAL
Qty: 270 TABLET | Refills: 0 | Status: SHIPPED | OUTPATIENT
Start: 2025-02-26

## 2025-03-21 ENCOUNTER — TELEPHONE (OUTPATIENT)
Dept: PHARMACY | Facility: CLINIC | Age: 60
End: 2025-03-21
Payer: COMMERCIAL

## 2025-03-21 NOTE — TELEPHONE ENCOUNTER
[facility]/Population Health Chart Review & Patient Outreach Details For Medication Adherence Project    Reason for Outreach Encounter: 3rd Party payor non-compliance report ([employer], [employer], [facility], etc)  2.  Patient Outreach Method: Reviewed Patient Chart  3.   Medication in question: pravastatin   LAST FILLED: 1/31/25 for 90 day supply  Hyperlipidemia Medications              pravastatin (PRAVACHOL) 20 MG tablet Take 1 tablet (20 mg total) by mouth once daily.               4.  Reviewed and or Updates Made To: Patient Chart  5. Outreach Outcomes and/or actions taken: Patient filled medication and is on track to be adherent

## 2025-05-13 DIAGNOSIS — K21.9 GASTROESOPHAGEAL REFLUX DISEASE WITHOUT ESOPHAGITIS: ICD-10-CM

## 2025-05-13 RX ORDER — PANTOPRAZOLE SODIUM 40 MG/1
40 TABLET, DELAYED RELEASE ORAL 2 TIMES DAILY
Qty: 180 TABLET | Refills: 1 | OUTPATIENT
Start: 2025-05-13

## 2025-05-13 NOTE — TELEPHONE ENCOUNTER
Care Due:                  Date            Visit Type   Department     Provider  --------------------------------------------------------------------------------                                EP -                              PRIMARY      [facility] INTERNAL  Last Visit: 01-      CARE ([facility])   BARRY Dang                              EP -                              PRIMARY      [facility] INTERNAL  Next Visit: 05-      CARE ([facility])   BARRY Dang                                                            Last  Test          Frequency    Reason                     Performed    Due Date  --------------------------------------------------------------------------------    HBA1C.......  6 months...  metFORMIN................  12- 06-    Lipid Panel.  12 months..  pravastatin..............  08- 07-    [facility] Embedded Care Due Messages. Reference number: [id number].   5/13/2025 2:27:47 PM CDT

## 2025-05-27 ENCOUNTER — OFFICE VISIT (OUTPATIENT)
Dept: INTERNAL MEDICINE | Facility: CLINIC | Age: 60
End: 2025-05-27
Payer: COMMERCIAL

## 2025-05-27 VITALS
OXYGEN SATURATION: 99 % | TEMPERATURE: 97 F | SYSTOLIC BLOOD PRESSURE: 132 MMHG | DIASTOLIC BLOOD PRESSURE: 84 MMHG | RESPIRATION RATE: 18 BRPM | HEART RATE: 110 BPM | BODY MASS INDEX: 35.69 KG/M2 | WEIGHT: 278 LBS

## 2025-05-27 DIAGNOSIS — K21.9 GASTROESOPHAGEAL REFLUX DISEASE WITHOUT ESOPHAGITIS: Primary | ICD-10-CM

## 2025-05-27 DIAGNOSIS — Z12.11 SCREENING FOR COLON CANCER: ICD-10-CM

## 2025-05-27 DIAGNOSIS — E78.5 HYPERLIPIDEMIA, UNSPECIFIED HYPERLIPIDEMIA TYPE: ICD-10-CM

## 2025-05-27 DIAGNOSIS — F10.10 EXCESSIVE DRINKING ALCOHOL: ICD-10-CM

## 2025-05-27 DIAGNOSIS — G89.29 CHRONIC LOW BACK PAIN, UNSPECIFIED BACK PAIN LATERALITY, UNSPECIFIED WHETHER SCIATICA PRESENT: ICD-10-CM

## 2025-05-27 DIAGNOSIS — R79.89 ABNORMAL LIVER FUNCTION TEST: ICD-10-CM

## 2025-05-27 DIAGNOSIS — R73.03 PREDIABETES: ICD-10-CM

## 2025-05-27 DIAGNOSIS — I10 ESSENTIAL HYPERTENSION: ICD-10-CM

## 2025-05-27 DIAGNOSIS — M54.50 CHRONIC LOW BACK PAIN, UNSPECIFIED BACK PAIN LATERALITY, UNSPECIFIED WHETHER SCIATICA PRESENT: ICD-10-CM

## 2025-05-27 PROCEDURE — 99214 OFFICE O/P EST MOD 30 MIN: CPT | Mod: S$GLB,,, | Performed by: FAMILY MEDICINE

## 2025-05-27 PROCEDURE — 4010F ACE/ARB THERAPY RXD/TAKEN: CPT | Mod: CPTII,S$GLB,, | Performed by: FAMILY MEDICINE

## 2025-05-27 PROCEDURE — 99999 PR PBB SHADOW E&M-EST. PATIENT-LVL III: CPT | Mod: PBBFAC,,, | Performed by: FAMILY MEDICINE

## 2025-05-27 PROCEDURE — G2211 COMPLEX E/M VISIT ADD ON: HCPCS | Mod: S$GLB,,, | Performed by: FAMILY MEDICINE

## 2025-05-27 PROCEDURE — 1159F MED LIST DOCD IN RCRD: CPT | Mod: CPTII,S$GLB,, | Performed by: FAMILY MEDICINE

## 2025-05-27 PROCEDURE — 3008F BODY MASS INDEX DOCD: CPT | Mod: CPTII,S$GLB,, | Performed by: FAMILY MEDICINE

## 2025-05-27 PROCEDURE — 3079F DIAST BP 80-89 MM HG: CPT | Mod: CPTII,S$GLB,, | Performed by: FAMILY MEDICINE

## 2025-05-27 PROCEDURE — 3075F SYST BP GE 130 - 139MM HG: CPT | Mod: CPTII,S$GLB,, | Performed by: FAMILY MEDICINE

## 2025-05-27 RX ORDER — CETIRIZINE HYDROCHLORIDE 10 MG/1
10 TABLET ORAL DAILY
Qty: 90 TABLET | Refills: 1 | Status: SHIPPED | OUTPATIENT
Start: 2025-05-27 | End: 2026-05-27

## 2025-05-27 RX ORDER — FLUTICASONE PROPIONATE 50 MCG
1 SPRAY, SUSPENSION (ML) NASAL DAILY
Qty: 16 G | Refills: 1 | Status: SHIPPED | OUTPATIENT
Start: 2025-05-27

## 2025-05-28 ENCOUNTER — PATIENT MESSAGE (OUTPATIENT)
Dept: INTERNAL MEDICINE | Facility: CLINIC | Age: 60
End: 2025-05-28
Payer: COMMERCIAL
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
## 2025-07-01 ENCOUNTER — TELEPHONE (OUTPATIENT)
Dept: PHARMACY | Facility: CLINIC | Age: 60
End: 2025-07-01
Payer: COMMERCIAL

## 2025-07-07 ENCOUNTER — TELEPHONE (OUTPATIENT)
Dept: PHARMACY | Facility: CLINIC | Age: 60
End: 2025-07-07
Payer: COMMERCIAL

## 2025-07-07 NOTE — TELEPHONE ENCOUNTER
[facility]/Population Health Chart Review & Patient Outreach Details For Medication Adherence Project    Reason for Outreach Encounter: 3rd Party payor non-compliance report ([employer], [employer], [facility], etc)  2.  Patient Outreach Method: Reviewed patient chart   3.   Medication in question:    Hyperlipidemia Medications              pravastatin (PRAVACHOL) 20 MG tablet Take 1 tablet (20 mg total) by mouth once daily.                  pravastatin   last filled  1/31/25 for 90 day supply    4.  Reviewed and or Updates Made To: Patient Chart  5. Outreach Outcomes and/or actions taken: Patient reminded to  prescription  Additional Notes:

## 2025-08-07 ENCOUNTER — TELEPHONE (OUTPATIENT)
Dept: PHARMACY | Facility: CLINIC | Age: 60
End: 2025-08-07
Payer: COMMERCIAL

## 2025-08-07 NOTE — TELEPHONE ENCOUNTER
[facility]/Population Health Chart Review & Patient Outreach Details For Medication Adherence Project    Reason for Outreach Encounter: 3rd Party payor non-compliance report ([employer], [employer], [facility], etc)  2.  Patient Outreach Method: [employer] message  3.   Medication in question: pravastatin    LAST FILLED: 1/31/25 for 90 day supply  Hyperlipidemia Medications              pravastatin (PRAVACHOL) 20 MG tablet Take 1 tablet (20 mg total) by mouth once daily.               4.  Reviewed and or Updates Made To: Patient Chart  5. Outreach Outcomes and/or actions taken: Sent inquiry to patient: Waiting for response.

## 2025-08-08 RX ORDER — PRAVASTATIN SODIUM 20 MG/1
20 TABLET ORAL DAILY
Qty: 90 TABLET | Refills: 0 | Status: SHIPPED | OUTPATIENT
Start: 2025-08-08 | End: 2026-08-08

## 2025-08-08 NOTE — TELEPHONE ENCOUNTER
Refill Routing Note   Medication(s) are not appropriate for processing by [facility] for the following reason(s):        Required labs outdated    [facility] action(s):  Defer               Appointments  past 12m or future 3m with PCP    Date Provider   Last Visit   5/27/2025 Edson Dang MD   Next Visit   12/2/2025 Edson Dang MD   ED visits in past 90 days: 0        Note composed:9:57 PM 08/07/2025

## 2025-08-19 ENCOUNTER — TELEPHONE (OUTPATIENT)
Dept: PHARMACY | Facility: CLINIC | Age: 60
End: 2025-08-19
Payer: COMMERCIAL

## (undated) DEVICE — STAPLER INT LINEAR ARTC 3.5-45

## (undated) DEVICE — GAUZE SPONGE 4X4 12PLY

## (undated) DEVICE — UNDERGLOVES BIOGEL PI SIZE 8.5

## (undated) DEVICE — CORD LAP 10 DISP

## (undated) DEVICE — ELECTRODE REM PLYHSV RETURN 9

## (undated) DEVICE — DRAPE SURG W/TWL 17 5/8X23

## (undated) DEVICE — GLOVE BIOGEL SZ 8 1/2

## (undated) DEVICE — MANIFOLD 4 PORT

## (undated) DEVICE — DRAPE MOBILE C-ARM

## (undated) DEVICE — APPLICATOR CHLORAPREP ORN 26ML

## (undated) DEVICE — COVER LIGHT HANDLE 80/CA

## (undated) DEVICE — PAD ABD 8X10 STERILE

## (undated) DEVICE — TOURNIQUET SB QC DP 18X4IN

## (undated) DEVICE — SUT MCRYL PLUS 4-0 PS2 27IN

## (undated) DEVICE — SUT VICRYL 2-0 8-18 CP-2

## (undated) DEVICE — SEE MEDLINE ITEM 157027

## (undated) DEVICE — TROCAR KII BLLN 12MM 10CM

## (undated) DEVICE — POSITIONER HEAD DONUT 9IN FOAM

## (undated) DEVICE — GOWN SURG 2XL DISP TIE BACK

## (undated) DEVICE — SOL NS 1000CC

## (undated) DEVICE — GLOVE SURGICAL LATEX SZ 7

## (undated) DEVICE — DRESSING XEROFORM FOIL PK 1X8

## (undated) DEVICE — IMMOBILIZER SHOULDER LARGE

## (undated) DEVICE — BLADE SURG CARBON STEEL SZ11

## (undated) DEVICE — SET CYSTO IRRIGATION UNIV SPIK

## (undated) DEVICE — SEE MEDLINE ITEM 157117

## (undated) DEVICE — Device

## (undated) DEVICE — REAMER LOW PROFILE 8MM

## (undated) DEVICE — NDL PNEUMO INSUFFLATI 120MM

## (undated) DEVICE — SEALER LIGASURE LAP 37CM 5MM

## (undated) DEVICE — IRRIGATOR ENDOSCOPY DISP.

## (undated) DEVICE — UNDERGLOVES BIOGEL PI SZ 7 LF

## (undated) DEVICE — TUBING SUCTION STRAIGHT .25X20

## (undated) DEVICE — NDL SAFETY 25G X 1.5 ECLIPSE

## (undated) DEVICE — SYR 30CC LUER LOCK

## (undated) DEVICE — SUT PROLENE 2-0 SH 36IN BLU

## (undated) DEVICE — COVER OVERHEAD SURG LT BLUE

## (undated) DEVICE — SYR 3CC LUER LOC

## (undated) DEVICE — TROCAR ENDOPATH XCEL 12X100MM

## (undated) DEVICE — SUT SILK 0 SH 30IN BLK BR

## (undated) DEVICE — CANNULA ENDOPATH XCEL 5X100MM

## (undated) DEVICE — TAPE MEDIPORE 4IN X 2YDS

## (undated) DEVICE — DRAPE PLASTIC U 60X72

## (undated) DEVICE — GLOVE BIOGEL ECLIPSE SZ 7.5

## (undated) DEVICE — SUT VICRYL PLUS 3-0 SH 18IN

## (undated) DEVICE — SCRUB HIBICLENS 4% CHG 4OZ

## (undated) DEVICE — SUT 4.0 ETHILON

## (undated) DEVICE — ALCOHOL 70% ISOP RUBBING 4OZ

## (undated) DEVICE — STOCKINET TUBULAR 1 PLY 6X60IN

## (undated) DEVICE — DRAPE ABDOMINAL TIBURON 14X11

## (undated) DEVICE — SUT SILK 0 SUTUPAK SA86H

## (undated) DEVICE — DRESSING TELFA STRL 4X3 LF

## (undated) DEVICE — TROCAR ENDOPATH XCEL 5X100MM

## (undated) DEVICE — BANDAGE MATRIX HK LOOP 2IN 5YD

## (undated) DEVICE — COVER CAMERA OPERATING ROOM

## (undated) DEVICE — KIT ANTIFOG

## (undated) DEVICE — NDL SPINAL 18GX3.5 SPINOCAN

## (undated) DEVICE — SEAL SCOPE WARMER 20/BX

## (undated) DEVICE — TIP GRASPER FENESTRATED DISP

## (undated) DEVICE — TAPE SILK 3IN

## (undated) DEVICE — TOWEL OR DISP STRL BLUE 4/PK

## (undated) DEVICE — SEE MEDLINE ITEM 152622

## (undated) DEVICE — CART STAPLE FLEX ETX 3.5MM BLU

## (undated) DEVICE — PACK DRAPE PERI/GYN TIBURON

## (undated) DEVICE — STAPLER CIRCULAR 25MM

## (undated) DEVICE — SUPPORT ULNA NERVE PROTECTOR

## (undated) DEVICE — SYR 10CC LUER LOCK

## (undated) DEVICE — STAPLER SKIN PROXIMATE WIDE

## (undated) DEVICE — DEV-O-LOOPS MAXI RED

## (undated) DEVICE — PAD CAST SPECIALIST STRL 4

## (undated) DEVICE — BANDAGE MATRIX HK LOOP 3IN 5YD